# Patient Record
Sex: FEMALE | Race: WHITE | NOT HISPANIC OR LATINO | Employment: OTHER | URBAN - METROPOLITAN AREA
[De-identification: names, ages, dates, MRNs, and addresses within clinical notes are randomized per-mention and may not be internally consistent; named-entity substitution may affect disease eponyms.]

---

## 2023-05-03 ENCOUNTER — NURSING HOME VISIT (OUTPATIENT)
Dept: POST ACUTE CARE | Facility: EXTERNAL LOCATION | Age: 88
End: 2023-05-03
Payer: MEDICARE

## 2023-05-03 DIAGNOSIS — I50.9 CONGESTIVE HEART FAILURE, UNSPECIFIED HF CHRONICITY, UNSPECIFIED HEART FAILURE TYPE (MULTI): Primary | ICD-10-CM

## 2023-05-03 DIAGNOSIS — R53.1 WEAKNESS: ICD-10-CM

## 2023-05-03 PROCEDURE — 99342 HOME/RES VST NEW LOW MDM 30: CPT | Performed by: REGISTERED NURSE

## 2023-05-03 NOTE — Clinical Note
Patient: Ermelinda Benoit  : 3/14/1933    Encounter Date: 2023    HISTORY AND PHYSICAL    Subjective  Chief complaint: Ermelinda Benoit is a 89 y.o. female who is a assisted living/ home patient patient being seen and evaluated for multiple medical problems.  Patient presents for ***    HPI:  HPI    Past Medical History:   Diagnosis Date    Personal history of malignant neoplasm of breast 2018    History of malignant neoplasm of breast    Personal history of other medical treatment     History of cardiac monitoring    Personal history of other medical treatment     History of echocardiogram    Unspecified nondisplaced fracture of fifth cervical vertebra, initial encounter for closed fracture (CMS/[facility]) 11/15/2016    Closed nondisplaced fracture of fifth cervical vertebra, unspecified fracture morphology, initial encounter       Past Surgical History:   Procedure Laterality Date    BREAST LUMPECTOMY  2018    Breast Surgery Lumpectomy    CATARACT EXTRACTION  2018    Cataract Surgery    COLONOSCOPY  2018    Colonoscopy (Fiberoptic)    OTHER SURGICAL HISTORY  2018    Therapeutic Cystoscopy    OTHER SURGICAL HISTORY  2021    Transcatheter aortic valve replacement    TOTAL HIP ARTHROPLASTY  2018    Hip Replacement       No family history on file.    Social History     Socioeconomic History    Marital status:      Spouse name: Not on file    Number of children: Not on file    Years of education: Not on file    Highest education level: Not on file   Occupational History    Not on file   Tobacco Use    Smoking status: Not on file    Smokeless tobacco: Not on file   Vaping Use    Vaping status: Not on file   Substance and Sexual Activity    Alcohol use: Not on file    Drug use: Not on file    Sexual activity: Not on file   Other Topics Concern    Not on file   Social History Narrative    Not on file     Social Determinants of Health     Financial Resource Strain: Not  on file   Food Insecurity: Not on file   Transportation Needs: Not on file   Physical Activity: Not on file   Stress: Not on file   Social Connections: Not on file   Intimate Partner Violence: Not on file   Housing Stability: Not on file       Vital signs: ***    Objective  Physical Exam    Assessment/Plan  Problem List Items Addressed This Visit    None    Medications, treatments, and labs reviewed  Continue medications and treatments as listed in PCC    Scribe Attestation  Tayler MURILLO Scribe   attest that this documentation has been prepared under the direction and in the presence of BLAS Wang    Provider Attestation - Scribe documentation  All medical record entries made by the Scribe were at my direction and personally dictated by me. I have reviewed the chart and agree that the record accurately reflects my personal performance of the history, physical exam, discussion and plan.  BLAS Wang      Electronically Signed By: BLAS Wang   5/25/23  1:12 PM

## 2023-05-05 NOTE — PROGRESS NOTES
HISTORY AND PHYSICAL    Subjective   Chief complaint: Ermelinda Benoit is a 89 y.o. female who is a assisted living/ home patient patient being seen and evaluated for multiple medical problems.  Patient presents for new patient due to family request to change PCP    HPI:  HPI patient seen at bedside with daughter present denies shortness of breath fever chills    Past Medical History:   Diagnosis Date    Personal history of malignant neoplasm of breast 01/09/2018    History of malignant neoplasm of breast    Personal history of other medical treatment     History of cardiac monitoring    Personal history of other medical treatment     History of echocardiogram    Unspecified nondisplaced fracture of fifth cervical vertebra, initial encounter for closed fracture (CMS/[facility]) 11/15/2016    Closed nondisplaced fracture of fifth cervical vertebra, unspecified fracture morphology, initial encounter       Past Surgical History:   Procedure Laterality Date    BREAST LUMPECTOMY  03/09/2018    Breast Surgery Lumpectomy    CATARACT EXTRACTION  01/23/2018    Cataract Surgery    COLONOSCOPY  03/09/2018    Colonoscopy (Fiberoptic)    OTHER SURGICAL HISTORY  01/09/2018    Therapeutic Cystoscopy    OTHER SURGICAL HISTORY  03/30/2021    Transcatheter aortic valve replacement    TOTAL HIP ARTHROPLASTY  03/09/2018    Hip Replacement       No family history on file.    Social History     Socioeconomic History    Marital status:      Spouse name: Not on file    Number of children: Not on file    Years of education: Not on file    Highest education level: Not on file   Occupational History    Not on file   Tobacco Use    Smoking status: Not on file    Smokeless tobacco: Not on file   Vaping Use    Vaping status: Not on file   Substance and Sexual Activity    Alcohol use: Not on file    Drug use: Not on file    Sexual activity: Not on file   Other Topics Concern    Not on file   Social History Narrative    Not on file     Social  Determinants of Health     Financial Resource Strain: Not on file   Food Insecurity: Not on file   Transportation Needs: Not on file   Physical Activity: Not on file   Stress: Not on file   Social Connections: Not on file   Intimate Partner Violence: Not on file   Housing Stability: Not on file       Vital signs:     Objective   Physical Exam  Vitals reviewed.   Constitutional:       General: She is not in acute distress.  HENT:      Head: Normocephalic.      Mouth/Throat:      Mouth: Mucous membranes are moist.   Cardiovascular:      Rate and Rhythm: Normal rate.   Pulmonary:      Effort: Pulmonary effort is normal. No respiratory distress.   Abdominal:      General: Bowel sounds are normal.   Skin:     General: Skin is warm.   Neurological:      Mental Status: She is alert. Mental status is at baseline.         Assessment/Plan   Problem List Items Addressed This Visit          Circulatory    CHF (congestive heart failure) (CMS/[facility]) - Primary     Monitor weight            Other    Weakness     PT OT eval and treat          Medications, treatments, and labs reviewed  Continue medications and treatments as listed in PCC    Scribe Attestation  Tayler MURILLO Scribe   attest that this documentation has been prepared under the direction and in the presence of BLAS Wang    Provider Attestation - Scribe documentation  All medical record entries made by the Scribe were at my direction and personally dictated by me. I have reviewed the chart and agree that the record accurately reflects my personal performance of the history, physical exam, discussion and plan.  BLAS Wang

## 2023-05-31 ENCOUNTER — NURSING HOME VISIT (OUTPATIENT)
Dept: POST ACUTE CARE | Facility: EXTERNAL LOCATION | Age: 88
End: 2023-05-31
Payer: MEDICARE

## 2023-05-31 DIAGNOSIS — J44.9 CHRONIC OBSTRUCTIVE PULMONARY DISEASE, UNSPECIFIED COPD TYPE (MULTI): ICD-10-CM

## 2023-05-31 DIAGNOSIS — I50.9 CONGESTIVE HEART FAILURE, UNSPECIFIED HF CHRONICITY, UNSPECIFIED HEART FAILURE TYPE (MULTI): ICD-10-CM

## 2023-05-31 DIAGNOSIS — I48.20 CHRONIC ATRIAL FIBRILLATION (MULTI): Primary | ICD-10-CM

## 2023-05-31 DIAGNOSIS — I10 HYPERTENSION, ESSENTIAL: ICD-10-CM

## 2023-05-31 PROCEDURE — 99349 HOME/RES VST EST MOD MDM 40: CPT | Performed by: INTERNAL MEDICINE

## 2023-05-31 NOTE — LETTER
Patient: Ermelinda Benoit  : 3/14/1933    Encounter Date: 2023    PROGRESS NOTE    Subjective  Chief complaint: Ermelinda Benoit is a 89 y.o. female who is a assisted living/ home patient patient being seen and evaluated for monthly general medical care and follow-up.    HPI:  Patient seen for general medical care.  A-fib is rate controlled.. No palpitations.  COPD is stable. No wheezing.  HTN is controlled. BP at goal for age.      Objective  Vital signs:  138/56, 97.9, 66, 17, 95%  Physical Exam  Constitutional:       General: She is not in acute distress.  Eyes:      Extraocular Movements: Extraocular movements intact.   Cardiovascular:      Rate and Rhythm: Normal rate and regular rhythm.   Pulmonary:      Effort: Pulmonary effort is normal.      Breath sounds: Normal breath sounds.   Abdominal:      General: Bowel sounds are normal.      Palpations: Abdomen is soft.   Musculoskeletal:      Cervical back: Neck supple.      Right lower leg: No edema.      Left lower leg: No edema.   Neurological:      Mental Status: She is alert.   Psychiatric:         Mood and Affect: Mood normal.         Behavior: Behavior is cooperative.         Assessment/Plan  Problem List Items Addressed This Visit       CHF (congestive heart failure) (CMS/HCC)    COPD (chronic obstructive pulmonary disease) (CMS/HCC)    Hypertension, essential    Atrial fibrillation (CMS/HCC) - Primary     Medications, treatments, and labs reviewed  Continue medications and treatments as listed in EMR    Scribe Attestation  Tayler MURILLO Scribe   attest that this documentation has been prepared under the direction and in the presence of Mckay Arita DO    Provider Attestation - Scribe documentation  All medical record entries made by the Scribe were at my direction and personally dictated by me. I have reviewed the chart and agree that the record accurately reflects my personal performance of the history, physical exam, discussion and  plan.   Mckay Arita DO        Electronically Signed By: Mckay Arita DO   8/1/23 12:00 PM

## 2023-06-05 PROBLEM — I50.9 CHF (CONGESTIVE HEART FAILURE) (MULTI): Status: ACTIVE | Noted: 2023-06-05

## 2023-06-05 PROBLEM — R53.1 WEAKNESS: Status: ACTIVE | Noted: 2023-06-05

## 2023-06-05 PROBLEM — R60.0 LEG EDEMA: Status: ACTIVE | Noted: 2023-06-05

## 2023-06-27 NOTE — PROGRESS NOTES
PROGRESS NOTE    Subjective   Chief complaint: Ermelinda Benoit is a 89 y.o. female who is a assisted living/ home patient patient being seen and evaluated for monthly general medical care and follow-up.    HPI:  Patient seen for general medical care.  A-fib is rate controlled.. No palpitations.  COPD is stable. No wheezing.  HTN is controlled. BP at goal for age.      Objective   Vital signs:  138/56, 97.9, 66, 17, 95%  Physical Exam  Constitutional:       General: She is not in acute distress.  Eyes:      Extraocular Movements: Extraocular movements intact.   Cardiovascular:      Rate and Rhythm: Normal rate and regular rhythm.   Pulmonary:      Effort: Pulmonary effort is normal.      Breath sounds: Normal breath sounds.   Abdominal:      General: Bowel sounds are normal.      Palpations: Abdomen is soft.   Musculoskeletal:      Cervical back: Neck supple.      Right lower leg: No edema.      Left lower leg: No edema.   Neurological:      Mental Status: She is alert.   Psychiatric:         Mood and Affect: Mood normal.         Behavior: Behavior is cooperative.         Assessment/Plan   Problem List Items Addressed This Visit       CHF (congestive heart failure) (CMS/[facility])    COPD (chronic obstructive pulmonary disease) (CMS/[facility])    Hypertension, essential    Atrial fibrillation (CMS/[facility]) - Primary     Medications, treatments, and labs reviewed  Continue medications and treatments as listed in EMR    Scribe Attestation  ITayler Scribe   attest that this documentation has been prepared under the direction and in the presence of Mckay Arita DO    Provider Attestation - Scribe documentation  All medical record entries made by the Scribe were at my direction and personally dictated by me. I have reviewed the chart and agree that the record accurately reflects my personal performance of the history, physical exam, discussion and plan.   Mckay Arita DO

## 2023-07-14 ENCOUNTER — NURSING HOME VISIT (OUTPATIENT)
Dept: POST ACUTE CARE | Facility: EXTERNAL LOCATION | Age: 88
End: 2023-07-14
Payer: MEDICARE

## 2023-07-14 DIAGNOSIS — R53.1 WEAKNESS: ICD-10-CM

## 2023-07-14 DIAGNOSIS — J44.9 CHRONIC OBSTRUCTIVE PULMONARY DISEASE, UNSPECIFIED COPD TYPE (MULTI): ICD-10-CM

## 2023-07-14 DIAGNOSIS — S42.351D CLOSED DISPLACED COMMINUTED FRACTURE OF SHAFT OF RIGHT HUMERUS WITH ROUTINE HEALING: ICD-10-CM

## 2023-07-14 DIAGNOSIS — I48.20 CHRONIC ATRIAL FIBRILLATION (MULTI): Primary | ICD-10-CM

## 2023-07-14 DIAGNOSIS — I50.9 CONGESTIVE HEART FAILURE, UNSPECIFIED HF CHRONICITY, UNSPECIFIED HEART FAILURE TYPE (MULTI): ICD-10-CM

## 2023-07-14 DIAGNOSIS — I10 HYPERTENSION, ESSENTIAL: ICD-10-CM

## 2023-07-14 DIAGNOSIS — R60.0 LEG EDEMA: ICD-10-CM

## 2023-07-14 PROCEDURE — 99305 1ST NF CARE MODERATE MDM 35: CPT | Performed by: INTERNAL MEDICINE

## 2023-07-14 NOTE — LETTER
Patient: Ermelinda Benoit  : 3/14/1933    Encounter Date: 2023    HISTORY & PHYSICAL    Subjective  Chief complaint: Ermelinda Benoit is a 89 y.o. female who is a acute skilled care patient being seen and evaluated for multiple medical problems.  Patient presents for weakness.    HPI:  Patient was admitted to skilled nursing facility. Patient was admitted due to displaced comminuted fracture of shaft of humerus right arm. Patient also has a past medical history of COPD, cerebral infarction and weakness.         Past Medical History:   Diagnosis Date   • Personal history of malignant neoplasm of breast 2018    History of malignant neoplasm of breast   • Personal history of other medical treatment     History of cardiac monitoring   • Personal history of other medical treatment     History of echocardiogram   • Unspecified nondisplaced fracture of fifth cervical vertebra, initial encounter for closed fracture (CMS/[facility]) 11/15/2016    Closed nondisplaced fracture of fifth cervical vertebra, unspecified fracture morphology, initial encounter       Past Surgical History:   Procedure Laterality Date   • BREAST LUMPECTOMY  2018    Breast Surgery Lumpectomy   • CATARACT EXTRACTION  2018    Cataract Surgery   • COLONOSCOPY  2018    Colonoscopy (Fiberoptic)   • OTHER SURGICAL HISTORY  2018    Therapeutic Cystoscopy   • OTHER SURGICAL HISTORY  2021    Transcatheter aortic valve replacement   • TOTAL HIP ARTHROPLASTY  2018    Hip Replacement       No family history on file.    Social History     Socioeconomic History   • Marital status:      Spouse name: Not on file   • Number of children: Not on file   • Years of education: Not on file   • Highest education level: Not on file   Occupational History   • Not on file   Tobacco Use   • Smoking status: Not on file   • Smokeless tobacco: Not on file   Substance and Sexual Activity   • Alcohol use: Not on file   • Drug use:  Not on file   • Sexual activity: Not on file   Other Topics Concern   • Not on file   Social History Narrative   • Not on file     Social Determinants of Health     Financial Resource Strain: Not on file   Food Insecurity: Not on file   Transportation Needs: Not on file   Physical Activity: Not on file   Stress: Not on file   Social Connections: Not on file   Intimate Partner Violence: Not on file   Housing Stability: Not on file       Vital signs: 103/42, 98.5, 80, 19, 97%    Objective  Physical Exam  HENT:      Head: Normocephalic and atraumatic.      Nose: Nose normal.      Mouth/Throat:      Mouth: Mucous membranes are moist.      Pharynx: Oropharynx is clear.   Eyes:      Extraocular Movements: Extraocular movements intact.      Pupils: Pupils are equal, round, and reactive to light.   Cardiovascular:      Rate and Rhythm: Normal rate and regular rhythm.   Pulmonary:      Effort: No respiratory distress.      Breath sounds: Normal breath sounds. No wheezing, rhonchi or rales.   Abdominal:      General: Bowel sounds are normal. There is no distension.      Palpations: Abdomen is soft.      Tenderness: There is no abdominal tenderness. There is no guarding.   Musculoskeletal:      Right lower leg: No edema.      Left lower leg: No edema.   Skin:     General: Skin is warm and dry.   Neurological:      Mental Status: She is alert. Mental status is at baseline.         Assessment/Plan  Problem List Items Addressed This Visit       CHF (congestive heart failure) (CMS/[facility])    Leg edema    Weakness    Closed displaced comminuted fracture of shaft of right humerus with routine healing    COPD (chronic obstructive pulmonary disease) (CMS/HCC)    Hypertension, essential    Atrial fibrillation (CMS/[facility]) - Primary     Medications, treatments, and labs reviewed  Continue medications and treatments as listed in [facility]    Scribe Attestation  I, Nancy Us   attest that this documentation has been prepared under the  direction and in the presence of Mckay Arita DO.    Provider Attestation - Scribe documentation  All medical record entries made by the Scribe were at my direction and personally dictated by me. I have reviewed the chart and agree that the record accurately reflects my personal performance of the history, physical exam, discussion and plan.    Mckay Arita DO          Electronically Signed By: Mckay Arita DO   8/14/23  4:06 PM

## 2023-07-18 ENCOUNTER — NURSING HOME VISIT (OUTPATIENT)
Dept: POST ACUTE CARE | Facility: EXTERNAL LOCATION | Age: 88
End: 2023-07-18
Payer: MEDICARE

## 2023-07-18 DIAGNOSIS — I50.9 CONGESTIVE HEART FAILURE, UNSPECIFIED HF CHRONICITY, UNSPECIFIED HEART FAILURE TYPE (MULTI): ICD-10-CM

## 2023-07-18 DIAGNOSIS — S42.351D CLOSED DISPLACED COMMINUTED FRACTURE OF SHAFT OF RIGHT HUMERUS WITH ROUTINE HEALING: ICD-10-CM

## 2023-07-18 DIAGNOSIS — R53.1 WEAKNESS: Primary | ICD-10-CM

## 2023-07-18 PROCEDURE — 99308 SBSQ NF CARE LOW MDM 20: CPT | Performed by: REGISTERED NURSE

## 2023-07-18 NOTE — LETTER
Patient: Ermelinda Benoit  : 3/14/1933    Encounter Date: 2023    PROGRESS NOTE    Subjective  Chief complaint: Ermelinda Benoit is a 89 y.o. female who is a acute skilled care patient being seen and evaluated for weakness.    HPI:  23  Patient was admitted to skilled nursing facility. Patient was admitted due to displaced comminuted fracture of shaft of humerus right arm. Patient also has a past medical history of COPD, cerebral infarction and weakness.     23   Patient recently admitted to SNF for therapy d/t weakness after recent hospitalization for right humerus fracture.   Patient requires assist with ADLs and transfers.  Therapy is working with patient to increase strength and improve functional mobility.  Patient is ambulating 20 feet with LBQC and minimal/contact-guard assist.  Requires minimal assist for transfers and moderate/max assist for ADLs.  Patient is stable with no new concerns.  Pain from recent fracture is controlled with current medications.  No new concerns reported by staff.      Objective  Vital signs: 152/72  Physical Exam  Constitutional:       General: She is not in acute distress.  Eyes:      Extraocular Movements: Extraocular movements intact.   Pulmonary:      Effort: Pulmonary effort is normal.   Musculoskeletal:      Cervical back: Neck supple.   Neurological:      Mental Status: She is alert.   Psychiatric:         Mood and Affect: Mood normal.         Behavior: Behavior is cooperative.         Assessment/Plan  Problem List Items Addressed This Visit       CHF (congestive heart failure) (CMS/[facility])     Monitor weight low sodium diet          Weakness - Primary     PT OT eval and treat         Closed displaced comminuted fracture of shaft of right humerus with routine healing      RUE sling   no swelling in hand   Therapy   pain management          Medications, treatments, and labs reviewed  Continue medications and treatments as listed in EMR    Scribe  Attestation  I, Nancy Adame   attest that this documentation has been prepared under the direction and in the presence of BLAS Wang    Provider Attestation - Scribe documentation  All medical record entries made by the Scribe were at my direction and personally dictated by me. I have reviewed the chart and agree that the record accurately reflects my personal performance of the history, physical exam, discussion and plan.   BLAS Wang        Electronically Signed By: BLAS Wang   8/31/23  3:46 PM

## 2023-07-19 ENCOUNTER — NURSING HOME VISIT (OUTPATIENT)
Dept: POST ACUTE CARE | Facility: EXTERNAL LOCATION | Age: 88
End: 2023-07-19
Payer: MEDICARE

## 2023-07-19 DIAGNOSIS — I50.9 CONGESTIVE HEART FAILURE, UNSPECIFIED HF CHRONICITY, UNSPECIFIED HEART FAILURE TYPE (MULTI): ICD-10-CM

## 2023-07-19 DIAGNOSIS — R53.1 WEAKNESS: Primary | ICD-10-CM

## 2023-07-19 DIAGNOSIS — S42.351D CLOSED DISPLACED COMMINUTED FRACTURE OF SHAFT OF RIGHT HUMERUS WITH ROUTINE HEALING: ICD-10-CM

## 2023-07-19 DIAGNOSIS — J44.9 CHRONIC OBSTRUCTIVE PULMONARY DISEASE, UNSPECIFIED COPD TYPE (MULTI): ICD-10-CM

## 2023-07-19 PROCEDURE — 99308 SBSQ NF CARE LOW MDM 20: CPT | Performed by: REGISTERED NURSE

## 2023-07-19 NOTE — LETTER
Patient: Ermelinda Benoit  : 3/14/1933    Encounter Date: 2023    PROGRESS NOTE    Subjective  Chief complaint: Ermelinda Benoit is a 89 y.o. female who is an acute skilled patient being seen and evaluated for weakness    HPI:  23  Patient was admitted to skilled nursing facility. Patient was admitted due to displaced comminuted fracture of shaft of humerus right arm. Patient also has a past medical history of COPD, cerebral infarction and weakness.     23   Patient recently admitted to SNF for therapy d/t weakness after recent hospitalization for right humerus fracture.   Patient requires assist with ADLs and transfers.  Therapy is working with patient to increase strength and improve functional mobility.  Patient is ambulating 20 feet with LBQC and minimal/contact-guard assist.  Requires minimal assist for transfers and moderate/max assist for ADLs.  Patient is stable with no new concerns.  Pain from recent fracture is controlled with current medications.  No new concerns reported by staff.    23 Patient working with therapy to reach goals. Patient completing multiple therapeutic exercises to increase strength, mobility and flexibility.   Patient actively participates in skilled interventions. No new nursing concerns, denies n,v,f,c,pain.         Objective  Vital signs: 147/70,71,95% RA    Physical Exam  Constitutional:       General: She is not in acute distress.  Eyes:      Extraocular Movements: Extraocular movements intact.   Pulmonary:      Effort: Pulmonary effort is normal.   Musculoskeletal:      Cervical back: Neck supple.   Neurological:      Mental Status: She is alert.   Psychiatric:         Mood and Affect: Mood normal.         Behavior: Behavior is cooperative.         Assessment/Plan  Problem List Items Addressed This Visit       CHF (congestive heart failure) (CMS/[facility])     Monitor weight low sodium diet          Weakness - Primary     PT OT eval and treat         Closed  displaced comminuted fracture of shaft of right humerus with routine healing      RUE sling   no swelling in hand   Therapy   pain management         COPD (chronic obstructive pulmonary disease) (CMS/[facility])     Stable monitor           Medications, treatments, and labs reviewed  Continue medications and treatments as listed in EMR    Scribe Attestation  Aylin MURILLO Scribe   attest that this documentation has been prepared under the direction and in the presence of BLAS Wang.     Provider Attestation - Scribe documentation  All medical record entries made by the Scribe were at my direction and personally dictated by me. I have reviewed the chart and agree that the record accurately reflects my personal performance of the history, physical exam, discussion and plan.   BLAS Wang      Electronically Signed By: BLAS Wang   9/7/23  9:39 AM   Alonso Jackson(Attending)

## 2023-07-20 ENCOUNTER — NURSING HOME VISIT (OUTPATIENT)
Dept: POST ACUTE CARE | Facility: EXTERNAL LOCATION | Age: 88
End: 2023-07-20
Payer: MEDICARE

## 2023-07-20 ENCOUNTER — NURSING HOME VISIT (OUTPATIENT)
Dept: POST ACUTE CARE | Facility: EXTERNAL LOCATION | Age: 88
End: 2023-07-20

## 2023-07-20 DIAGNOSIS — R53.1 WEAKNESS: ICD-10-CM

## 2023-07-20 DIAGNOSIS — J44.9 CHRONIC OBSTRUCTIVE PULMONARY DISEASE, UNSPECIFIED COPD TYPE (MULTI): ICD-10-CM

## 2023-07-20 DIAGNOSIS — I10 HYPERTENSION, ESSENTIAL: ICD-10-CM

## 2023-07-20 DIAGNOSIS — I50.9 CONGESTIVE HEART FAILURE, UNSPECIFIED HF CHRONICITY, UNSPECIFIED HEART FAILURE TYPE (MULTI): ICD-10-CM

## 2023-07-20 DIAGNOSIS — I48.20 CHRONIC ATRIAL FIBRILLATION (MULTI): Primary | ICD-10-CM

## 2023-07-20 DIAGNOSIS — S42.351D CLOSED DISPLACED COMMINUTED FRACTURE OF SHAFT OF RIGHT HUMERUS WITH ROUTINE HEALING: ICD-10-CM

## 2023-07-20 PROCEDURE — 99308 SBSQ NF CARE LOW MDM 20: CPT | Performed by: INTERNAL MEDICINE

## 2023-07-20 PROCEDURE — 99309 SBSQ NF CARE MODERATE MDM 30: CPT

## 2023-07-20 NOTE — LETTER
Patient: Ermelinda Benoit  : 3/14/1933    Encounter Date: 2023    PROGRESS NOTE    Subjective  Chief complaint: Ermelinda Benoit is a 89 y.o. female who is an acute skilled patient being seen and evaluated for weakness    HPI:  23  patient  admitted to SNF for PT OT S/P fall.  Patient has comminuted fracture right humerus.  PMH COPD CVA right foot ulcer HTN A-fib HF AS HDL diverticulosis cholelithiasis.   Patient at baseline mentation, cooperative, pleasant Patient working with PT on gait training and transfers, and with ST for speech.  Appetite good. Sleeping well.  Denies F/C/N/V/D, cough, SOB, chest pain.  RUE in sling.      Objective  Vital signs: 147/70-98.2-71-18-95%    Physical Exam  Constitutional:       Appearance: Normal appearance.   HENT:      Head: Normocephalic.      Mouth/Throat:      Mouth: Mucous membranes are moist.   Eyes:      Extraocular Movements: Extraocular movements intact.   Cardiovascular:      Rate and Rhythm: Normal rate. Rhythm irregular.      Pulses: Normal pulses.      Heart sounds: Normal heart sounds.   Pulmonary:      Effort: Pulmonary effort is normal.      Breath sounds: Normal breath sounds.   Abdominal:      General: Bowel sounds are normal.      Palpations: Abdomen is soft.   Musculoskeletal:         General: Normal range of motion.      Cervical back: Normal range of motion and neck supple.      Comments:   Generalized weakness   R UE in sling   Skin:     General: Skin is warm and dry.      Capillary Refill: Capillary refill takes less than 2 seconds.   Neurological:      Mental Status: She is alert. Mental status is at baseline.   Psychiatric:         Mood and Affect: Mood normal.         Behavior: Behavior normal.         Assessment/Plan  Problem List Items Addressed This Visit       CHF (congestive heart failure) (CMS/HCC)      Stable   no SOB, cough, Rales, worsening edema   Torsemide   Weights   monitor         Weakness      Therapy   W/C for distance          Closed displaced comminuted fracture of shaft of right humerus with routine healing      right humerus stable   MSP intact   Sling   Ortho follow-up         COPD (chronic obstructive pulmonary disease) (CMS/HCC)      Stable   no SOB, cough, wheeze   monitor         Hypertension, essential      Stable   Monitor   losartan torsemide         Atrial fibrillation (CMS/HCC) - Primary      Stable   no SOB, palpitations, dizziness   heart rate controlled   Eliquis   bleeding precautions   monitor          Medications, treatments, and labs reviewed  Continue medications and treatments as listed in EMR    BLAS Bradley      Electronically Signed By: BLAS Bradley   7/22/23  9:16 AM

## 2023-07-20 NOTE — LETTER
Patient: Ermelinda Benoit  : 3/14/1933    Encounter Date: 2023    PROGRESS NOTE    Subjective  Chief complaint: Ermelinda Benoit is a 89 y.o. female who is an acute skilled patient being seen and evaluated for weakness    HPI:  23  Patient was admitted to skilled nursing facility. Patient was admitted due to displaced comminuted fracture of shaft of humerus right arm. Patient also has a past medical history of COPD, cerebral infarction and weakness.     23   Patient recently admitted to SNF for therapy d/t weakness after recent hospitalization for right humerus fracture.   Patient requires assist with ADLs and transfers.  Therapy is working with patient to increase strength and improve functional mobility.  Patient is ambulating 20 feet with LBQC and minimal/contact-guard assist.  Requires minimal assist for transfers and moderate/max assist for ADLs.  Patient is stable with no new concerns.  Pain from recent fracture is controlled with current medications.  No new concerns reported by staff.    23 Patient working with therapy to reach goals. Patient completing multiple therapeutic exercises to increase strength, mobility and flexibility.   Patient actively participates in skilled interventions. No new nursing concerns, denies n,v,f,c,pain.     23   Patient seen and examined at bedside.  Patient denies n/v/f/c.  Continues working in therapy.  No new complaints.          Objective  Vital signs: 141/71, 97.5, 78, 98%    Physical Exam  Constitutional:       General: She is not in acute distress.  Eyes:      Extraocular Movements: Extraocular movements intact.   Pulmonary:      Effort: Pulmonary effort is normal.   Musculoskeletal:      Cervical back: Neck supple.   Neurological:      Mental Status: She is alert.   Psychiatric:         Mood and Affect: Mood normal.         Behavior: Behavior is cooperative.         Assessment/Plan  Problem List Items Addressed This Visit       CHF  (congestive heart failure) (CMS/[facility])    Weakness    COPD (chronic obstructive pulmonary disease) (CMS/[facility])    Hypertension, essential    Atrial fibrillation (CMS/[facility]) - Primary     Medications, treatments, and labs reviewed  Continue medications and treatments as listed in PCC    Scribe Attestation  IKrissy Scribe   attest that this documentation has been prepared under the direction and in the presence of Mckay Arita DO.    Provider Attestation - Scribe documentation  All medical record entries made by the Scribe were at my direction and personally dictated by me. I have reviewed the chart and agree that the record accurately reflects my personal performance of the history, physical exam, discussion and plan.    Mckay Arita DO        Electronically Signed By: Mckay Arita DO   10/1/23  9:34 PM

## 2023-07-21 ENCOUNTER — NURSING HOME VISIT (OUTPATIENT)
Dept: POST ACUTE CARE | Facility: EXTERNAL LOCATION | Age: 88
End: 2023-07-21
Payer: MEDICARE

## 2023-07-21 DIAGNOSIS — I50.9 CONGESTIVE HEART FAILURE, UNSPECIFIED HF CHRONICITY, UNSPECIFIED HEART FAILURE TYPE (MULTI): ICD-10-CM

## 2023-07-21 DIAGNOSIS — R53.1 WEAKNESS: Primary | ICD-10-CM

## 2023-07-21 DIAGNOSIS — S42.351D CLOSED DISPLACED COMMINUTED FRACTURE OF SHAFT OF RIGHT HUMERUS WITH ROUTINE HEALING: ICD-10-CM

## 2023-07-21 PROCEDURE — 99308 SBSQ NF CARE LOW MDM 20: CPT | Performed by: REGISTERED NURSE

## 2023-07-21 NOTE — LETTER
Patient: Ermelinda Benoit  : 3/14/1933    Encounter Date: 2023    PROGRESS NOTE  Subjective  Chief complaint: Ermelinda Benoit is a 89 y.o. female who is a acute skilled care patient being seen and evaluated for weakness     HPI:  HPI patient denies pain no shortness of breath no fever chills participating therapy  Objective  Vital signs: 141/71  Physical Exam  Constitutional:       General: She is not in acute distress.  Eyes:      Extraocular Movements: Extraocular movements intact.   Pulmonary:      Effort: Pulmonary effort is normal.   Musculoskeletal:      Cervical back: Neck supple.   Neurological:      Mental Status: She is alert.   Psychiatric:         Mood and Affect: Mood normal.         Behavior: Behavior is cooperative.         Assessment/Plan  Problem List Items Addressed This Visit       CHF (congestive heart failure) (CMS/[facility])     Monitor weight low sodium diet          Weakness - Primary     PT OT eval and treat         Closed displaced comminuted fracture of shaft of right humerus with routine healing     Medications, treatments, and labs reviewed  Continue medications and treatments as listed in EMR    BLAS Wang      Electronically Signed By: BLAS Wang   23  2:20 PM

## 2023-07-22 PROBLEM — J44.9 COPD (CHRONIC OBSTRUCTIVE PULMONARY DISEASE) (MULTI): Status: ACTIVE | Noted: 2023-07-22

## 2023-07-22 PROBLEM — I10 HYPERTENSION, ESSENTIAL: Status: ACTIVE | Noted: 2023-07-22

## 2023-07-22 PROBLEM — S42.351D CLOSED DISPLACED COMMINUTED FRACTURE OF SHAFT OF RIGHT HUMERUS WITH ROUTINE HEALING: Status: ACTIVE | Noted: 2023-07-22

## 2023-07-22 PROBLEM — I48.91 ATRIAL FIBRILLATION (MULTI): Status: ACTIVE | Noted: 2023-07-22

## 2023-07-22 NOTE — PROGRESS NOTES
PROGRESS NOTE    Subjective   Chief complaint: Ermelinda Benoit is a 89 y.o. female who is an acute skilled patient being seen and evaluated for weakness    HPI:  7/20/23  patient  admitted to SNF for PT OT S/P fall.  Patient has comminuted fracture right humerus.  PMH COPD CVA right foot ulcer HTN A-fib HF AS HDL diverticulosis cholelithiasis.   Patient at baseline mentation, cooperative, pleasant Patient working with PT on gait training and transfers, and with ST for speech.  Appetite good. Sleeping well.  Denies F/C/N/V/D, cough, SOB, chest pain.  RUE in sling.      Objective   Vital signs: 147/70-98.2-71-18-95%    Physical Exam  Constitutional:       Appearance: Normal appearance.   HENT:      Head: Normocephalic.      Mouth/Throat:      Mouth: Mucous membranes are moist.   Eyes:      Extraocular Movements: Extraocular movements intact.   Cardiovascular:      Rate and Rhythm: Normal rate. Rhythm irregular.      Pulses: Normal pulses.      Heart sounds: Normal heart sounds.   Pulmonary:      Effort: Pulmonary effort is normal.      Breath sounds: Normal breath sounds.   Abdominal:      General: Bowel sounds are normal.      Palpations: Abdomen is soft.   Musculoskeletal:         General: Normal range of motion.      Cervical back: Normal range of motion and neck supple.      Comments:   Generalized weakness   R UE in sling   Skin:     General: Skin is warm and dry.      Capillary Refill: Capillary refill takes less than 2 seconds.   Neurological:      Mental Status: She is alert. Mental status is at baseline.   Psychiatric:         Mood and Affect: Mood normal.         Behavior: Behavior normal.         Assessment/Plan   Problem List Items Addressed This Visit       CHF (congestive heart failure) (CMS/HCC)      Stable   no SOB, cough, Rales, worsening edema   Torsemide   Weights   monitor         Weakness      Therapy   W/C for distance         Closed displaced comminuted fracture of shaft of right humerus with  routine healing      right humerus stable   MSP intact   Sling   Ortho follow-up         COPD (chronic obstructive pulmonary disease) (CMS/[facility])      Stable   no SOB, cough, wheeze   monitor         Hypertension, essential      Stable   Monitor   losartan torsemide         Atrial fibrillation (CMS/[facility]) - Primary      Stable   no SOB, palpitations, dizziness   heart rate controlled   Eliquis   bleeding precautions   monitor          Medications, treatments, and labs reviewed  Continue medications and treatments as listed in EMR    Ashlee Nicholas, APRN-CNP

## 2023-07-22 NOTE — ASSESSMENT & PLAN NOTE
Stable   no SOB, palpitations, dizziness   heart rate controlled   Eliquis   bleeding precautions   monitor

## 2023-07-25 ENCOUNTER — NURSING HOME VISIT (OUTPATIENT)
Dept: POST ACUTE CARE | Facility: EXTERNAL LOCATION | Age: 88
End: 2023-07-25
Payer: MEDICARE

## 2023-07-25 DIAGNOSIS — I50.9 CONGESTIVE HEART FAILURE, UNSPECIFIED HF CHRONICITY, UNSPECIFIED HEART FAILURE TYPE (MULTI): ICD-10-CM

## 2023-07-25 DIAGNOSIS — R53.1 WEAKNESS: Primary | ICD-10-CM

## 2023-07-25 DIAGNOSIS — S42.351D CLOSED DISPLACED COMMINUTED FRACTURE OF SHAFT OF RIGHT HUMERUS WITH ROUTINE HEALING: ICD-10-CM

## 2023-07-25 PROCEDURE — 99308 SBSQ NF CARE LOW MDM 20: CPT | Performed by: REGISTERED NURSE

## 2023-07-25 NOTE — LETTER
Patient: Ermelinda Benoit  : 3/14/1933    Encounter Date: 2023    PROGRESS NOTE    Subjective  Chief complaint: Ermelinda Benoit is a 89 y.o. female who is an acute skilled patient being seen and evaluated for weakness    HPI:  23  patient  admitted to SNF for PT OT S/P fall.  Patient has comminuted fracture right humerus.  PMH COPD CVA right foot ulcer HTN A-fib HF AS HDL diverticulosis cholelithiasis.   Patient at baseline mentation, cooperative, pleasant Patient working with PT on gait training and transfers, and with ST for speech.  Appetite good. Sleeping well.  Denies F/C/N/V/D, cough, SOB, chest pain.  RUE in sling.    23 Patient has been working in therapy to improve strength, endurance, and ADLs.  Patient continues to work toward goals.  No new concerns today.  Denies n/v/f/c pain.        Objective  Vital signs: 136/70,74,96% RA    Physical Exam  Constitutional:       General: She is not in acute distress.  Eyes:      Extraocular Movements: Extraocular movements intact.   Pulmonary:      Effort: Pulmonary effort is normal.   Musculoskeletal:      Cervical back: Neck supple.   Neurological:      Mental Status: She is alert.   Psychiatric:         Mood and Affect: Mood normal.         Behavior: Behavior is cooperative.         Assessment/Plan  Problem List Items Addressed This Visit       CHF (congestive heart failure) (CMS/[facility])     Monitor weight low sodium diet          Weakness - Primary     PT OT eval and treat         Closed displaced comminuted fracture of shaft of right humerus with routine healing      RUE sling   no swelling in hand   Therapy   pain management          Medications, treatments, and labs reviewed  Continue medications and treatments as listed in EMR    Scribe Attestation  I, Nancy Santamaria   attest that this documentation has been prepared under the direction and in the presence of MARK Wang-CNP.     Provider Attestation - Nancy  documentation  All medical record entries made by the Scribe were at my direction and personally dictated by me. I have reviewed the chart and agree that the record accurately reflects my personal performance of the history, physical exam, discussion and plan.   BLAS Wang      Electronically Signed By: BLAS Wang   8/29/23  1:01 PM

## 2023-07-26 ENCOUNTER — NURSING HOME VISIT (OUTPATIENT)
Dept: POST ACUTE CARE | Facility: EXTERNAL LOCATION | Age: 88
End: 2023-07-26
Payer: MEDICARE

## 2023-07-26 DIAGNOSIS — R53.1 WEAKNESS: Primary | ICD-10-CM

## 2023-07-26 DIAGNOSIS — J44.9 CHRONIC OBSTRUCTIVE PULMONARY DISEASE, UNSPECIFIED COPD TYPE (MULTI): ICD-10-CM

## 2023-07-26 DIAGNOSIS — S42.351D CLOSED DISPLACED COMMINUTED FRACTURE OF SHAFT OF RIGHT HUMERUS WITH ROUTINE HEALING: ICD-10-CM

## 2023-07-26 DIAGNOSIS — I50.9 CONGESTIVE HEART FAILURE, UNSPECIFIED HF CHRONICITY, UNSPECIFIED HEART FAILURE TYPE (MULTI): ICD-10-CM

## 2023-07-26 PROCEDURE — 99308 SBSQ NF CARE LOW MDM 20: CPT | Performed by: REGISTERED NURSE

## 2023-07-26 NOTE — LETTER
Patient: Ermelinda Benoit  : 3/14/1933    Encounter Date: 2023    PROGRESS NOTE    Subjective  Chief complaint: Ermelinda Benoit is a 89 y.o. female who is an acute skilled patient being seen and evaluated for weakness    HPI:  23  patient  admitted to SNF for PT OT S/P fall.  Patient has comminuted fracture right humerus.  PMH COPD CVA right foot ulcer HTN A-fib HF AS HDL diverticulosis cholelithiasis.   Patient at baseline mentation, cooperative, pleasant Patient working with PT on gait training and transfers, and with ST for speech.  Appetite good. Sleeping well.  Denies F/C/N/V/D, cough, SOB, chest pain.  RUE in sling.    23 Patient has been working in therapy to improve strength, endurance, and ADLs.  Patient continues to work toward goals.  No new concerns today.  Denies n/v/f/c pain.      23 Therapy has been working with the patient to improve strength and endurance with ADLs, transfers, and mobility.  Patient continues to work toward goals.  Patient is stable and has no new complaints.  Nursing staff voices no new concerns today.      Objective  Vital signs: 126/62,65,95% RA    Physical Exam  Constitutional:       General: She is not in acute distress.  Eyes:      Extraocular Movements: Extraocular movements intact.   Pulmonary:      Effort: Pulmonary effort is normal.   Musculoskeletal:      Cervical back: Neck supple.   Neurological:      Mental Status: She is alert.   Psychiatric:         Mood and Affect: Mood normal.         Behavior: Behavior is cooperative.         Assessment/Plan  Problem List Items Addressed This Visit       CHF (congestive heart failure) (CMS/[facility])     Monitor weight low sodium diet          Weakness - Primary     PT OT eval and treat         Closed displaced comminuted fracture of shaft of right humerus with routine healing      RUE sling   no swelling in hand   Therapy   pain management         COPD (chronic obstructive pulmonary disease) (CMS/[facility])      Stable monitor           Medications, treatments, and labs reviewed  Continue medications and treatments as listed in EMR    Scribe Attestation  I, Nancy Santamaria   attest that this documentation has been prepared under the direction and in the presence of BLAS Wang.     Provider Attestation - Scribe documentation  All medical record entries made by the Scribe were at my direction and personally dictated by me. I have reviewed the chart and agree that the record accurately reflects my personal performance of the history, physical exam, discussion and plan.   BLAS Wang      Electronically Signed By: BLAS Wang   9/7/23 12:40 PM

## 2023-08-01 ENCOUNTER — NURSING HOME VISIT (OUTPATIENT)
Dept: POST ACUTE CARE | Facility: EXTERNAL LOCATION | Age: 88
End: 2023-08-01
Payer: MEDICARE

## 2023-08-01 DIAGNOSIS — I48.20 CHRONIC ATRIAL FIBRILLATION (MULTI): Primary | ICD-10-CM

## 2023-08-01 DIAGNOSIS — S42.351D CLOSED DISPLACED COMMINUTED FRACTURE OF SHAFT OF RIGHT HUMERUS WITH ROUTINE HEALING: ICD-10-CM

## 2023-08-01 DIAGNOSIS — I50.9 CONGESTIVE HEART FAILURE, UNSPECIFIED HF CHRONICITY, UNSPECIFIED HEART FAILURE TYPE (MULTI): ICD-10-CM

## 2023-08-01 DIAGNOSIS — R53.1 WEAKNESS: ICD-10-CM

## 2023-08-01 PROCEDURE — 99309 SBSQ NF CARE MODERATE MDM 30: CPT

## 2023-08-01 NOTE — LETTER
Patient: Ermelinda Benoit  : 3/14/1933    Encounter Date: 2023    PROGRESS NOTE    Subjective  Chief complaint: Ermelinda Benoit is a 89 y.o. female who is an acute skilled patient being seen and evaluated for weakness    HPI:  Patient seen for general medical care.  A-fib is rate controlled.. No palpitations.  COPD is stable. No wheezing.  HTN is controlled. BP at goal for age.    23 Patient  admitted to SNF for PT OT S/P fall.  Patient has comminuted fracture right humerus.  PMH COPD CVA right foot ulcer HTN A-fib HF AS HDL diverticulosis cholelithiasis.   Patient at baseline mentation, cooperative, pleasant Patient working with PT on gait training and transfers, and with ST for speech.  Appetite good. Sleeping well.  Denies F/C/N/V/D, cough, SOB, chest pain.  RUE in sling.    23 Patient sitting up at bedside. States working in therapy and is now sore  in R shoulder. No injury or overuse. States sleeping well and appetite is good. RUE remains in sling. Offers no c/o f/c/n/v/d cough sob chest pain.        Objective  Vital signs: 127/62-98.2-69-17-96%    Physical Exam  Constitutional:       Appearance: Normal appearance.   HENT:      Head: Normocephalic.      Mouth/Throat:      Mouth: Mucous membranes are moist.   Eyes:      Extraocular Movements: Extraocular movements intact.   Cardiovascular:      Rate and Rhythm: Normal rate. Rhythm irregular.      Pulses: Normal pulses.      Heart sounds: Normal heart sounds.   Pulmonary:      Effort: Pulmonary effort is normal.      Breath sounds: Normal breath sounds.   Abdominal:      General: Bowel sounds are normal.      Palpations: Abdomen is soft.   Musculoskeletal:         General: Normal range of motion.      Cervical back: Normal range of motion and neck supple.      Comments:  generalized weakness   RUE [id number] intact   Skin:     General: Skin is warm and dry.      Capillary Refill: Capillary refill takes less than 2 seconds.    Neurological:      Mental Status: She is alert. Mental status is at baseline.   Psychiatric:         Mood and Affect: Mood normal.         Behavior: Behavior normal.         Assessment/Plan  Problem List Items Addressed This Visit       CHF (congestive heart failure) (CMS/[facility])      Stable   no SOB, rales, edema   weights   torsemide    monitor         Weakness      therapy         Closed displaced comminuted fracture of shaft of right humerus with routine healing      maintain sling   MSPs intact   Therapy   pain management   Ortho follow-up         Atrial fibrillation (CMS/[facility]) - Primary      Stable   no SOB, palpitations, dizziness   Eliquis   bleeding precautions   monitor          Medications, treatments, and labs reviewed  Continue medications and treatments as listed in EMR    BLAS Bradley      Electronically Signed By: BLAS Bradley   8/1/23  4:12 PM

## 2023-08-01 NOTE — PROGRESS NOTES
PROGRESS NOTE    Subjective   Chief complaint: Ermelinda Benoit is a 89 y.o. female who is an acute skilled patient being seen and evaluated for weakness    HPI:  Patient seen for general medical care.  A-fib is rate controlled.. No palpitations.  COPD is stable. No wheezing.  HTN is controlled. BP at goal for age.    7/20/23 Patient  admitted to SNF for PT OT S/P fall.  Patient has comminuted fracture right humerus.  PMH COPD CVA right foot ulcer HTN A-fib HF AS HDL diverticulosis cholelithiasis.   Patient at baseline mentation, cooperative, pleasant Patient working with PT on gait training and transfers, and with ST for speech.  Appetite good. Sleeping well.  Denies F/C/N/V/D, cough, SOB, chest pain.  RUE in sling.    8/1/23 Patient sitting up at bedside. States working in therapy and is now sore  in R shoulder. No injury or overuse. States sleeping well and appetite is good. RUE remains in sling. Offers no c/o f/c/n/v/d cough sob chest pain.        Objective   Vital signs: 127/62-98.2-69-17-96%    Physical Exam  Constitutional:       Appearance: Normal appearance.   HENT:      Head: Normocephalic.      Mouth/Throat:      Mouth: Mucous membranes are moist.   Eyes:      Extraocular Movements: Extraocular movements intact.   Cardiovascular:      Rate and Rhythm: Normal rate. Rhythm irregular.      Pulses: Normal pulses.      Heart sounds: Normal heart sounds.   Pulmonary:      Effort: Pulmonary effort is normal.      Breath sounds: Normal breath sounds.   Abdominal:      General: Bowel sounds are normal.      Palpations: Abdomen is soft.   Musculoskeletal:         General: Normal range of motion.      Cervical back: Normal range of motion and neck supple.      Comments:  generalized weakness   RUE onhzouhi-lpljl-ONV intact   Skin:     General: Skin is warm and dry.      Capillary Refill: Capillary refill takes less than 2 seconds.   Neurological:      Mental Status: She is alert. Mental status is at baseline.    Psychiatric:         Mood and Affect: Mood normal.         Behavior: Behavior normal.         Assessment/Plan   Problem List Items Addressed This Visit       CHF (congestive heart failure) (CMS/[facility])      Stable   no SOB, rales, edema   weights   torsemide    monitor         Weakness      therapy         Closed displaced comminuted fracture of shaft of right humerus with routine healing      maintain sling   MSPs intact   Therapy   pain management   Ortho follow-up         Atrial fibrillation (CMS/[facility]) - Primary      Stable   no SOB, palpitations, dizziness   Eliquis   bleeding precautions   monitor          Medications, treatments, and labs reviewed  Continue medications and treatments as listed in EMR    MARK Bradley-CNP

## 2023-08-02 ENCOUNTER — NURSING HOME VISIT (OUTPATIENT)
Dept: POST ACUTE CARE | Facility: EXTERNAL LOCATION | Age: 88
End: 2023-08-02
Payer: MEDICARE

## 2023-08-02 DIAGNOSIS — R53.1 WEAKNESS: ICD-10-CM

## 2023-08-02 DIAGNOSIS — I10 HYPERTENSION, ESSENTIAL: ICD-10-CM

## 2023-08-02 DIAGNOSIS — I48.20 CHRONIC ATRIAL FIBRILLATION (MULTI): ICD-10-CM

## 2023-08-02 DIAGNOSIS — S42.351D CLOSED DISPLACED COMMINUTED FRACTURE OF SHAFT OF RIGHT HUMERUS WITH ROUTINE HEALING: Primary | ICD-10-CM

## 2023-08-02 PROCEDURE — 99309 SBSQ NF CARE MODERATE MDM 30: CPT

## 2023-08-02 NOTE — LETTER
Patient: Ermelinda Benoit  : 3/14/1933    Encounter Date: 2023    PROGRESS NOTE    Subjective  Chief complaint: Ermelinda Benoit is a 89 y.o. female who is an acute skilled patient being seen and evaluated for weakness    HPI:  Patient seen for general medical care.  A-fib is rate controlled.. No palpitations.  COPD is stable. No wheezing.  HTN is controlled. BP at goal for age.    23 Patient  admitted to SNF for PT OT S/P fall.  Patient has comminuted fracture right humerus.  PMH COPD CVA right foot ulcer HTN A-fib HF AS HDL diverticulosis cholelithiasis.   Patient at baseline mentation, cooperative, pleasant Patient working with PT on gait training and transfers, and with ST for speech.  Appetite good. Sleeping well.  Denies F/C/N/V/D, cough, SOB, chest pain.  RUE in sling.    23 Patient sitting up at bedside. States working in therapy and is now sore  in R shoulder. No injury or overuse. States sleeping well and appetite is good. RUE remains in sling. Offers no c/o f/c/n/v/d cough sob chest pain.    23  Patient continues to work with therapy for transfers gait training and mobility.  States feeling better today.  Continues to wear his sling.  MSPs intact.  Patient offers no complaints at time.  No concerns per nursing.        Objective  Vital signs:  127/62-98.2-69-17-96%    Physical Exam  HENT:      Head: Normocephalic.      Mouth/Throat:      Mouth: Mucous membranes are moist.   Eyes:      Extraocular Movements: Extraocular movements intact.   Cardiovascular:      Rate and Rhythm: Normal rate. Rhythm irregular.      Pulses: Normal pulses.      Heart sounds: Normal heart sounds.   Pulmonary:      Effort: Pulmonary effort is normal.      Breath sounds: Normal breath sounds.   Abdominal:      General: Bowel sounds are normal.      Palpations: Abdomen is soft.   Musculoskeletal:         General: Normal range of motion.      Cervical back: Normal range of motion and neck supple.       Comments:   Generalized weakness   limited movement RUE   MSPs intact   Skin:     General: Skin is warm and dry.      Capillary Refill: Capillary refill takes less than 2 seconds.   Neurological:      Mental Status: She is alert. Mental status is at baseline.   Psychiatric:         Mood and Affect: Mood normal.         Behavior: Behavior normal.         Assessment/Plan  Problem List Items Addressed This Visit       Weakness      therapy         Closed displaced comminuted fracture of shaft of right humerus with routine healing - Primary      Stable   Sling   Therapy   Ortho follow-up   monitor         Hypertension, essential      Stable   BP at goal   losartan   monitor         Atrial fibrillation (CMS/HCC)      Stable   Eliquis   bleeding precautions   monitor          Medications, treatments, and labs reviewed  Continue medications and treatments as listed in EMR    BLAS Bradley      Electronically Signed By: BLAS Bradley   8/3/23  6:44 PM

## 2023-08-03 NOTE — PROGRESS NOTES
PROGRESS NOTE    Subjective   Chief complaint: Ermelinda Benoit is a 89 y.o. female who is an acute skilled patient being seen and evaluated for weakness    HPI:  Patient seen for general medical care.  A-fib is rate controlled.. No palpitations.  COPD is stable. No wheezing.  HTN is controlled. BP at goal for age.    7/20/23 Patient  admitted to SNF for PT OT S/P fall.  Patient has comminuted fracture right humerus.  PMH COPD CVA right foot ulcer HTN A-fib HF AS HDL diverticulosis cholelithiasis.   Patient at baseline mentation, cooperative, pleasant Patient working with PT on gait training and transfers, and with ST for speech.  Appetite good. Sleeping well.  Denies F/C/N/V/D, cough, SOB, chest pain.  RUE in sling.    8/1/23 Patient sitting up at bedside. States working in therapy and is now sore  in R shoulder. No injury or overuse. States sleeping well and appetite is good. RUE remains in sling. Offers no c/o f/c/n/v/d cough sob chest pain.    8/2/23  Patient continues to work with therapy for transfers gait training and mobility.  States feeling better today.  Continues to wear his sling.  MSPs intact.  Patient offers no complaints at time.  No concerns per nursing.        Objective   Vital signs:  127/62-98.2-69-17-96%    Physical Exam  HENT:      Head: Normocephalic.      Mouth/Throat:      Mouth: Mucous membranes are moist.   Eyes:      Extraocular Movements: Extraocular movements intact.   Cardiovascular:      Rate and Rhythm: Normal rate. Rhythm irregular.      Pulses: Normal pulses.      Heart sounds: Normal heart sounds.   Pulmonary:      Effort: Pulmonary effort is normal.      Breath sounds: Normal breath sounds.   Abdominal:      General: Bowel sounds are normal.      Palpations: Abdomen is soft.   Musculoskeletal:         General: Normal range of motion.      Cervical back: Normal range of motion and neck supple.      Comments:   Generalized weakness   limited movement RUE   MSPs intact   Skin:      General: Skin is warm and dry.      Capillary Refill: Capillary refill takes less than 2 seconds.   Neurological:      Mental Status: She is alert. Mental status is at baseline.   Psychiatric:         Mood and Affect: Mood normal.         Behavior: Behavior normal.         Assessment/Plan   Problem List Items Addressed This Visit       Weakness      therapy         Closed displaced comminuted fracture of shaft of right humerus with routine healing - Primary      Stable   Sling   Therapy   Ortho follow-up   monitor         Hypertension, essential      Stable   BP at goal   losartan   monitor         Atrial fibrillation (CMS/HCC)      Stable   Eliquis   bleeding precautions   monitor          Medications, treatments, and labs reviewed  Continue medications and treatments as listed in EMR    Ashlee Nicholas, APRN-CNP

## 2023-08-08 ENCOUNTER — NURSING HOME VISIT (OUTPATIENT)
Dept: POST ACUTE CARE | Facility: EXTERNAL LOCATION | Age: 88
End: 2023-08-08
Payer: MEDICARE

## 2023-08-08 DIAGNOSIS — I50.9 CONGESTIVE HEART FAILURE, UNSPECIFIED HF CHRONICITY, UNSPECIFIED HEART FAILURE TYPE (MULTI): ICD-10-CM

## 2023-08-08 DIAGNOSIS — I48.20 CHRONIC ATRIAL FIBRILLATION (MULTI): ICD-10-CM

## 2023-08-08 DIAGNOSIS — R53.1 WEAKNESS: Primary | ICD-10-CM

## 2023-08-08 DIAGNOSIS — S42.351D CLOSED DISPLACED COMMINUTED FRACTURE OF SHAFT OF RIGHT HUMERUS WITH ROUTINE HEALING: ICD-10-CM

## 2023-08-08 PROCEDURE — 99309 SBSQ NF CARE MODERATE MDM 30: CPT | Performed by: REGISTERED NURSE

## 2023-08-08 NOTE — LETTER
Patient: Ermelinda Benoit  : 3/14/1933    Encounter Date: 2023    PROGRESS NOTE    Subjective  Chief complaint: Ermelinda Benoit is a 89 y.o. female who is an acute skilled patient being seen and evaluated for weakness    HPI:  Patient seen for general medical care.  A-fib is rate controlled.. No palpitations.  COPD is stable. No wheezing.  HTN is controlled. BP at goal for age.    23 Patient  admitted to SNF for PT OT S/P fall.  Patient has comminuted fracture right humerus.  PMH COPD CVA right foot ulcer HTN A-fib HF AS HDL diverticulosis cholelithiasis.   Patient at baseline mentation, cooperative, pleasant Patient working with PT on gait training and transfers, and with ST for speech.  Appetite good. Sleeping well.  Denies F/C/N/V/D, cough, SOB, chest pain.  RUE in sling.    23 Patient sitting up at bedside. States working in therapy and is now sore  in R shoulder. No injury or overuse. States sleeping well and appetite is good. RUE remains in sling. Offers no c/o f/c/n/v/d cough sob chest pain.    23  Patient continues to work with therapy for transfers gait training and mobility.  States feeling better today.  Continues to wear his sling.  MSPs intact.  Patient offers no complaints at time.  No concerns per nursing.    23 Therapy has been working with the patient to improve strength and endurance with ADLs, transfers, and mobility.  Patient continues to work toward goals.  Patient is stable and has no new complaints.  Nursing staff voices no new concerns today.      Objective  Vital signs: 147/68,62,97% RA    Physical Exam  Constitutional:       General: She is not in acute distress.  Eyes:      Extraocular Movements: Extraocular movements intact.   Pulmonary:      Effort: Pulmonary effort is normal.   Musculoskeletal:      Cervical back: Neck supple.   Neurological:      Mental Status: She is alert.   Psychiatric:         Mood and Affect: Mood normal.         Behavior: Behavior is  cooperative.         Assessment/Plan  Problem List Items Addressed This Visit       CHF (congestive heart failure) (CMS/[facility])     Monitor weight low sodium diet          Weakness     PT OT eval and treat         Closed displaced comminuted fracture of shaft of right humerus with routine healing      RUE sling   no swelling in hand   Therapy   pain management         Atrial fibrillation (CMS/[facility]) - Primary     Stable   no SOB palpitations dizziness   Eliquis   bleeding precautions   monitor          Medications, treatments, and labs reviewed  Continue medications and treatments as listed in EMR    Scribe Attestation  IAylin Scribe   attest that this documentation has been prepared under the direction and in the presence of BLAS Wang.     Provider Attestation - Scribe documentation  All medical record entries made by the Scribe were at my direction and personally dictated by me. I have reviewed the chart and agree that the record accurately reflects my personal performance of the history, physical exam, discussion and plan.   BLAS Wang      Electronically Signed By: BLAS Wang   9/14/23 10:13 AM

## 2023-08-09 ENCOUNTER — NURSING HOME VISIT (OUTPATIENT)
Dept: POST ACUTE CARE | Facility: EXTERNAL LOCATION | Age: 88
End: 2023-08-09
Payer: MEDICARE

## 2023-08-09 DIAGNOSIS — I50.9 CONGESTIVE HEART FAILURE, UNSPECIFIED HF CHRONICITY, UNSPECIFIED HEART FAILURE TYPE (MULTI): ICD-10-CM

## 2023-08-09 DIAGNOSIS — R53.1 WEAKNESS: Primary | ICD-10-CM

## 2023-08-09 DIAGNOSIS — I10 HYPERTENSION, ESSENTIAL: ICD-10-CM

## 2023-08-09 DIAGNOSIS — S42.351D CLOSED DISPLACED COMMINUTED FRACTURE OF SHAFT OF RIGHT HUMERUS WITH ROUTINE HEALING: ICD-10-CM

## 2023-08-09 PROCEDURE — 99308 SBSQ NF CARE LOW MDM 20: CPT | Performed by: REGISTERED NURSE

## 2023-08-09 NOTE — LETTER
Patient: Ermelinda Benoit  : 3/14/1933    Encounter Date: 2023    PROGRESS NOTE    Subjective  Chief complaint: Ermelinda Benoit is a 89 y.o. female who is an acute skilled patient being seen and evaluated for weakness    HPI:  Patient seen for general medical care.  A-fib is rate controlled.. No palpitations.  COPD is stable. No wheezing.  HTN is controlled. BP at goal for age.    23 Patient  admitted to SNF for PT OT S/P fall.  Patient has comminuted fracture right humerus.  PMH COPD CVA right foot ulcer HTN A-fib HF AS HDL diverticulosis cholelithiasis.   Patient at baseline mentation, cooperative, pleasant Patient working with PT on gait training and transfers, and with ST for speech.  Appetite good. Sleeping well.  Denies F/C/N/V/D, cough, SOB, chest pain.  RUE in sling.    23 Patient sitting up at bedside. States working in therapy and is now sore  in R shoulder. No injury or overuse. States sleeping well and appetite is good. RUE remains in sling. Offers no c/o f/c/n/v/d cough sob chest pain.    23  Patient continues to work with therapy for transfers gait training and mobility.  States feeling better today.  Continues to wear his sling.  MSPs intact.  Patient offers no complaints at time.  No concerns per nursing.    23 Patient continues working with therapy to reach goals. Patient ambulating 100' with CGA. Patient is SBA for transfers and ADLs. No new nursing concerns, denies n,v,f,c,pain.         Objective  Vital signs: 138/60,60.96% RA    Physical Exam  Constitutional:       General: She is not in acute distress.  Eyes:      Extraocular Movements: Extraocular movements intact.   Pulmonary:      Effort: Pulmonary effort is normal.   Musculoskeletal:      Cervical back: Neck supple.   Neurological:      Mental Status: She is alert.   Psychiatric:         Mood and Affect: Mood normal.         Behavior: Behavior is cooperative.         Assessment/Plan  Problem List Items Addressed  This Visit       CHF (congestive heart failure) (CMS/[facility])     Monitor weight low sodium diet          Weakness - Primary     PT OT eval and treat         Closed displaced comminuted fracture of shaft of right humerus with routine healing      RUE sling   no swelling in hand   Therapy   pain management         Hypertension, essential     staple   losartan   monitor          Medications, treatments, and labs reviewed  Continue medications and treatments as listed in EMR    Scribe Attestation  IAylin Scribe   attest that this documentation has been prepared under the direction and in the presence of BLAS Wang.     Provider Attestation - Scribe documentation  All medical record entries made by the Scribe were at my direction and personally dictated by me. I have reviewed the chart and agree that the record accurately reflects my personal performance of the history, physical exam, discussion and plan.   BLAS Wang      Electronically Signed By: BLAS Wang   9/14/23  3:06 PM

## 2023-08-10 ENCOUNTER — NURSING HOME VISIT (OUTPATIENT)
Dept: POST ACUTE CARE | Facility: EXTERNAL LOCATION | Age: 88
End: 2023-08-10
Payer: MEDICARE

## 2023-08-10 DIAGNOSIS — I48.20 CHRONIC ATRIAL FIBRILLATION (MULTI): Primary | ICD-10-CM

## 2023-08-10 DIAGNOSIS — I50.9 CONGESTIVE HEART FAILURE, UNSPECIFIED HF CHRONICITY, UNSPECIFIED HEART FAILURE TYPE (MULTI): ICD-10-CM

## 2023-08-10 DIAGNOSIS — S42.351D CLOSED DISPLACED COMMINUTED FRACTURE OF SHAFT OF RIGHT HUMERUS WITH ROUTINE HEALING: ICD-10-CM

## 2023-08-10 PROCEDURE — 99309 SBSQ NF CARE MODERATE MDM 30: CPT

## 2023-08-10 NOTE — LETTER
Patient: Ermelinda Benoit  : 3/14/1933    Encounter Date: 08/10/2023    PROGRESS NOTE    Subjective  Chief complaint: Ermelinda Benoit is a 89 y.o. female who is an acute skilled patient being seen and evaluated for weakness    HPI:  Patient seen for general medical care.  A-fib is rate controlled.. No palpitations.  COPD is stable. No wheezing.  HTN is controlled. BP at goal for age.    23 Patient  admitted to SNF for PT OT S/P fall.  Patient has comminuted fracture right humerus.  PMH COPD CVA right foot ulcer HTN A-fib HF AS HDL diverticulosis cholelithiasis.   Patient at baseline mentation, cooperative, pleasant Patient working with PT on gait training and transfers, and with ST for speech.  Appetite good. Sleeping well.  Denies F/C/N/V/D, cough, SOB, chest pain.  RUE in sling.    23 Patient sitting up at bedside. States working in therapy and is now sore  in R shoulder. No injury or overuse. States sleeping well and appetite is good. RUE remains in sling. Offers no c/o f/c/n/v/d cough sob chest pain.    23  Patient continues to work with therapy for transfers gait training and mobility.  States feeling better today.  Continues to wear his sling.  MSPs intact.  Patient offers no complaints at time.  No concerns per nursing.    8/10/23Patient states doing well today.  Sitting up at bedside wearing sling RUE.  MSPs intact.  Continues to work with therapy for gait training and transfers.  Patient offers no complaints at time.  Pain managed.  HTN and COPD stable.  No concerns per nursing        Objective  Vital signs: 135/66-97.3-77-18-96%    Physical Exam  Constitutional:       Appearance: Normal appearance.   HENT:      Mouth/Throat:      Mouth: Mucous membranes are moist.   Eyes:      Extraocular Movements: Extraocular movements intact.   Cardiovascular:      Rate and Rhythm: Normal rate. Rhythm irregular.      Pulses: Normal pulses.      Heart sounds: Normal heart sounds.   Pulmonary:       Effort: Pulmonary effort is normal.      Breath sounds: Normal breath sounds.   Abdominal:      General: Bowel sounds are normal.      Palpations: Abdomen is soft.   Musculoskeletal:         General: Normal range of motion.      Cervical back: Normal range of motion and neck supple.      Comments:  Limited ROM RUE   Sling   MSPs intact   Skin:     General: Skin is warm and dry.      Capillary Refill: Capillary refill takes less than 2 seconds.   Neurological:      Mental Status: She is alert. Mental status is at baseline.   Psychiatric:         Mood and Affect: Mood normal.         Behavior: Behavior normal.         Assessment/Plan  Problem List Items Addressed This Visit       CHF (congestive heart failure) (CMS/[facility])      Stable   no SOB, Rales, edema   Torsemide   monitor         Closed displaced comminuted fracture of shaft of right humerus with routine healing      Stable   Sling   MSPs intact   Therapy   monitor         Atrial fibrillation (CMS/[facility]) - Primary      Stable   no SOB, palpitations   Eliquis   bleeding precautions   monitor          Medications, treatments, and labs reviewed  Continue medications and treatments as listed in EMR    BLAS Bradley      Electronically Signed By: BLAS Brdaley   8/12/23 10:24 PM

## 2023-08-11 ENCOUNTER — NURSING HOME VISIT (OUTPATIENT)
Dept: POST ACUTE CARE | Facility: EXTERNAL LOCATION | Age: 88
End: 2023-08-11
Payer: MEDICARE

## 2023-08-11 DIAGNOSIS — I50.9 CONGESTIVE HEART FAILURE, UNSPECIFIED HF CHRONICITY, UNSPECIFIED HEART FAILURE TYPE (MULTI): ICD-10-CM

## 2023-08-11 DIAGNOSIS — J44.9 CHRONIC OBSTRUCTIVE PULMONARY DISEASE, UNSPECIFIED COPD TYPE (MULTI): ICD-10-CM

## 2023-08-11 DIAGNOSIS — S42.351D CLOSED DISPLACED COMMINUTED FRACTURE OF SHAFT OF RIGHT HUMERUS WITH ROUTINE HEALING: ICD-10-CM

## 2023-08-11 DIAGNOSIS — R53.1 WEAKNESS: Primary | ICD-10-CM

## 2023-08-11 PROCEDURE — 99309 SBSQ NF CARE MODERATE MDM 30: CPT | Performed by: REGISTERED NURSE

## 2023-08-11 NOTE — LETTER
Patient: Ermelinda Benoit  : 3/14/1933    Encounter Date: 2023    PROGRESS NOTE    Subjective  Chief complaint: Ermelinda Benoit is a 89 y.o. female who is an acute skilled patient being seen and evaluated for weakness    HPI:  Patient seen for general medical care.  A-fib is rate controlled.. No palpitations.  COPD is stable. No wheezing.  HTN is controlled. BP at goal for age.    23 Patient  admitted to SNF for PT OT S/P fall.  Patient has comminuted fracture right humerus.  PMH COPD CVA right foot ulcer HTN A-fib HF AS HDL diverticulosis cholelithiasis.   Patient at baseline mentation, cooperative, pleasant Patient working with PT on gait training and transfers, and with ST for speech.  Appetite good. Sleeping well.  Denies F/C/N/V/D, cough, SOB, chest pain.  RUE in sling.    23 Patient sitting up at bedside. States working in therapy and is now sore  in R shoulder. No injury or overuse. States sleeping well and appetite is good. RUE remains in sling. Offers no c/o f/c/n/v/d cough sob chest pain.    23  Patient continues to work with therapy for transfers gait training and mobility.  States feeling better today.  Continues to wear his sling.  MSPs intact.  Patient offers no complaints at time.  No concerns per nursing.    8/10/23Patient states doing well today.  Sitting up at bedside wearing sling RUE.  MSPs intact.  Continues to work with therapy for gait training and transfers.  Patient offers no complaints at time.  Pain managed.  HTN and COPD stable.  No concerns per nursing    23 Therapy has been working with the patient to improve strength and endurance with ADLs, transfers, and mobility.  Patient continues to work toward goals.  Patient is stable and has no new complaints.  Nursing staff voices no new concerns today.      Objective  Vital signs: 162/64, 97.8, 69, 96%    Physical Exam  Constitutional:       General: She is not in acute distress.  Eyes:      Extraocular  Movements: Extraocular movements intact.   Pulmonary:      Effort: Pulmonary effort is normal.   Musculoskeletal:      Cervical back: Neck supple.   Neurological:      Mental Status: She is alert.   Psychiatric:         Mood and Affect: Mood normal.         Behavior: Behavior is cooperative.         Assessment/Plan  Problem List Items Addressed This Visit       CHF (congestive heart failure) (CMS/[facility])     Monitor weight low sodium diet          Weakness - Primary     PT OT eval and treat         Closed displaced comminuted fracture of shaft of right humerus with routine healing      RUE sling   no swelling in hand   Therapy   pain management         COPD (chronic obstructive pulmonary disease) (CMS/[facility])     Stable monitor           Medications, treatments, and labs reviewed  Continue medications and treatments as listed in [facility]    Scribe Attestation  IKrissy Scribe   attest that this documentation has been prepared under the direction and in the presence of BLAS Wang.    Provider Attestation - Scribe documentation  All medical record entries made by the Scribe were at my direction and personally dictated by me. I have reviewed the chart and agree that the record accurately reflects my personal performance of the history, physical exam, discussion and plan.    BLAS Wang        Electronically Signed By: BLAS Wang   9/28/23  4:04 PM

## 2023-08-14 NOTE — PROGRESS NOTES
HISTORY & PHYSICAL    Subjective   Chief complaint: Ermelinda Benoit is a 89 y.o. female who is a acute skilled care patient being seen and evaluated for multiple medical problems.  Patient presents for weakness.    HPI:  Patient was admitted to skilled nursing facility. Patient was admitted due to displaced comminuted fracture of shaft of humerus right arm. Patient also has a past medical history of COPD, cerebral infarction and weakness.         Past Medical History:   Diagnosis Date    Personal history of malignant neoplasm of breast 01/09/2018    History of malignant neoplasm of breast    Personal history of other medical treatment     History of cardiac monitoring    Personal history of other medical treatment     History of echocardiogram    Unspecified nondisplaced fracture of fifth cervical vertebra, initial encounter for closed fracture (CMS/[facility]) 11/15/2016    Closed nondisplaced fracture of fifth cervical vertebra, unspecified fracture morphology, initial encounter       Past Surgical History:   Procedure Laterality Date    BREAST LUMPECTOMY  03/09/2018    Breast Surgery Lumpectomy    CATARACT EXTRACTION  01/23/2018    Cataract Surgery    COLONOSCOPY  03/09/2018    Colonoscopy (Fiberoptic)    OTHER SURGICAL HISTORY  01/09/2018    Therapeutic Cystoscopy    OTHER SURGICAL HISTORY  03/30/2021    Transcatheter aortic valve replacement    TOTAL HIP ARTHROPLASTY  03/09/2018    Hip Replacement       No family history on file.    Social History     Socioeconomic History    Marital status:      Spouse name: Not on file    Number of children: Not on file    Years of education: Not on file    Highest education level: Not on file   Occupational History    Not on file   Tobacco Use    Smoking status: Not on file    Smokeless tobacco: Not on file   Substance and Sexual Activity    Alcohol use: Not on file    Drug use: Not on file    Sexual activity: Not on file   Other Topics Concern    Not on file   Social  History Narrative    Not on file     Social Determinants of Health     Financial Resource Strain: Not on file   Food Insecurity: Not on file   Transportation Needs: Not on file   Physical Activity: Not on file   Stress: Not on file   Social Connections: Not on file   Intimate Partner Violence: Not on file   Housing Stability: Not on file       Vital signs: 103/42, 98.5, 80, 19, 97%    Objective   Physical Exam  HENT:      Head: Normocephalic and atraumatic.      Nose: Nose normal.      Mouth/Throat:      Mouth: Mucous membranes are moist.      Pharynx: Oropharynx is clear.   Eyes:      Extraocular Movements: Extraocular movements intact.      Pupils: Pupils are equal, round, and reactive to light.   Cardiovascular:      Rate and Rhythm: Normal rate and regular rhythm.   Pulmonary:      Effort: No respiratory distress.      Breath sounds: Normal breath sounds. No wheezing, rhonchi or rales.   Abdominal:      General: Bowel sounds are normal. There is no distension.      Palpations: Abdomen is soft.      Tenderness: There is no abdominal tenderness. There is no guarding.   Musculoskeletal:      Right lower leg: No edema.      Left lower leg: No edema.   Skin:     General: Skin is warm and dry.   Neurological:      Mental Status: She is alert. Mental status is at baseline.         Assessment/Plan   Problem List Items Addressed This Visit       CHF (congestive heart failure) (CMS/[facility])    Leg edema    Weakness    Closed displaced comminuted fracture of shaft of right humerus with routine healing    COPD (chronic obstructive pulmonary disease) (CMS/[facility])    Hypertension, essential    Atrial fibrillation (CMS/[facility]) - Primary     Medications, treatments, and labs reviewed  Continue medications and treatments as listed in [facility]    Scribe Attestation  I, Nancy Us   attest that this documentation has been prepared under the direction and in the presence of Mckay Arita DO.    Provider Attestation - Leenaibe  documentation  All medical record entries made by the Scribe were at my direction and personally dictated by me. I have reviewed the chart and agree that the record accurately reflects my personal performance of the history, physical exam, discussion and plan.    Mckay Arita, DO

## 2023-08-15 ENCOUNTER — NURSING HOME VISIT (OUTPATIENT)
Dept: POST ACUTE CARE | Facility: EXTERNAL LOCATION | Age: 88
End: 2023-08-15
Payer: MEDICARE

## 2023-08-15 DIAGNOSIS — R53.1 WEAKNESS: Primary | ICD-10-CM

## 2023-08-15 DIAGNOSIS — J44.9 CHRONIC OBSTRUCTIVE PULMONARY DISEASE, UNSPECIFIED COPD TYPE (MULTI): ICD-10-CM

## 2023-08-15 DIAGNOSIS — I50.9 CONGESTIVE HEART FAILURE, UNSPECIFIED HF CHRONICITY, UNSPECIFIED HEART FAILURE TYPE (MULTI): ICD-10-CM

## 2023-08-15 DIAGNOSIS — I10 HYPERTENSION, ESSENTIAL: ICD-10-CM

## 2023-08-15 PROCEDURE — 99308 SBSQ NF CARE LOW MDM 20: CPT | Performed by: REGISTERED NURSE

## 2023-08-15 NOTE — LETTER
Patient: Ermelinda Benoit  : 3/14/1933    Encounter Date: 08/15/2023    PROGRESS NOTE    Subjective  Chief complaint: Ermelinda Benoit is a 89 y.o. female who is an acute skilled patient being seen and evaluated for weakness    HPI:  Patient seen for general medical care.  A-fib is rate controlled.. No palpitations.  COPD is stable. No wheezing.  HTN is controlled. BP at goal for age.    23 Patient  admitted to SNF for PT OT S/P fall.  Patient has comminuted fracture right humerus.  PMH COPD CVA right foot ulcer HTN A-fib HF AS HDL diverticulosis cholelithiasis.   Patient at baseline mentation, cooperative, pleasant Patient working with PT on gait training and transfers, and with ST for speech.  Appetite good. Sleeping well.  Denies F/C/N/V/D, cough, SOB, chest pain.  RUE in sling.    23 Patient sitting up at bedside. States working in therapy and is now sore  in R shoulder. No injury or overuse. States sleeping well and appetite is good. RUE remains in sling. Offers no c/o f/c/n/v/d cough sob chest pain.    23  Patient continues to work with therapy for transfers gait training and mobility.  States feeling better today.  Continues to wear his sling.  MSPs intact.  Patient offers no complaints at time.  No concerns per nursing.    23 Patient continues working with therapy to reach goals. Patient ambulating 100' with CGA. Patient is SBA for transfers and ADLs. No new nursing concerns, denies n,v,f,c,pain.     8/15/23 Therapy has been working with the patient to improve strength and endurance with ADLs, transfers, and mobility.  Patient continues to work toward goals.  Patient is stable and has no new complaints.  Nursing staff voices no new concerns today.      Objective  Vital signs: 110/55, 97.9, 84, 98%    Physical Exam  Constitutional:       General: She is not in acute distress.  Eyes:      Extraocular Movements: Extraocular movements intact.   Pulmonary:      Effort: Pulmonary effort is  normal.   Musculoskeletal:      Cervical back: Neck supple.   Neurological:      Mental Status: She is alert.   Psychiatric:         Mood and Affect: Mood normal.         Behavior: Behavior is cooperative.         Assessment/Plan  Problem List Items Addressed This Visit       CHF (congestive heart failure) (CMS/[facility])     Monitor weight low sodium diet          Weakness - Primary     PT OT eval and treat         COPD (chronic obstructive pulmonary disease) (CMS/[facility])     Stable monitor          Hypertension, essential     Medications, treatments, and labs reviewed  Continue medications and treatments as listed in [facility]    Scribe Attestation  IKrissy Scribe   attest that this documentation has been prepared under the direction and in the presence of BLAS Wang.    Provider Attestation - Scribe documentation  All medical record entries made by the Scribe were at my direction and personally dictated by me. I have reviewed the chart and agree that the record accurately reflects my personal performance of the history, physical exam, discussion and plan.    BLAS Wang        Electronically Signed By: BLAS Wang   9/14/23  3:33 PM

## 2023-08-16 ENCOUNTER — NURSING HOME VISIT (OUTPATIENT)
Dept: POST ACUTE CARE | Facility: EXTERNAL LOCATION | Age: 88
End: 2023-08-16
Payer: MEDICARE

## 2023-08-16 DIAGNOSIS — J44.9 CHRONIC OBSTRUCTIVE PULMONARY DISEASE, UNSPECIFIED COPD TYPE (MULTI): ICD-10-CM

## 2023-08-16 DIAGNOSIS — S42.351D CLOSED DISPLACED COMMINUTED FRACTURE OF SHAFT OF RIGHT HUMERUS WITH ROUTINE HEALING: ICD-10-CM

## 2023-08-16 DIAGNOSIS — R53.1 WEAKNESS: Primary | ICD-10-CM

## 2023-08-16 DIAGNOSIS — I50.9 CONGESTIVE HEART FAILURE, UNSPECIFIED HF CHRONICITY, UNSPECIFIED HEART FAILURE TYPE (MULTI): ICD-10-CM

## 2023-08-16 PROCEDURE — 99309 SBSQ NF CARE MODERATE MDM 30: CPT | Performed by: REGISTERED NURSE

## 2023-08-16 NOTE — LETTER
Patient: Ermelinda Benoit  : 3/14/1933    Encounter Date: 2023    PROGRESS NOTE    Subjective  Chief complaint: Ermelinda Benoit is a 89 y.o. female who is an acute skilled patient being seen and evaluated for weakness    HPI:  Patient seen for general medical care.  A-fib is rate controlled.. No palpitations.  COPD is stable. No wheezing.  HTN is controlled. BP at goal for age.    23 Patient  admitted to SNF for PT OT S/P fall.  Patient has comminuted fracture right humerus.  PMH COPD CVA right foot ulcer HTN A-fib HF AS HDL diverticulosis cholelithiasis.   Patient at baseline mentation, cooperative, pleasant Patient working with PT on gait training and transfers, and with ST for speech.  Appetite good. Sleeping well.  Denies F/C/N/V/D, cough, SOB, chest pain.  RUE in sling.    23 Patient sitting up at bedside. States working in therapy and is now sore  in R shoulder. No injury or overuse. States sleeping well and appetite is good. RUE remains in sling. Offers no c/o f/c/n/v/d cough sob chest pain.    23  Patient continues to work with therapy for transfers gait training and mobility.  States feeling better today.  Continues to wear his sling.  MSPs intact.  Patient offers no complaints at time.  No concerns per nursing.    23 Patient continues working with therapy to reach goals. Patient ambulating 100' with CGA. Patient is SBA for transfers and ADLs. No new nursing concerns, denies n,v,f,c,pain.     8/15/23 Therapy has been working with the patient to improve strength and endurance with ADLs, transfers, and mobility.  Patient continues to work toward goals.  Patient is stable and has no new complaints.  Nursing staff voices no new concerns today.    23 Patient seen and examined at bedside.  Patient denies n/v/f/c.  Continues working in therapy.  No new complaints.      Objective  Vital signs: 140/70, 97.2, 64, 96%    Physical Exam  Constitutional:       General: She is not in  acute distress.  Eyes:      Extraocular Movements: Extraocular movements intact.   Pulmonary:      Effort: Pulmonary effort is normal.   Musculoskeletal:      Cervical back: Neck supple.   Neurological:      Mental Status: She is alert.   Psychiatric:         Mood and Affect: Mood normal.         Behavior: Behavior is cooperative.         Assessment/Plan  Problem List Items Addressed This Visit       CHF (congestive heart failure) (CMS/[facility])     Monitor weight low sodium diet          Weakness - Primary     PT OT eval and treat         Closed displaced comminuted fracture of shaft of right humerus with routine healing      RUE sling   no swelling in hand   Therapy   pain management         COPD (chronic obstructive pulmonary disease) (CMS/[facility])     Stable monitor           Medications, treatments, and labs reviewed  Continue medications and treatments as listed in [facility]    Scribe Attestation  IKrissy Scribe   attest that this documentation has been prepared under the direction and in the presence of BLAS Wang.    Provider Attestation - Scribe documentation  All medical record entries made by the Scribe were at my direction and personally dictated by me. I have reviewed the chart and agree that the record accurately reflects my personal performance of the history, physical exam, discussion and plan.    BLAS Wang        Electronically Signed By: BLAS Wang   9/28/23  2:00 PM

## 2023-08-17 ENCOUNTER — NURSING HOME VISIT (OUTPATIENT)
Dept: POST ACUTE CARE | Facility: EXTERNAL LOCATION | Age: 88
End: 2023-08-17
Payer: MEDICARE

## 2023-08-17 DIAGNOSIS — I48.20 CHRONIC ATRIAL FIBRILLATION (MULTI): Primary | ICD-10-CM

## 2023-08-17 DIAGNOSIS — R60.0 LEG EDEMA: ICD-10-CM

## 2023-08-17 DIAGNOSIS — R53.1 WEAKNESS: ICD-10-CM

## 2023-08-17 DIAGNOSIS — S42.351D CLOSED DISPLACED COMMINUTED FRACTURE OF SHAFT OF RIGHT HUMERUS WITH ROUTINE HEALING: ICD-10-CM

## 2023-08-17 DIAGNOSIS — I50.9 CONGESTIVE HEART FAILURE, UNSPECIFIED HF CHRONICITY, UNSPECIFIED HEART FAILURE TYPE (MULTI): ICD-10-CM

## 2023-08-17 PROCEDURE — 99309 SBSQ NF CARE MODERATE MDM 30: CPT

## 2023-08-17 NOTE — LETTER
Patient: Ermelinda Benoit  : 3/14/1933    Encounter Date: 2023    PROGRESS NOTE    Subjective  Chief complaint: Ermelinda Benoit is a 89 y.o. female who is an acute skilled patient being seen and evaluated for weakness    HPI:  Patient seen for general medical care.  A-fib is rate controlled.. No palpitations.  COPD is stable. No wheezing.  HTN is controlled. BP at goal for age.    23 Patient  admitted to SNF for PT OT S/P fall.  Patient has comminuted fracture right humerus.  PMH COPD CVA right foot ulcer HTN A-fib HF AS HDL diverticulosis cholelithiasis.   Patient at baseline mentation, cooperative, pleasant Patient working with PT on gait training and transfers, and with ST for speech.  Appetite good. Sleeping well.  Denies F/C/N/V/D, cough, SOB, chest pain.  RUE in sling.    23 Patient sitting up at bedside. States working in therapy and is now sore  in R shoulder. No injury or overuse. States sleeping well and appetite is good. RUE remains in sling. Offers no c/o f/c/n/v/d cough sob chest pain.    23  Patient continues to work with therapy for transfers gait training and mobility.  States feeling better today.  Continues to wear his sling.  MSPs intact.  Patient offers no complaints at time.  No concerns per nursing.    8/10/23Patient states doing well today.  Sitting up at bedside wearing sling RUE.  MSPs intact.  Continues to work with therapy for gait training and transfers.  Patient offers no complaints at time.  Pain managed.  HTN and COPD stable.  No concerns per nursing    23 Patient states doing well today-offers no complaints.  Continues to work in therapy for  balance, strengthening, mobility, and ADLs.  RUE remains in sling.  Pain managed.  HTN stable- no headache, dizziness   A-fib stable-  no SOB, chest pain. No concerns per nursing.        Objective  Vital signs:  146/60-98.0-67-18-96%    Physical Exam  Constitutional:       Appearance: Normal appearance.   HENT:       Head: Normocephalic.      Mouth/Throat:      Mouth: Mucous membranes are moist.   Eyes:      Extraocular Movements: Extraocular movements intact.   Cardiovascular:      Rate and Rhythm: Normal rate. Rhythm irregular.      Pulses: Normal pulses.      Heart sounds: Normal heart sounds.   Pulmonary:      Breath sounds: Normal breath sounds.   Abdominal:      General: Bowel sounds are normal.      Palpations: Abdomen is soft.   Musculoskeletal:         General: Normal range of motion.      Cervical back: Normal range of motion and neck supple.      Right lower leg: Edema present.      Left lower leg: Edema present.      Comments:   Generalized weakness   RUE in sling   MSP intact   Skin:     General: Skin is warm and dry.      Capillary Refill: Capillary refill takes less than 2 seconds.   Neurological:      Mental Status: She is alert. Mental status is at baseline.   Psychiatric:         Behavior: Behavior normal.         Assessment/Plan  Problem List Items Addressed This Visit       CHF (congestive heart failure) (CMS/HCC)      Stable   no SOB, Rales   BLE edema +1   Torsemide   monitor         Leg edema      Stable   elevate compression hose   Torsemide   monitor         Weakness      therapy         Closed displaced comminuted fracture of shaft of right humerus with routine healing      RUE sling   MSP intact   no swelling in hand   Therapy   pain management         Atrial fibrillation (CMS/HCC) - Primary       Stable   no SOB palpitations dizziness   Eliquis   bleeding precautions   monitor          Medications, treatments, and labs reviewed  Continue medications and treatments as listed in EMR    BLAS Bradley      Electronically Signed By: BLAS Bradley   8/20/23  9:34 AM

## 2023-08-18 ENCOUNTER — NURSING HOME VISIT (OUTPATIENT)
Dept: POST ACUTE CARE | Facility: EXTERNAL LOCATION | Age: 88
End: 2023-08-18
Payer: MEDICARE

## 2023-08-18 DIAGNOSIS — S42.351D CLOSED DISPLACED COMMINUTED FRACTURE OF SHAFT OF RIGHT HUMERUS WITH ROUTINE HEALING: ICD-10-CM

## 2023-08-18 DIAGNOSIS — R53.1 WEAKNESS: Primary | ICD-10-CM

## 2023-08-18 DIAGNOSIS — J44.9 CHRONIC OBSTRUCTIVE PULMONARY DISEASE, UNSPECIFIED COPD TYPE (MULTI): ICD-10-CM

## 2023-08-18 PROCEDURE — 99308 SBSQ NF CARE LOW MDM 20: CPT | Performed by: REGISTERED NURSE

## 2023-08-18 NOTE — LETTER
Patient: Ermelinda Benoit  : 3/14/1933    Encounter Date: 2023    PROGRESS NOTE    Subjective  Chief complaint: Ermelinda Benoit is a 89 y.o. female who is an acute skilled patient being seen and evaluated for weakness    HPI:  Patient seen for general medical care.  A-fib is rate controlled.. No palpitations.  COPD is stable. No wheezing.  HTN is controlled. BP at goal for age.    23 Patient  admitted to SNF for PT OT S/P fall.  Patient has comminuted fracture right humerus.  PMH COPD CVA right foot ulcer HTN A-fib HF AS HDL diverticulosis cholelithiasis.   Patient at baseline mentation, cooperative, pleasant Patient working with PT on gait training and transfers, and with ST for speech.  Appetite good. Sleeping well.  Denies F/C/N/V/D, cough, SOB, chest pain.  RUE in sling.    23 Patient sitting up at bedside. States working in therapy and is now sore  in R shoulder. No injury or overuse. States sleeping well and appetite is good. RUE remains in sling. Offers no c/o f/c/n/v/d cough sob chest pain.    23  Patient continues to work with therapy for transfers gait training and mobility.  States feeling better today.  Continues to wear his sling.  MSPs intact.  Patient offers no complaints at time.  No concerns per nursing.    8/10/23Patient states doing well today.  Sitting up at bedside wearing sling RUE.  MSPs intact.  Continues to work with therapy for gait training and transfers.  Patient offers no complaints at time.  Pain managed.  HTN and COPD stable.  No concerns per nursing    23 Patient states doing well today-offers no complaints.  Continues to work in therapy for  balance, strengthening, mobility, and ADLs.  RUE remains in sling.  Pain managed.  HTN stable- no headache, dizziness   A-fib stable-  no SOB, chest pain. No concerns per nursing.    23 Patient in therapy d/t generalized weakness.  Patient presents for f/u.  Continues to work toward goals in therapy.  No new  complaints at this time.      Objective  Vital signs: 146/60     Physical Exam  Constitutional:       General: She is not in acute distress.  Eyes:      Extraocular Movements: Extraocular movements intact.   Pulmonary:      Effort: Pulmonary effort is normal.   Musculoskeletal:      Cervical back: Neck supple.   Neurological:      Mental Status: She is alert.   Psychiatric:         Mood and Affect: Mood normal.         Behavior: Behavior is cooperative.         Assessment/Plan  Problem List Items Addressed This Visit       Weakness - Primary     PT OT eval and treat         Closed displaced comminuted fracture of shaft of right humerus with routine healing      RUE sling   no swelling in hand   Therapy   pain management         COPD (chronic obstructive pulmonary disease) (CMS/[facility])     Stable monitor           Medications, treatments, and labs reviewed  Continue medications and treatments as listed in [facility]    Scribe Attestation  IKrissy Scribe   attest that this documentation has been prepared under the direction and in the presence of BLAS Wang.    Provider Attestation - Scribe documentation  All medical record entries made by the Scribe were at my direction and personally dictated by me. I have reviewed the chart and agree that the record accurately reflects my personal performance of the history, physical exam, discussion and plan.    BLAS Wang        Electronically Signed By: BLAS Wang   9/27/23  6:17 PM

## 2023-08-20 NOTE — PROGRESS NOTES
PROGRESS NOTE    Subjective   Chief complaint: Ermelinda Benoit is a 89 y.o. female who is an acute skilled patient being seen and evaluated for weakness    HPI:  Patient seen for general medical care.  A-fib is rate controlled.. No palpitations.  COPD is stable. No wheezing.  HTN is controlled. BP at goal for age.    7/20/23 Patient  admitted to SNF for PT OT S/P fall.  Patient has comminuted fracture right humerus.  PMH COPD CVA right foot ulcer HTN A-fib HF AS HDL diverticulosis cholelithiasis.   Patient at baseline mentation, cooperative, pleasant Patient working with PT on gait training and transfers, and with ST for speech.  Appetite good. Sleeping well.  Denies F/C/N/V/D, cough, SOB, chest pain.  RUE in sling.    8/1/23 Patient sitting up at bedside. States working in therapy and is now sore  in R shoulder. No injury or overuse. States sleeping well and appetite is good. RUE remains in sling. Offers no c/o f/c/n/v/d cough sob chest pain.    8/2/23  Patient continues to work with therapy for transfers gait training and mobility.  States feeling better today.  Continues to wear his sling.  MSPs intact.  Patient offers no complaints at time.  No concerns per nursing.    8/10/23Patient states doing well today.  Sitting up at bedside wearing sling RUE.  MSPs intact.  Continues to work with therapy for gait training and transfers.  Patient offers no complaints at time.  Pain managed.  HTN and COPD stable.  No concerns per nursing    8/17/23 Patient states doing well today-offers no complaints.  Continues to work in therapy for  balance, strengthening, mobility, and ADLs.  RUE remains in sling.  Pain managed.  HTN stable- no headache, dizziness   A-fib stable-  no SOB, chest pain. No concerns per nursing.        Objective   Vital signs:  146/60-98.0-67-18-96%    Physical Exam  Constitutional:       Appearance: Normal appearance.   HENT:      Head: Normocephalic.      Mouth/Throat:      Mouth: Mucous membranes are  moist.   Eyes:      Extraocular Movements: Extraocular movements intact.   Cardiovascular:      Rate and Rhythm: Normal rate. Rhythm irregular.      Pulses: Normal pulses.      Heart sounds: Normal heart sounds.   Pulmonary:      Breath sounds: Normal breath sounds.   Abdominal:      General: Bowel sounds are normal.      Palpations: Abdomen is soft.   Musculoskeletal:         General: Normal range of motion.      Cervical back: Normal range of motion and neck supple.      Right lower leg: Edema present.      Left lower leg: Edema present.      Comments:   Generalized weakness   RUE in sling   MSP intact   Skin:     General: Skin is warm and dry.      Capillary Refill: Capillary refill takes less than 2 seconds.   Neurological:      Mental Status: She is alert. Mental status is at baseline.   Psychiatric:         Behavior: Behavior normal.         Assessment/Plan   Problem List Items Addressed This Visit       CHF (congestive heart failure) (CMS/HCC)      Stable   no SOB, Rales   BLE edema +1   Torsemide   monitor         Leg edema      Stable   elevate compression hose   Torsemide   monitor         Weakness      therapy         Closed displaced comminuted fracture of shaft of right humerus with routine healing      RUE sling   MSP intact   no swelling in hand   Therapy   pain management         Atrial fibrillation (CMS/HCC) - Primary       Stable   no SOB palpitations dizziness   Eliquis   bleeding precautions   monitor          Medications, treatments, and labs reviewed  Continue medications and treatments as listed in EMR    MARK Bradley-CNP

## 2023-08-22 ENCOUNTER — NURSING HOME VISIT (OUTPATIENT)
Dept: POST ACUTE CARE | Facility: EXTERNAL LOCATION | Age: 88
End: 2023-08-22
Payer: MEDICARE

## 2023-08-22 DIAGNOSIS — S42.351D CLOSED DISPLACED COMMINUTED FRACTURE OF SHAFT OF RIGHT HUMERUS WITH ROUTINE HEALING: ICD-10-CM

## 2023-08-22 DIAGNOSIS — J44.9 CHRONIC OBSTRUCTIVE PULMONARY DISEASE, UNSPECIFIED COPD TYPE (MULTI): ICD-10-CM

## 2023-08-22 DIAGNOSIS — R53.1 WEAKNESS: Primary | ICD-10-CM

## 2023-08-22 DIAGNOSIS — I50.9 CONGESTIVE HEART FAILURE, UNSPECIFIED HF CHRONICITY, UNSPECIFIED HEART FAILURE TYPE (MULTI): ICD-10-CM

## 2023-08-22 PROCEDURE — 99309 SBSQ NF CARE MODERATE MDM 30: CPT | Performed by: REGISTERED NURSE

## 2023-08-22 NOTE — LETTER
Patient: Ermelinda Benoit  : 3/14/1933    Encounter Date: 2023    PROGRESS NOTE    Subjective  Chief complaint: Ermelinda Benoit is a 89 y.o. female who is an acute skilled patient being seen and evaluated for weakness    HPI:  Patient seen for general medical care.  A-fib is rate controlled.. No palpitations.  COPD is stable. No wheezing.  HTN is controlled. BP at goal for age.    23 Patient  admitted to SNF for PT OT S/P fall.  Patient has comminuted fracture right humerus.  PMH COPD CVA right foot ulcer HTN A-fib HF AS HDL diverticulosis cholelithiasis.   Patient at baseline mentation, cooperative, pleasant Patient working with PT on gait training and transfers, and with ST for speech.  Appetite good. Sleeping well.  Denies F/C/N/V/D, cough, SOB, chest pain.  RUE in sling.    23 Patient sitting up at bedside. States working in therapy and is now sore  in R shoulder. No injury or overuse. States sleeping well and appetite is good. RUE remains in sling. Offers no c/o f/c/n/v/d cough sob chest pain.    23  Patient continues to work with therapy for transfers gait training and mobility.  States feeling better today.  Continues to wear his sling.  MSPs intact.  Patient offers no complaints at time.  No concerns per nursing.    8/10/23Patient states doing well today.  Sitting up at bedside wearing sling RUE.  MSPs intact.  Continues to work with therapy for gait training and transfers.  Patient offers no complaints at time.  Pain managed.  HTN and COPD stable.  No concerns per nursing    23 Patient states doing well today-offers no complaints.  Continues to work in therapy for  balance, strengthening, mobility, and ADLs.  RUE remains in sling.  Pain managed.  HTN stable- no headache, dizziness   A-fib stable-  no SOB, chest pain. No concerns per nursing.    23 Patient presents for f/u therapy and general medical care.  Patient seen and examined at beside.  Therapy has been working with  the patient to improve strength, endurance, ADLs, and transfers d/t generalized weakness.  Patient has no acute concerns today.        Objective  Vital signs: 148/54, 98.8, 67, 98%    Physical Exam  Constitutional:       General: She is not in acute distress.  Eyes:      Extraocular Movements: Extraocular movements intact.   Pulmonary:      Effort: Pulmonary effort is normal.   Musculoskeletal:      Cervical back: Neck supple.   Neurological:      Mental Status: She is alert.   Psychiatric:         Mood and Affect: Mood normal.         Behavior: Behavior is cooperative.         Assessment/Plan  Problem List Items Addressed This Visit       CHF (congestive heart failure) (CMS/[facility])     Monitor weight low sodium diet          Weakness - Primary     PT OT eval and treat         Closed displaced comminuted fracture of shaft of right humerus with routine healing      RUE sling   no swelling in hand   Therapy   pain management         COPD (chronic obstructive pulmonary disease) (CMS/[facility])     Stable monitor           Medications, treatments, and labs reviewed  Continue medications and treatments as listed in [facility]    Scribe Attestation  Krissy MURILLO Scribe   attest that this documentation has been prepared under the direction and in the presence of BLAS Wang.    Provider Attestation - Scribe documentation  All medical record entries made by the Scribe were at my direction and personally dictated by me. I have reviewed the chart and agree that the record accurately reflects my personal performance of the history, physical exam, discussion and plan.    BLAS Wang        Electronically Signed By: BLAS Wang   9/21/23 11:03 AM

## 2023-08-23 ENCOUNTER — NURSING HOME VISIT (OUTPATIENT)
Dept: POST ACUTE CARE | Facility: EXTERNAL LOCATION | Age: 88
End: 2023-08-23
Payer: MEDICARE

## 2023-08-23 DIAGNOSIS — R53.1 WEAKNESS: Primary | ICD-10-CM

## 2023-08-23 DIAGNOSIS — S42.351D CLOSED DISPLACED COMMINUTED FRACTURE OF SHAFT OF RIGHT HUMERUS WITH ROUTINE HEALING: ICD-10-CM

## 2023-08-23 DIAGNOSIS — J44.9 CHRONIC OBSTRUCTIVE PULMONARY DISEASE, UNSPECIFIED COPD TYPE (MULTI): ICD-10-CM

## 2023-08-23 PROCEDURE — 99308 SBSQ NF CARE LOW MDM 20: CPT | Performed by: REGISTERED NURSE

## 2023-08-23 NOTE — LETTER
Patient: Ermelinda Benoit  : 3/14/1933    Encounter Date: 2023    PROGRESS NOTE    Subjective  Chief complaint: Ermelinda Benoit is a 89 y.o. female who is an acute skilled patient being seen and evaluated for weakness    HPI:  Patient seen for general medical care.  A-fib is rate controlled.. No palpitations.  COPD is stable. No wheezing.  HTN is controlled. BP at goal for age.    23 Patient  admitted to SNF for PT OT S/P fall.  Patient has comminuted fracture right humerus.  PMH COPD CVA right foot ulcer HTN A-fib HF AS HDL diverticulosis cholelithiasis.   Patient at baseline mentation, cooperative, pleasant Patient working with PT on gait training and transfers, and with ST for speech.  Appetite good. Sleeping well.  Denies F/C/N/V/D, cough, SOB, chest pain.  RUE in sling.    23 Patient sitting up at bedside. States working in therapy and is now sore  in R shoulder. No injury or overuse. States sleeping well and appetite is good. RUE remains in sling. Offers no c/o f/c/n/v/d cough sob chest pain.    23  Patient continues to work with therapy for transfers gait training and mobility.  States feeling better today.  Continues to wear his sling.  MSPs intact.  Patient offers no complaints at time.  No concerns per nursing.    8/10/23Patient states doing well today.  Sitting up at bedside wearing sling RUE.  MSPs intact.  Continues to work with therapy for gait training and transfers.  Patient offers no complaints at time.  Pain managed.  HTN and COPD stable.  No concerns per nursing    23 Patient states doing well today-offers no complaints.  Continues to work in therapy for  balance, strengthening, mobility, and ADLs.  RUE remains in sling.  Pain managed.  HTN stable- no headache, dizziness   A-fib stable-  no SOB, chest pain. No concerns per nursing.    23 Patient presents for f/u therapy and general medical care.  Patient seen and examined at beside.  Therapy has been working with  the patient to improve strength, endurance, ADLs, and transfers d/t generalized weakness.  Patient has no acute concerns today.    8/23/23 Patient has been working in therapy to improve strength, endurance, and ADLs.  Patient continues to work toward goals.  No new concerns today.  Denies n/v/f/c pain.            Objective  Vital signs: 166/93, 97.3, 54, 95%    Physical Exam  Constitutional:       General: She is not in acute distress.  Eyes:      Extraocular Movements: Extraocular movements intact.   Pulmonary:      Effort: Pulmonary effort is normal.   Musculoskeletal:      Cervical back: Neck supple.   Neurological:      Mental Status: She is alert.   Psychiatric:         Mood and Affect: Mood normal.         Behavior: Behavior is cooperative.         Assessment/Plan  Problem List Items Addressed This Visit       Weakness - Primary     PT OT eval and treat         Closed displaced comminuted fracture of shaft of right humerus with routine healing      RUE sling   no swelling in hand   Therapy   pain management         COPD (chronic obstructive pulmonary disease) (CMS/[facility])     Stable monitor           Medications, treatments, and labs reviewed  Continue medications and treatments as listed in [facility]    Scribe Attestation  IKrissy Scribe   attest that this documentation has been prepared under the direction and in the presence of BLAS Wang.    Provider Attestation - Scribe documentation  All medical record entries made by the Scribe were at my direction and personally dictated by me. I have reviewed the chart and agree that the record accurately reflects my personal performance of the history, physical exam, discussion and plan.    BLAS Wang        Electronically Signed By: BLAS Wang   9/28/23 10:10 AM

## 2023-08-24 ENCOUNTER — NURSING HOME VISIT (OUTPATIENT)
Dept: POST ACUTE CARE | Facility: EXTERNAL LOCATION | Age: 88
End: 2023-08-24
Payer: MEDICARE

## 2023-08-24 DIAGNOSIS — J44.9 CHRONIC OBSTRUCTIVE PULMONARY DISEASE, UNSPECIFIED COPD TYPE (MULTI): ICD-10-CM

## 2023-08-24 DIAGNOSIS — R60.0 LEG EDEMA: ICD-10-CM

## 2023-08-24 DIAGNOSIS — I48.20 CHRONIC ATRIAL FIBRILLATION (MULTI): Primary | ICD-10-CM

## 2023-08-24 DIAGNOSIS — S42.351D CLOSED DISPLACED COMMINUTED FRACTURE OF SHAFT OF RIGHT HUMERUS WITH ROUTINE HEALING: ICD-10-CM

## 2023-08-24 DIAGNOSIS — R53.1 WEAKNESS: ICD-10-CM

## 2023-08-24 DIAGNOSIS — I10 HYPERTENSION, ESSENTIAL: ICD-10-CM

## 2023-08-24 PROCEDURE — 99309 SBSQ NF CARE MODERATE MDM 30: CPT

## 2023-08-24 NOTE — LETTER
Patient: Ermelinda Benoit  : 3/14/1933    Encounter Date: 2023    PROGRESS NOTE    Subjective  Chief complaint: Ermelinda Benoit is a 89 y.o. female who is a long term care patient being seen and evaluated for general medical care and follow-up.    HPI:  HPI  Objective  Vital signs: 166/93-97.3-54-18-95%    Physical Exam  Constitutional:       Appearance: Normal appearance.   HENT:      Head: Normocephalic.      Mouth/Throat:      Mouth: Mucous membranes are moist.   Eyes:      Extraocular Movements: Extraocular movements intact.   Cardiovascular:      Rate and Rhythm: Normal rate. Rhythm irregular.      Pulses: Normal pulses.      Heart sounds: Murmur heard.   Pulmonary:      Effort: Pulmonary effort is normal.      Breath sounds: Normal breath sounds.   Abdominal:      General: Bowel sounds are normal.      Palpations: Abdomen is soft.   Musculoskeletal:         General: Normal range of motion.      Cervical back: Normal range of motion and neck supple.      Comments: BLE edema   Skin:     General: Skin is warm and dry.      Capillary Refill: Capillary refill takes less than 2 seconds.   Neurological:      Mental Status: She is alert. Mental status is at baseline.   Psychiatric:         Mood and Affect: Mood normal.         Behavior: Behavior normal.         Assessment/Plan  Problem List Items Addressed This Visit       Leg edema      Stable   elevate compression hose   Torsemide   monitor         Weakness     therapy         Closed displaced comminuted fracture of shaft of right humerus with routine healing      RUE sling   Therapy   Pain management         COPD (chronic obstructive pulmonary disease) (CMS/HCC)     Stable   no SOB, cough, wheeze   monitor         Hypertension, essential     staple   losartan   monitor         Atrial fibrillation (CMS/[facility]) - Primary      Stable   no SOB palpitations dizziness   Eliquis   bleeding precautions   monitor          Medications, treatments, and labs  reviewed  Continue medications and treatments as listed in EMR    BLAS Bradley      Electronically Signed By: BLAS Bradley   8/28/23  8:10 PM

## 2023-08-24 NOTE — PROGRESS NOTES
PROGRESS NOTE  Subjective   Chief complaint: Ermelinda Benoit is a 89 y.o. female who is a acute skilled care patient being seen and evaluated for weakness     HPI:  HPI patient denies pain no shortness of breath no fever chills participating therapy  Objective   Vital signs: 141/71  Physical Exam  Constitutional:       General: She is not in acute distress.  Eyes:      Extraocular Movements: Extraocular movements intact.   Pulmonary:      Effort: Pulmonary effort is normal.   Musculoskeletal:      Cervical back: Neck supple.   Neurological:      Mental Status: She is alert.   Psychiatric:         Mood and Affect: Mood normal.         Behavior: Behavior is cooperative.         Assessment/Plan   Problem List Items Addressed This Visit       CHF (congestive heart failure) (CMS/[facility])     Monitor weight low sodium diet          Weakness - Primary     PT OT eval and treat         Closed displaced comminuted fracture of shaft of right humerus with routine healing     Medications, treatments, and labs reviewed  Continue medications and treatments as listed in EMR    Mckay Ritchie, APRN-CNP

## 2023-08-25 ENCOUNTER — NURSING HOME VISIT (OUTPATIENT)
Dept: POST ACUTE CARE | Facility: EXTERNAL LOCATION | Age: 88
End: 2023-08-25
Payer: MEDICARE

## 2023-08-25 DIAGNOSIS — J44.9 CHRONIC OBSTRUCTIVE PULMONARY DISEASE, UNSPECIFIED COPD TYPE (MULTI): ICD-10-CM

## 2023-08-25 DIAGNOSIS — I10 HYPERTENSION, ESSENTIAL: ICD-10-CM

## 2023-08-25 DIAGNOSIS — I50.9 CONGESTIVE HEART FAILURE, UNSPECIFIED HF CHRONICITY, UNSPECIFIED HEART FAILURE TYPE (MULTI): Primary | ICD-10-CM

## 2023-08-25 DIAGNOSIS — S42.351D CLOSED DISPLACED COMMINUTED FRACTURE OF SHAFT OF RIGHT HUMERUS WITH ROUTINE HEALING: ICD-10-CM

## 2023-08-25 PROCEDURE — 99309 SBSQ NF CARE MODERATE MDM 30: CPT | Performed by: REGISTERED NURSE

## 2023-08-25 NOTE — LETTER
Patient: Ermelinda Benoit  : 3/14/1933    Encounter Date: 2023    PROGRESS NOTE    Subjective  Chief complaint: Ermelinda Benoit is a 89 y.o. female who is an acute skilled patient being seen and evaluated for weakness    HPI:  Patient seen for general medical care.  A-fib is rate controlled.. No palpitations.  COPD is stable. No wheezing.  HTN is controlled. BP at goal for age.    23 Patient  admitted to SNF for PT OT S/P fall.  Patient has comminuted fracture right humerus.  PMH COPD CVA right foot ulcer HTN A-fib HF AS HDL diverticulosis cholelithiasis.   Patient at baseline mentation, cooperative, pleasant Patient working with PT on gait training and transfers, and with ST for speech.  Appetite good. Sleeping well.  Denies F/C/N/V/D, cough, SOB, chest pain.  RUE in sling.    23 Patient sitting up at bedside. States working in therapy and is now sore  in R shoulder. No injury or overuse. States sleeping well and appetite is good. RUE remains in sling. Offers no c/o f/c/n/v/d cough sob chest pain.    23  Patient continues to work with therapy for transfers gait training and mobility.  States feeling better today.  Continues to wear his sling.  MSPs intact.  Patient offers no complaints at time.  No concerns per nursing.    8/10/23Patient states doing well today.  Sitting up at bedside wearing sling RUE.  MSPs intact.  Continues to work with therapy for gait training and transfers.  Patient offers no complaints at time.  Pain managed.  HTN and COPD stable.  No concerns per nursing    23 Patient states doing well today-offers no complaints.  Continues to work in therapy for  balance, strengthening, mobility, and ADLs.  RUE remains in sling.  Pain managed.  HTN stable- no headache, dizziness   A-fib stable-  no SOB, chest pain. No concerns per nursing.    23 Patient presents for f/u therapy and general medical care.  Patient seen and examined at beside.  Therapy has been working with  the patient to improve strength, endurance, ADLs, and transfers d/t generalized weakness.  Patient has no acute concerns today.    8/23/23 Patient has been working in therapy to improve strength, endurance, and ADLs.  Patient continues to work toward goals.  No new concerns today.  Denies n/v/f/c pain.      8/25/23   Therapy has been working with the patient to improve strength and endurance with ADLs, transfers, and mobility.  Patient continues to work toward goals.  Patient is stable and has no new complaints.  Nursing staff voices no new concerns today.        Objective  Vital signs: 153/74, 97.4,73, 93%    Physical Exam  Constitutional:       General: She is not in acute distress.  Eyes:      Extraocular Movements: Extraocular movements intact.   Pulmonary:      Effort: Pulmonary effort is normal.   Musculoskeletal:      Cervical back: Neck supple.   Neurological:      Mental Status: She is alert.   Psychiatric:         Mood and Affect: Mood normal.         Behavior: Behavior is cooperative.         Assessment/Plan  Problem List Items Addressed This Visit       CHF (congestive heart failure) (CMS/[facility]) - Primary     Monitor weight low sodium diet          Closed displaced comminuted fracture of shaft of right humerus with routine healing      RUE sling   no swelling in hand   Therapy   pain management         COPD (chronic obstructive pulmonary disease) (CMS/[facility])     Stable monitor          Hypertension, essential     staple   losartan   monitor          Medications, treatments, and labs reviewed  Continue medications and treatments as listed in PCC    Scribe Attestation  IKrissy Scribe   attest that this documentation has been prepared under the direction and in the presence of BLAS Wang.    Provider Attestation - Scribe documentation  All medical record entries made by the Scribe were at my direction and personally dictated by me. I have reviewed the chart and agree that the  record accurately reflects my personal performance of the history, physical exam, discussion and plan.    BLAS Wang        Electronically Signed By: BLAS Wang   9/27/23  3:58 PM

## 2023-08-28 ENCOUNTER — NURSING HOME VISIT (OUTPATIENT)
Dept: POST ACUTE CARE | Facility: EXTERNAL LOCATION | Age: 88
End: 2023-08-28
Payer: MEDICARE

## 2023-08-28 DIAGNOSIS — I48.21 PERMANENT ATRIAL FIBRILLATION (MULTI): ICD-10-CM

## 2023-08-28 DIAGNOSIS — I63.432 CEREBROVASCULAR ACCIDENT (CVA) DUE TO EMBOLISM OF LEFT POSTERIOR CEREBRAL ARTERY (MULTI): Primary | ICD-10-CM

## 2023-08-28 DIAGNOSIS — I10 HYPERTENSION, ESSENTIAL: ICD-10-CM

## 2023-08-28 DIAGNOSIS — J44.9 CHRONIC OBSTRUCTIVE PULMONARY DISEASE, UNSPECIFIED COPD TYPE (MULTI): ICD-10-CM

## 2023-08-28 DIAGNOSIS — I50.9 CONGESTIVE HEART FAILURE, UNSPECIFIED HF CHRONICITY, UNSPECIFIED HEART FAILURE TYPE (MULTI): ICD-10-CM

## 2023-08-28 PROCEDURE — 99309 SBSQ NF CARE MODERATE MDM 30: CPT | Performed by: INTERNAL MEDICINE

## 2023-08-28 NOTE — PROGRESS NOTES
PROGRESS NOTE    Subjective   Chief complaint: Ermelinda Benoit is a 89 y.o. female who is an acute skilled patient being seen and evaluated for weakness    HPI:  7/20/23  patient  admitted to SNF for PT OT S/P fall.  Patient has comminuted fracture right humerus.  PMH COPD CVA right foot ulcer HTN A-fib HF AS HDL diverticulosis cholelithiasis.   Patient at baseline mentation, cooperative, pleasant Patient working with PT on gait training and transfers, and with ST for speech.  Appetite good. Sleeping well.  Denies F/C/N/V/D, cough, SOB, chest pain.  RUE in sling.    7/25/23 Patient has been working in therapy to improve strength, endurance, and ADLs.  Patient continues to work toward goals.  No new concerns today.  Denies n/v/f/c pain.        Objective   Vital signs: 136/70,74,96% RA    Physical Exam  Constitutional:       General: She is not in acute distress.  Eyes:      Extraocular Movements: Extraocular movements intact.   Pulmonary:      Effort: Pulmonary effort is normal.   Musculoskeletal:      Cervical back: Neck supple.   Neurological:      Mental Status: She is alert.   Psychiatric:         Mood and Affect: Mood normal.         Behavior: Behavior is cooperative.         Assessment/Plan   Problem List Items Addressed This Visit       CHF (congestive heart failure) (CMS/[facility])     Monitor weight low sodium diet          Weakness - Primary     PT OT eval and treat         Closed displaced comminuted fracture of shaft of right humerus with routine healing      RUE sling   no swelling in hand   Therapy   pain management          Medications, treatments, and labs reviewed  Continue medications and treatments as listed in EMR    Scribe Attestation  I, Nancy Santamaria   attest that this documentation has been prepared under the direction and in the presence of MARK Wang-CNP.     Provider Attestation - Scribe documentation  All medical record entries made by the Scribe were at my  direction and personally dictated by me. I have reviewed the chart and agree that the record accurately reflects my personal performance of the history, physical exam, discussion and plan.   Mckay Ritchie, APRN-CNP

## 2023-08-28 NOTE — LETTER
Patient: Ermelinda Benoit  : 3/14/1933    Encounter Date: 2023    PROGRESS NOTE    Subjective  Chief complaint: Ermelinda Benoit is a 89 y.o. female patient being seen and evaluated for monthly general medical care and follow-up    HPI:  23 Patient presents for general medical care and f/u.  Patient seen and examined at bedside.  No issues per nursing.  Patient has no acute complaints.  Respiratory status is at his baseline.  Blood pressure controlled.  A-fib is rate controlled.          Objective  Vital signs: Reviewed    Physical Exam  Constitutional:       General: She is not in acute distress.  Eyes:      Extraocular Movements: Extraocular movements intact.   Pulmonary:      Effort: Pulmonary effort is normal.   Musculoskeletal:      Cervical back: Neck supple.   Neurological:      Mental Status: She is alert.   Psychiatric:         Mood and Affect: Mood normal.         Behavior: Behavior is cooperative.         Assessment/Plan  Problem List Items Addressed This Visit       CHF (congestive heart failure) (CMS/HCC)     Continue current medications         COPD (chronic obstructive pulmonary disease) (CMS/[facility])     Continue current medications         Hypertension, essential     Continue current medications         Cerebrovascular accident (CVA) due to embolism of left posterior cerebral artery (CMS/HCC) - Primary     Continue current medications         Permanent atrial fibrillation (CMS/HCC)     Continue current medications          Medications, treatments, and labs reviewed  Continue medications and treatments as listed in EMR    Scribe Attestation  I, Nancy Us   attest that this documentation has been prepared under the direction and in the presence of Mckay Arita DO.    Provider Attestation - Scribe documentation  All medical record entries made by the Scribe were at my direction and personally dictated by me. I have reviewed the chart and agree that the record accurately  reflects my personal performance of the history, physical exam, discussion and plan.    cMkay Arita DO      Electronically Signed By: Mckay Arita DO   1/17/24  9:02 PM

## 2023-08-29 NOTE — PROGRESS NOTES
PROGRESS NOTE    Subjective   Chief complaint: Ermelinda Benoit is a 89 y.o. female who is a long term care patient being seen and evaluated for general medical care and follow-up.    HPI:  Patient seen for general medical care.  A-fib is rate controlled.. No palpitations.  COPD is stable. No wheezing.  HTN is controlled. BP at goal for age.    7/20/23 Patient  admitted to SNF for PT OT S/P fall.  Patient has comminuted fracture right humerus.  PMH COPD CVA right foot ulcer HTN A-fib HF AS HDL diverticulosis cholelithiasis.   Patient at baseline mentation, cooperative, pleasant Patient working with PT on gait training and transfers, and with ST for speech.  Appetite good. Sleeping well.  Denies F/C/N/V/D, cough, SOB, chest pain.  RUE in sling.    8/1/23 Patient sitting up at bedside. States working in therapy and is now sore  in R shoulder. No injury or overuse. States sleeping well and appetite is good. RUE remains in sling. Offers no c/o f/c/n/v/d cough sob chest pain.    8/2/23  Patient continues to work with therapy for transfers gait training and mobility.  States feeling better today.  Continues to wear his sling.  MSPs intact.  Patient offers no complaints at time.  No concerns per nursing.    8/10/23Patient states doing well today.  Sitting up at bedside wearing sling RUE.  MSPs intact.  Continues to work with therapy for gait training and transfers.  Patient offers no complaints at time.  Pain managed.  HTN and COPD stable.  No concerns per nursing    8/17/23 Patient states doing well today-offers no complaints.  Continues to work in therapy for  balance, strengthening, mobility, and ADLs.  RUE remains in sling.  Pain managed.  HTN stable- no headache, dizziness   A-fib stable-  no SOB, chest pain. No concerns per nursing.    8/24/23 Patient continues to work with therapy for mobility, strengthening and balance. AF stable- no sob, dizziness, palpitations. Appetite stable. Sleeping well. No concerns per  nursing.        Objective   Vital signs: 166/93-97.3-54-18-95%    Physical Exam  Constitutional:       Appearance: Normal appearance.   HENT:      Head: Normocephalic.      Mouth/Throat:      Mouth: Mucous membranes are moist.   Eyes:      Extraocular Movements: Extraocular movements intact.   Cardiovascular:      Rate and Rhythm: Normal rate. Rhythm irregular.      Pulses: Normal pulses.      Heart sounds: Murmur heard.   Pulmonary:      Effort: Pulmonary effort is normal.      Breath sounds: Normal breath sounds.   Abdominal:      General: Bowel sounds are normal.      Palpations: Abdomen is soft.   Musculoskeletal:         General: Normal range of motion.      Cervical back: Normal range of motion and neck supple.      Comments: BLE edema  RUE sling- MSPs intact   Skin:     General: Skin is warm and dry.      Capillary Refill: Capillary refill takes less than 2 seconds.   Neurological:      Mental Status: She is alert. Mental status is at baseline.   Psychiatric:         Mood and Affect: Mood normal.         Behavior: Behavior normal.         Assessment/Plan   Problem List Items Addressed This Visit       Leg edema      Stable   elevate compression hose   Torsemide   monitor         Weakness     therapy         Closed displaced comminuted fracture of shaft of right humerus with routine healing      RUE sling   Therapy   Pain management         COPD (chronic obstructive pulmonary disease) (CMS/HCC)     Stable   no SOB, cough, wheeze   monitor         Hypertension, essential     staple   losartan   monitor         Atrial fibrillation (CMS/HCC) - Primary      Stable   no SOB palpitations dizziness   Eliquis   bleeding precautions   monitor          Medications, treatments, and labs reviewed  Continue medications and treatments as listed in EMR    MARK Bradley-CNP

## 2023-08-29 NOTE — PROGRESS NOTES
PROGRESS NOTE    Subjective   Chief complaint: Ermelinda Benoit is a 89 y.o. female who is a acute skilled care patient being seen and evaluated for weakness.    HPI:  7/14/23  Patient was admitted to skilled nursing facility. Patient was admitted due to displaced comminuted fracture of shaft of humerus right arm. Patient also has a past medical history of COPD, cerebral infarction and weakness.     7/18/23   Patient recently admitted to SNF for therapy d/t weakness after recent hospitalization for right humerus fracture.   Patient requires assist with ADLs and transfers.  Therapy is working with patient to increase strength and improve functional mobility.  Patient is ambulating 20 feet with LBQC and minimal/contact-guard assist.  Requires minimal assist for transfers and moderate/max assist for ADLs.  Patient is stable with no new concerns.  Pain from recent fracture is controlled with current medications.  No new concerns reported by staff.      Objective   Vital signs: 152/72  Physical Exam  Constitutional:       General: She is not in acute distress.  Eyes:      Extraocular Movements: Extraocular movements intact.   Pulmonary:      Effort: Pulmonary effort is normal.   Musculoskeletal:      Cervical back: Neck supple.   Neurological:      Mental Status: She is alert.   Psychiatric:         Mood and Affect: Mood normal.         Behavior: Behavior is cooperative.         Assessment/Plan   Problem List Items Addressed This Visit       CHF (congestive heart failure) (CMS/[facility])     Monitor weight low sodium diet          Weakness - Primary     PT OT eval and treat         Closed displaced comminuted fracture of shaft of right humerus with routine healing      RUE sling   no swelling in hand   Therapy   pain management          Medications, treatments, and labs reviewed  Continue medications and treatments as listed in EMR    Scribe Attestation  Tayler MURILLO Scribe   attest that this documentation has been  prepared under the direction and in the presence of BLAS Wang    Provider Attestation - Scribe documentation  All medical record entries made by the Scribe were at my direction and personally dictated by me. I have reviewed the chart and agree that the record accurately reflects my personal performance of the history, physical exam, discussion and plan.   BLAS Wang

## 2023-09-04 ENCOUNTER — NURSING HOME VISIT (OUTPATIENT)
Dept: POST ACUTE CARE | Facility: EXTERNAL LOCATION | Age: 88
End: 2023-09-04
Payer: MEDICARE

## 2023-09-04 DIAGNOSIS — J44.9 CHRONIC OBSTRUCTIVE PULMONARY DISEASE, UNSPECIFIED COPD TYPE (MULTI): ICD-10-CM

## 2023-09-04 DIAGNOSIS — I50.9 CONGESTIVE HEART FAILURE, UNSPECIFIED HF CHRONICITY, UNSPECIFIED HEART FAILURE TYPE (MULTI): ICD-10-CM

## 2023-09-04 DIAGNOSIS — I48.20 CHRONIC ATRIAL FIBRILLATION (MULTI): Primary | ICD-10-CM

## 2023-09-04 PROCEDURE — 99349 HOME/RES VST EST MOD MDM 40: CPT

## 2023-09-04 NOTE — LETTER
Patient: Ermelinda Benoit  : 3/14/1933    Encounter Date: 2023    PROGRESS NOTE    Subjective  Chief complaint: Ermelinda Benoit is a 89 y.o. female who is an assisted living facility patient being seen and evaluated for  general medical care and monthly follow-up    HPI:  Patient seen and evaluated for general medical care monthly follow-up.  Patient at baseline mentation, cooperative, pleasant.  Patient offers no complaints at time.  Patient requires assistance with ADLs and uses wheelchair for distance.   Continues to work with therapy for ambulation. A-fib stable- no SOB, palpitations, chest pain.  No concerns per nursing      Objective  Vital signs: 142/63-63-16    Physical Exam  Constitutional:       Appearance: Normal appearance.   HENT:      Head: Normocephalic.      Mouth/Throat:      Mouth: Mucous membranes are moist.   Eyes:      Extraocular Movements: Extraocular movements intact.   Cardiovascular:      Rate and Rhythm: Normal rate. Rhythm irregular.      Pulses: Normal pulses.      Heart sounds: Normal heart sounds.   Pulmonary:      Effort: Pulmonary effort is normal.      Breath sounds: Normal breath sounds.   Abdominal:      General: Bowel sounds are normal.      Palpations: Abdomen is soft.   Musculoskeletal:         General: Normal range of motion.      Cervical back: Normal range of motion and neck supple.      Comments:  generalized weakness   Skin:     General: Skin is warm and dry.      Capillary Refill: Capillary refill takes less than 2 seconds.   Neurological:      Mental Status: She is alert. Mental status is at baseline.   Psychiatric:         Behavior: Behavior normal.         Assessment/Plan  Problem List Items Addressed This Visit       CHF (congestive heart failure) (CMS/[facility])      Stable   no SOB, Rales   BLE edema +1   Torsemide   monitor         COPD (chronic obstructive pulmonary disease) (CMS/[facility])     Stable   no SOB, cough, wheeze   monitor         Atrial fibrillation  (CMS/[facility]) - Primary      Stable   no SOB palpitations dizziness   Eliquis   bleeding precautions   monitor          Medications, treatments, and labs reviewed  Continue medications and treatments as listed in EMR    BLAS Bradley      Electronically Signed By: BLAS Bradley   9/27/23  5:53 PM

## 2023-09-05 NOTE — PROGRESS NOTES
PROGRESS NOTE    Subjective   Chief complaint: Ermelinda Benoit is a 89 y.o. female who is an acute skilled patient being seen and evaluated for weakness    HPI:  7/20/23  patient  admitted to SNF for PT OT S/P fall.  Patient has comminuted fracture right humerus.  PMH COPD CVA right foot ulcer HTN A-fib HF AS HDL diverticulosis cholelithiasis.   Patient at baseline mentation, cooperative, pleasant Patient working with PT on gait training and transfers, and with ST for speech.  Appetite good. Sleeping well.  Denies F/C/N/V/D, cough, SOB, chest pain.  RUE in sling.    7/25/23 Patient has been working in therapy to improve strength, endurance, and ADLs.  Patient continues to work toward goals.  No new concerns today.  Denies n/v/f/c pain.      7/26/23 Therapy has been working with the patient to improve strength and endurance with ADLs, transfers, and mobility.  Patient continues to work toward goals.  Patient is stable and has no new complaints.  Nursing staff voices no new concerns today.      Objective   Vital signs: 126/62,65,95% RA    Physical Exam  Constitutional:       General: She is not in acute distress.  Eyes:      Extraocular Movements: Extraocular movements intact.   Pulmonary:      Effort: Pulmonary effort is normal.   Musculoskeletal:      Cervical back: Neck supple.   Neurological:      Mental Status: She is alert.   Psychiatric:         Mood and Affect: Mood normal.         Behavior: Behavior is cooperative.         Assessment/Plan   Problem List Items Addressed This Visit       CHF (congestive heart failure) (CMS/[facility])     Monitor weight low sodium diet          Weakness - Primary     PT OT eval and treat         Closed displaced comminuted fracture of shaft of right humerus with routine healing      RUE sling   no swelling in hand   Therapy   pain management         COPD (chronic obstructive pulmonary disease) (CMS/[facility])     Stable monitor           Medications, treatments, and labs  reviewed  Continue medications and treatments as listed in EMR    Scribe Attestation  I, Nancy Santamraia   attest that this documentation has been prepared under the direction and in the presence of BLAS Wang.     Provider Attestation - Scribe documentation  All medical record entries made by the Scribe were at my direction and personally dictated by me. I have reviewed the chart and agree that the record accurately reflects my personal performance of the history, physical exam, discussion and plan.   BLAS Wang

## 2023-09-05 NOTE — PROGRESS NOTES
PROGRESS NOTE    Subjective   Chief complaint: Ermelinda Benoit is a 89 y.o. female who is an acute skilled patient being seen and evaluated for weakness    HPI:  7/14/23  Patient was admitted to skilled nursing facility. Patient was admitted due to displaced comminuted fracture of shaft of humerus right arm. Patient also has a past medical history of COPD, cerebral infarction and weakness.     7/18/23   Patient recently admitted to SNF for therapy d/t weakness after recent hospitalization for right humerus fracture.   Patient requires assist with ADLs and transfers.  Therapy is working with patient to increase strength and improve functional mobility.  Patient is ambulating 20 feet with LBQC and minimal/contact-guard assist.  Requires minimal assist for transfers and moderate/max assist for ADLs.  Patient is stable with no new concerns.  Pain from recent fracture is controlled with current medications.  No new concerns reported by staff.    7/19/23 Patient working with therapy to reach goals. Patient completing multiple therapeutic exercises to increase strength, mobility and flexibility.   Patient actively participates in skilled interventions. No new nursing concerns, denies n,v,f,c,pain.         Objective   Vital signs: 147/70,71,95% RA    Physical Exam  Constitutional:       General: She is not in acute distress.  Eyes:      Extraocular Movements: Extraocular movements intact.   Pulmonary:      Effort: Pulmonary effort is normal.   Musculoskeletal:      Cervical back: Neck supple.   Neurological:      Mental Status: She is alert.   Psychiatric:         Mood and Affect: Mood normal.         Behavior: Behavior is cooperative.         Assessment/Plan   Problem List Items Addressed This Visit       CHF (congestive heart failure) (CMS/[facility])     Monitor weight low sodium diet          Weakness - Primary     PT OT eval and treat         Closed displaced comminuted fracture of shaft of right humerus with routine  healing      RUE sling   no swelling in hand   Therapy   pain management         COPD (chronic obstructive pulmonary disease) (CMS/[facility])     Stable monitor           Medications, treatments, and labs reviewed  Continue medications and treatments as listed in EMR    Scribe Attestation  I, Nancy Santamaria   attest that this documentation has been prepared under the direction and in the presence of BLAS Wang.     Provider Attestation - Scribe documentation  All medical record entries made by the Scribe were at my direction and personally dictated by me. I have reviewed the chart and agree that the record accurately reflects my personal performance of the history, physical exam, discussion and plan.   KAYLIN WangCNP

## 2023-09-11 NOTE — PROGRESS NOTES
PROGRESS NOTE    Subjective   Chief complaint: Ermelinda Benoit is a 89 y.o. female who is an acute skilled patient being seen and evaluated for weakness    HPI:  Patient seen for general medical care.  A-fib is rate controlled.. No palpitations.  COPD is stable. No wheezing.  HTN is controlled. BP at goal for age.    7/20/23 Patient  admitted to SNF for PT OT S/P fall.  Patient has comminuted fracture right humerus.  PMH COPD CVA right foot ulcer HTN A-fib HF AS HDL diverticulosis cholelithiasis.   Patient at baseline mentation, cooperative, pleasant Patient working with PT on gait training and transfers, and with ST for speech.  Appetite good. Sleeping well.  Denies F/C/N/V/D, cough, SOB, chest pain.  RUE in sling.    8/1/23 Patient sitting up at bedside. States working in therapy and is now sore  in R shoulder. No injury or overuse. States sleeping well and appetite is good. RUE remains in sling. Offers no c/o f/c/n/v/d cough sob chest pain.    8/2/23  Patient continues to work with therapy for transfers gait training and mobility.  States feeling better today.  Continues to wear his sling.  MSPs intact.  Patient offers no complaints at time.  No concerns per nursing.    8/8/23 Therapy has been working with the patient to improve strength and endurance with ADLs, transfers, and mobility.  Patient continues to work toward goals.  Patient is stable and has no new complaints.  Nursing staff voices no new concerns today.      Objective   Vital signs: 147/68,62,97% RA    Physical Exam  Constitutional:       General: She is not in acute distress.  Eyes:      Extraocular Movements: Extraocular movements intact.   Pulmonary:      Effort: Pulmonary effort is normal.   Musculoskeletal:      Cervical back: Neck supple.   Neurological:      Mental Status: She is alert.   Psychiatric:         Mood and Affect: Mood normal.         Behavior: Behavior is cooperative.         Assessment/Plan   Problem List Items Addressed This  Visit       CHF (congestive heart failure) (CMS/[facility])     Monitor weight low sodium diet          Weakness     PT OT eval and treat         Closed displaced comminuted fracture of shaft of right humerus with routine healing      RUE sling   no swelling in hand   Therapy   pain management         Atrial fibrillation (CMS/[facility]) - Primary     Stable   no SOB palpitations dizziness   Eliquis   bleeding precautions   monitor          Medications, treatments, and labs reviewed  Continue medications and treatments as listed in EMR    Scribe Attestation  I, Nancy Santamaria   attest that this documentation has been prepared under the direction and in the presence of BLAS Wang.     Provider Attestation - Scribe documentation  All medical record entries made by the Scribe were at my direction and personally dictated by me. I have reviewed the chart and agree that the record accurately reflects my personal performance of the history, physical exam, discussion and plan.   BLAS Wang

## 2023-09-11 NOTE — PROGRESS NOTES
PROGRESS NOTE    Subjective   Chief complaint: Ermelinda Benoit is a 89 y.o. female who is an acute skilled patient being seen and evaluated for weakness    HPI:  Patient seen for general medical care.  A-fib is rate controlled.. No palpitations.  COPD is stable. No wheezing.  HTN is controlled. BP at goal for age.    7/20/23 Patient  admitted to SNF for PT OT S/P fall.  Patient has comminuted fracture right humerus.  PMH COPD CVA right foot ulcer HTN A-fib HF AS HDL diverticulosis cholelithiasis.   Patient at baseline mentation, cooperative, pleasant Patient working with PT on gait training and transfers, and with ST for speech.  Appetite good. Sleeping well.  Denies F/C/N/V/D, cough, SOB, chest pain.  RUE in sling.    8/1/23 Patient sitting up at bedside. States working in therapy and is now sore  in R shoulder. No injury or overuse. States sleeping well and appetite is good. RUE remains in sling. Offers no c/o f/c/n/v/d cough sob chest pain.    8/2/23  Patient continues to work with therapy for transfers gait training and mobility.  States feeling better today.  Continues to wear his sling.  MSPs intact.  Patient offers no complaints at time.  No concerns per nursing.    8/9/23 Patient continues working with therapy to reach goals. Patient ambulating 100' with CGA. Patient is SBA for transfers and ADLs. No new nursing concerns, denies n,v,f,c,pain.         Objective   Vital signs: 138/60,60.96% RA    Physical Exam  Constitutional:       General: She is not in acute distress.  Eyes:      Extraocular Movements: Extraocular movements intact.   Pulmonary:      Effort: Pulmonary effort is normal.   Musculoskeletal:      Cervical back: Neck supple.   Neurological:      Mental Status: She is alert.   Psychiatric:         Mood and Affect: Mood normal.         Behavior: Behavior is cooperative.         Assessment/Plan   Problem List Items Addressed This Visit       CHF (congestive heart failure) (CMS/[facility])     Monitor  weight low sodium diet          Weakness - Primary     PT OT eval and treat         Closed displaced comminuted fracture of shaft of right humerus with routine healing      RUE sling   no swelling in hand   Therapy   pain management         Hypertension, essential     staple   losartan   monitor          Medications, treatments, and labs reviewed  Continue medications and treatments as listed in EMR    Scribe Attestation  Aylin MURILLO Scribe   attest that this documentation has been prepared under the direction and in the presence of BLAS Wang.     Provider Attestation - Scribe documentation  All medical record entries made by the Scribe were at my direction and personally dictated by me. I have reviewed the chart and agree that the record accurately reflects my personal performance of the history, physical exam, discussion and plan.   KAYLIN WangCNP

## 2023-09-12 NOTE — PROGRESS NOTES
PROGRESS NOTE    Subjective   Chief complaint: Ermelinda Benoit is a 89 y.o. female who is an acute skilled patient being seen and evaluated for weakness    HPI:  Patient seen for general medical care.  A-fib is rate controlled.. No palpitations.  COPD is stable. No wheezing.  HTN is controlled. BP at goal for age.    7/20/23 Patient  admitted to SNF for PT OT S/P fall.  Patient has comminuted fracture right humerus.  PMH COPD CVA right foot ulcer HTN A-fib HF AS HDL diverticulosis cholelithiasis.   Patient at baseline mentation, cooperative, pleasant Patient working with PT on gait training and transfers, and with ST for speech.  Appetite good. Sleeping well.  Denies F/C/N/V/D, cough, SOB, chest pain.  RUE in sling.    8/1/23 Patient sitting up at bedside. States working in therapy and is now sore  in R shoulder. No injury or overuse. States sleeping well and appetite is good. RUE remains in sling. Offers no c/o f/c/n/v/d cough sob chest pain.    8/2/23  Patient continues to work with therapy for transfers gait training and mobility.  States feeling better today.  Continues to wear his sling.  MSPs intact.  Patient offers no complaints at time.  No concerns per nursing.    8/9/23 Patient continues working with therapy to reach goals. Patient ambulating 100' with CGA. Patient is SBA for transfers and ADLs. No new nursing concerns, denies n,v,f,c,pain.     8/15/23 Therapy has been working with the patient to improve strength and endurance with ADLs, transfers, and mobility.  Patient continues to work toward goals.  Patient is stable and has no new complaints.  Nursing staff voices no new concerns today.      Objective   Vital signs: 110/55, 97.9, 84, 98%    Physical Exam  Constitutional:       General: She is not in acute distress.  Eyes:      Extraocular Movements: Extraocular movements intact.   Pulmonary:      Effort: Pulmonary effort is normal.   Musculoskeletal:      Cervical back: Neck supple.   Neurological:       Mental Status: She is alert.   Psychiatric:         Mood and Affect: Mood normal.         Behavior: Behavior is cooperative.         Assessment/Plan   Problem List Items Addressed This Visit       CHF (congestive heart failure) (CMS/[facility])     Monitor weight low sodium diet          Weakness - Primary     PT OT eval and treat         COPD (chronic obstructive pulmonary disease) (CMS/[facility])     Stable monitor          Hypertension, essential     Medications, treatments, and labs reviewed  Continue medications and treatments as listed in PCC    Scribe Attestation  IKrissy Scribe   attest that this documentation has been prepared under the direction and in the presence of BLAS Wang.    Provider Attestation - Scribe documentation  All medical record entries made by the Scribe were at my direction and personally dictated by me. I have reviewed the chart and agree that the record accurately reflects my personal performance of the history, physical exam, discussion and plan.    KAYLIN WangCNP

## 2023-09-18 NOTE — PROGRESS NOTES
PROGRESS NOTE    Subjective   Chief complaint: Ermelinda Benoit is a 89 y.o. female who is an acute skilled patient being seen and evaluated for weakness    HPI:  Patient seen for general medical care.  A-fib is rate controlled.. No palpitations.  COPD is stable. No wheezing.  HTN is controlled. BP at goal for age.    7/20/23 Patient  admitted to SNF for PT OT S/P fall.  Patient has comminuted fracture right humerus.  PMH COPD CVA right foot ulcer HTN A-fib HF AS HDL diverticulosis cholelithiasis.   Patient at baseline mentation, cooperative, pleasant Patient working with PT on gait training and transfers, and with ST for speech.  Appetite good. Sleeping well.  Denies F/C/N/V/D, cough, SOB, chest pain.  RUE in sling.    8/1/23 Patient sitting up at bedside. States working in therapy and is now sore  in R shoulder. No injury or overuse. States sleeping well and appetite is good. RUE remains in sling. Offers no c/o f/c/n/v/d cough sob chest pain.    8/2/23  Patient continues to work with therapy for transfers gait training and mobility.  States feeling better today.  Continues to wear his sling.  MSPs intact.  Patient offers no complaints at time.  No concerns per nursing.    8/10/23Patient states doing well today.  Sitting up at bedside wearing sling RUE.  MSPs intact.  Continues to work with therapy for gait training and transfers.  Patient offers no complaints at time.  Pain managed.  HTN and COPD stable.  No concerns per nursing    8/17/23 Patient states doing well today-offers no complaints.  Continues to work in therapy for  balance, strengthening, mobility, and ADLs.  RUE remains in sling.  Pain managed.  HTN stable- no headache, dizziness   A-fib stable-  no SOB, chest pain. No concerns per nursing.    8/22/23 Patient presents for f/u therapy and general medical care.  Patient seen and examined at beside.  Therapy has been working with the patient to improve strength, endurance, ADLs, and transfers d/t  generalized weakness.  Patient has no acute concerns today.        Objective   Vital signs: 148/54, 98.8, 67, 98%    Physical Exam  Constitutional:       General: She is not in acute distress.  Eyes:      Extraocular Movements: Extraocular movements intact.   Pulmonary:      Effort: Pulmonary effort is normal.   Musculoskeletal:      Cervical back: Neck supple.   Neurological:      Mental Status: She is alert.   Psychiatric:         Mood and Affect: Mood normal.         Behavior: Behavior is cooperative.         Assessment/Plan   Problem List Items Addressed This Visit       CHF (congestive heart failure) (CMS/[facility])     Monitor weight low sodium diet          Weakness - Primary     PT OT eval and treat         Closed displaced comminuted fracture of shaft of right humerus with routine healing      RUE sling   no swelling in hand   Therapy   pain management         COPD (chronic obstructive pulmonary disease) (CMS/[facility])     Stable monitor           Medications, treatments, and labs reviewed  Continue medications and treatments as listed in [facility]    Scribe Attestation  Krissy MURILLO Scribe   attest that this documentation has been prepared under the direction and in the presence of BLAS Wang.    Provider Attestation - Scribe documentation  All medical record entries made by the Scribe were at my direction and personally dictated by me. I have reviewed the chart and agree that the record accurately reflects my personal performance of the history, physical exam, discussion and plan.    BLAS Wang

## 2023-09-21 NOTE — PROGRESS NOTES
PROGRESS NOTE    Subjective   Chief complaint: Ermelinda Benoit is a 89 y.o. female who is an acute skilled patient being seen and evaluated for weakness    HPI:  Patient seen for general medical care.  A-fib is rate controlled.. No palpitations.  COPD is stable. No wheezing.  HTN is controlled. BP at goal for age.    7/20/23 Patient  admitted to SNF for PT OT S/P fall.  Patient has comminuted fracture right humerus.  PMH COPD CVA right foot ulcer HTN A-fib HF AS HDL diverticulosis cholelithiasis.   Patient at baseline mentation, cooperative, pleasant Patient working with PT on gait training and transfers, and with ST for speech.  Appetite good. Sleeping well.  Denies F/C/N/V/D, cough, SOB, chest pain.  RUE in sling.    8/1/23 Patient sitting up at bedside. States working in therapy and is now sore  in R shoulder. No injury or overuse. States sleeping well and appetite is good. RUE remains in sling. Offers no c/o f/c/n/v/d cough sob chest pain.    8/2/23  Patient continues to work with therapy for transfers gait training and mobility.  States feeling better today.  Continues to wear his sling.  MSPs intact.  Patient offers no complaints at time.  No concerns per nursing.    8/9/23 Patient continues working with therapy to reach goals. Patient ambulating 100' with CGA. Patient is SBA for transfers and ADLs. No new nursing concerns, denies n,v,f,c,pain.     8/15/23 Therapy has been working with the patient to improve strength and endurance with ADLs, transfers, and mobility.  Patient continues to work toward goals.  Patient is stable and has no new complaints.  Nursing staff voices no new concerns today.    8/16/23 Patient seen and examined at bedside.  Patient denies n/v/f/c.  Continues working in therapy.  No new complaints.      Objective   Vital signs: 140/70, 97.2, 64, 96%    Physical Exam  Constitutional:       General: She is not in acute distress.  Eyes:      Extraocular Movements: Extraocular movements  intact.   Pulmonary:      Effort: Pulmonary effort is normal.   Musculoskeletal:      Cervical back: Neck supple.   Neurological:      Mental Status: She is alert.   Psychiatric:         Mood and Affect: Mood normal.         Behavior: Behavior is cooperative.         Assessment/Plan   Problem List Items Addressed This Visit       CHF (congestive heart failure) (CMS/[facility])     Monitor weight low sodium diet          Weakness - Primary     PT OT eval and treat         Closed displaced comminuted fracture of shaft of right humerus with routine healing      RUE sling   no swelling in hand   Therapy   pain management         COPD (chronic obstructive pulmonary disease) (CMS/[facility])     Stable monitor           Medications, treatments, and labs reviewed  Continue medications and treatments as listed in [facility]    Scribe Attestation  Krissy MURILLO Scribe   attest that this documentation has been prepared under the direction and in the presence of BLAS Wang.    Provider Attestation - Scribe documentation  All medical record entries made by the Scribe were at my direction and personally dictated by me. I have reviewed the chart and agree that the record accurately reflects my personal performance of the history, physical exam, discussion and plan.    BLAS Wang

## 2023-09-21 NOTE — PROGRESS NOTES
PROGRESS NOTE    Subjective   Chief complaint: Ermelinda Benoit is a 89 y.o. female who is an acute skilled patient being seen and evaluated for weakness    HPI:  Patient seen for general medical care.  A-fib is rate controlled.. No palpitations.  COPD is stable. No wheezing.  HTN is controlled. BP at goal for age.    7/20/23 Patient  admitted to SNF for PT OT S/P fall.  Patient has comminuted fracture right humerus.  PMH COPD CVA right foot ulcer HTN A-fib HF AS HDL diverticulosis cholelithiasis.   Patient at baseline mentation, cooperative, pleasant Patient working with PT on gait training and transfers, and with ST for speech.  Appetite good. Sleeping well.  Denies F/C/N/V/D, cough, SOB, chest pain.  RUE in sling.    8/1/23 Patient sitting up at bedside. States working in therapy and is now sore  in R shoulder. No injury or overuse. States sleeping well and appetite is good. RUE remains in sling. Offers no c/o f/c/n/v/d cough sob chest pain.    8/2/23  Patient continues to work with therapy for transfers gait training and mobility.  States feeling better today.  Continues to wear his sling.  MSPs intact.  Patient offers no complaints at time.  No concerns per nursing.    8/10/23Patient states doing well today.  Sitting up at bedside wearing sling RUE.  MSPs intact.  Continues to work with therapy for gait training and transfers.  Patient offers no complaints at time.  Pain managed.  HTN and COPD stable.  No concerns per nursing    8/11/23 Therapy has been working with the patient to improve strength and endurance with ADLs, transfers, and mobility.  Patient continues to work toward goals.  Patient is stable and has no new complaints.  Nursing staff voices no new concerns today.      Objective   Vital signs: 162/64, 97.8, 69, 96%    Physical Exam  Constitutional:       General: She is not in acute distress.  Eyes:      Extraocular Movements: Extraocular movements intact.   Pulmonary:      Effort: Pulmonary  effort is normal.   Musculoskeletal:      Cervical back: Neck supple.   Neurological:      Mental Status: She is alert.   Psychiatric:         Mood and Affect: Mood normal.         Behavior: Behavior is cooperative.         Assessment/Plan   Problem List Items Addressed This Visit       CHF (congestive heart failure) (CMS/[facility])     Monitor weight low sodium diet          Weakness - Primary     PT OT eval and treat         Closed displaced comminuted fracture of shaft of right humerus with routine healing      RUE sling   no swelling in hand   Therapy   pain management         COPD (chronic obstructive pulmonary disease) (CMS/[facility])     Stable monitor           Medications, treatments, and labs reviewed  Continue medications and treatments as listed in [facility]    Scribe Attestation  I, Nancy Us   attest that this documentation has been prepared under the direction and in the presence of BLAS Wang.    Provider Attestation - Scribe documentation  All medical record entries made by the Scribe were at my direction and personally dictated by me. I have reviewed the chart and agree that the record accurately reflects my personal performance of the history, physical exam, discussion and plan.    BLAS Wang

## 2023-09-22 NOTE — PROGRESS NOTES
PROGRESS NOTE    Subjective   Chief complaint: Ermelinda Benoit is a 89 y.o. female who is an acute skilled patient being seen and evaluated for weakness    HPI:  Patient seen for general medical care.  A-fib is rate controlled.. No palpitations.  COPD is stable. No wheezing.  HTN is controlled. BP at goal for age.    7/20/23 Patient  admitted to SNF for PT OT S/P fall.  Patient has comminuted fracture right humerus.  PMH COPD CVA right foot ulcer HTN A-fib HF AS HDL diverticulosis cholelithiasis.   Patient at baseline mentation, cooperative, pleasant Patient working with PT on gait training and transfers, and with ST for speech.  Appetite good. Sleeping well.  Denies F/C/N/V/D, cough, SOB, chest pain.  RUE in sling.    8/1/23 Patient sitting up at bedside. States working in therapy and is now sore  in R shoulder. No injury or overuse. States sleeping well and appetite is good. RUE remains in sling. Offers no c/o f/c/n/v/d cough sob chest pain.    8/2/23  Patient continues to work with therapy for transfers gait training and mobility.  States feeling better today.  Continues to wear his sling.  MSPs intact.  Patient offers no complaints at time.  No concerns per nursing.    8/10/23Patient states doing well today.  Sitting up at bedside wearing sling RUE.  MSPs intact.  Continues to work with therapy for gait training and transfers.  Patient offers no complaints at time.  Pain managed.  HTN and COPD stable.  No concerns per nursing    8/17/23 Patient states doing well today-offers no complaints.  Continues to work in therapy for  balance, strengthening, mobility, and ADLs.  RUE remains in sling.  Pain managed.  HTN stable- no headache, dizziness   A-fib stable-  no SOB, chest pain. No concerns per nursing.    8/22/23 Patient presents for f/u therapy and general medical care.  Patient seen and examined at beside.  Therapy has been working with the patient to improve strength, endurance, ADLs, and transfers d/t  generalized weakness.  Patient has no acute concerns today.    8/23/23 Patient has been working in therapy to improve strength, endurance, and ADLs.  Patient continues to work toward goals.  No new concerns today.  Denies n/v/f/c pain.            Objective   Vital signs: 166/93, 97.3, 54, 95%    Physical Exam  Constitutional:       General: She is not in acute distress.  Eyes:      Extraocular Movements: Extraocular movements intact.   Pulmonary:      Effort: Pulmonary effort is normal.   Musculoskeletal:      Cervical back: Neck supple.   Neurological:      Mental Status: She is alert.   Psychiatric:         Mood and Affect: Mood normal.         Behavior: Behavior is cooperative.         Assessment/Plan   Problem List Items Addressed This Visit       Weakness - Primary     PT OT eval and treat         Closed displaced comminuted fracture of shaft of right humerus with routine healing      RUE sling   no swelling in hand   Therapy   pain management         COPD (chronic obstructive pulmonary disease) (CMS/[facility])     Stable monitor           Medications, treatments, and labs reviewed  Continue medications and treatments as listed in [facility]    Scribe Attestation  IKrissy Scribe   attest that this documentation has been prepared under the direction and in the presence of BLAS Wang.    Provider Attestation - Scribe documentation  All medical record entries made by the Scribe were at my direction and personally dictated by me. I have reviewed the chart and agree that the record accurately reflects my personal performance of the history, physical exam, discussion and plan.    BLAS Wang

## 2023-09-22 NOTE — PROGRESS NOTES
PROGRESS NOTE    Subjective   Chief complaint: Ermelinda Benoit is a 89 y.o. female who is an acute skilled patient being seen and evaluated for weakness    HPI:  Patient seen for general medical care.  A-fib is rate controlled.. No palpitations.  COPD is stable. No wheezing.  HTN is controlled. BP at goal for age.    7/20/23 Patient  admitted to SNF for PT OT S/P fall.  Patient has comminuted fracture right humerus.  PMH COPD CVA right foot ulcer HTN A-fib HF AS HDL diverticulosis cholelithiasis.   Patient at baseline mentation, cooperative, pleasant Patient working with PT on gait training and transfers, and with ST for speech.  Appetite good. Sleeping well.  Denies F/C/N/V/D, cough, SOB, chest pain.  RUE in sling.    8/1/23 Patient sitting up at bedside. States working in therapy and is now sore  in R shoulder. No injury or overuse. States sleeping well and appetite is good. RUE remains in sling. Offers no c/o f/c/n/v/d cough sob chest pain.    8/2/23  Patient continues to work with therapy for transfers gait training and mobility.  States feeling better today.  Continues to wear his sling.  MSPs intact.  Patient offers no complaints at time.  No concerns per nursing.    8/10/23Patient states doing well today.  Sitting up at bedside wearing sling RUE.  MSPs intact.  Continues to work with therapy for gait training and transfers.  Patient offers no complaints at time.  Pain managed.  HTN and COPD stable.  No concerns per nursing    8/17/23 Patient states doing well today-offers no complaints.  Continues to work in therapy for  balance, strengthening, mobility, and ADLs.  RUE remains in sling.  Pain managed.  HTN stable- no headache, dizziness   A-fib stable-  no SOB, chest pain. No concerns per nursing.    8/18/23 Patient in therapy d/t generalized weakness.  Patient presents for f/u.  Continues to work toward goals in therapy.  No new complaints at this time.      Objective   Vital signs: 146/60     Physical  Exam  Constitutional:       General: She is not in acute distress.  Eyes:      Extraocular Movements: Extraocular movements intact.   Pulmonary:      Effort: Pulmonary effort is normal.   Musculoskeletal:      Cervical back: Neck supple.   Neurological:      Mental Status: She is alert.   Psychiatric:         Mood and Affect: Mood normal.         Behavior: Behavior is cooperative.         Assessment/Plan   Problem List Items Addressed This Visit       Weakness - Primary     PT OT eval and treat         Closed displaced comminuted fracture of shaft of right humerus with routine healing      RUE sling   no swelling in hand   Therapy   pain management         COPD (chronic obstructive pulmonary disease) (CMS/[facility])     Stable monitor           Medications, treatments, and labs reviewed  Continue medications and treatments as listed in [facility]    Scribe Attestation  I, Nancy Us   attest that this documentation has been prepared under the direction and in the presence of BLAS Wang.    Provider Attestation - Scribe documentation  All medical record entries made by the Scribe were at my direction and personally dictated by me. I have reviewed the chart and agree that the record accurately reflects my personal performance of the history, physical exam, discussion and plan.    BLAS Wang

## 2023-09-25 NOTE — PROGRESS NOTES
PROGRESS NOTE    Subjective   Chief complaint: Ermelinda Benoit is a 89 y.o. female who is an acute skilled patient being seen and evaluated for weakness    HPI:  Patient seen for general medical care.  A-fib is rate controlled.. No palpitations.  COPD is stable. No wheezing.  HTN is controlled. BP at goal for age.    7/20/23 Patient  admitted to SNF for PT OT S/P fall.  Patient has comminuted fracture right humerus.  PMH COPD CVA right foot ulcer HTN A-fib HF AS HDL diverticulosis cholelithiasis.   Patient at baseline mentation, cooperative, pleasant Patient working with PT on gait training and transfers, and with ST for speech.  Appetite good. Sleeping well.  Denies F/C/N/V/D, cough, SOB, chest pain.  RUE in sling.    8/1/23 Patient sitting up at bedside. States working in therapy and is now sore  in R shoulder. No injury or overuse. States sleeping well and appetite is good. RUE remains in sling. Offers no c/o f/c/n/v/d cough sob chest pain.    8/2/23  Patient continues to work with therapy for transfers gait training and mobility.  States feeling better today.  Continues to wear his sling.  MSPs intact.  Patient offers no complaints at time.  No concerns per nursing.    8/10/23Patient states doing well today.  Sitting up at bedside wearing sling RUE.  MSPs intact.  Continues to work with therapy for gait training and transfers.  Patient offers no complaints at time.  Pain managed.  HTN and COPD stable.  No concerns per nursing    8/17/23 Patient states doing well today-offers no complaints.  Continues to work in therapy for  balance, strengthening, mobility, and ADLs.  RUE remains in sling.  Pain managed.  HTN stable- no headache, dizziness   A-fib stable-  no SOB, chest pain. No concerns per nursing.    8/22/23 Patient presents for f/u therapy and general medical care.  Patient seen and examined at beside.  Therapy has been working with the patient to improve strength, endurance, ADLs, and transfers d/t  generalized weakness.  Patient has no acute concerns today.    8/23/23 Patient has been working in therapy to improve strength, endurance, and ADLs.  Patient continues to work toward goals.  No new concerns today.  Denies n/v/f/c pain.      8/25/23   Therapy has been working with the patient to improve strength and endurance with ADLs, transfers, and mobility.  Patient continues to work toward goals.  Patient is stable and has no new complaints.  Nursing staff voices no new concerns today.        Objective   Vital signs: 153/74, 97.4,73, 93%    Physical Exam  Constitutional:       General: She is not in acute distress.  Eyes:      Extraocular Movements: Extraocular movements intact.   Pulmonary:      Effort: Pulmonary effort is normal.   Musculoskeletal:      Cervical back: Neck supple.   Neurological:      Mental Status: She is alert.   Psychiatric:         Mood and Affect: Mood normal.         Behavior: Behavior is cooperative.         Assessment/Plan   Problem List Items Addressed This Visit       CHF (congestive heart failure) (CMS/[facility]) - Primary     Monitor weight low sodium diet          Closed displaced comminuted fracture of shaft of right humerus with routine healing      RUE sling   no swelling in hand   Therapy   pain management         COPD (chronic obstructive pulmonary disease) (CMS/[facility])     Stable monitor          Hypertension, essential     staple   losartan   monitor          Medications, treatments, and labs reviewed  Continue medications and treatments as listed in [facility]    Scribe Attestation  I, Nancy Us   attest that this documentation has been prepared under the direction and in the presence of BLAS Wang.    Provider Attestation - Scribe documentation  All medical record entries made by the Scribe were at my direction and personally dictated by me. I have reviewed the chart and agree that the record accurately reflects my personal performance of the history,  physical exam, discussion and plan.    Mckay Ritchie, APRN-CNP

## 2023-09-27 NOTE — PROGRESS NOTES
PROGRESS NOTE    Subjective   Chief complaint: Ermelinda Benoit is a 89 y.o. female who is an assisted living facility patient being seen and evaluated for  general medical care and monthly follow-up    HPI:  Patient seen and evaluated for general medical care monthly follow-up.  Patient at baseline mentation, cooperative, pleasant.  Patient offers no complaints at time.  Patient requires assistance with ADLs and uses wheelchair for distance.   Continues to work with therapy for ambulation. A-fib stable- no SOB, palpitations, chest pain.  No concerns per nursing      Objective   Vital signs: 142/63-63-16    Physical Exam  Constitutional:       Appearance: Normal appearance.   HENT:      Head: Normocephalic.      Mouth/Throat:      Mouth: Mucous membranes are moist.   Eyes:      Extraocular Movements: Extraocular movements intact.   Cardiovascular:      Rate and Rhythm: Normal rate. Rhythm irregular.      Pulses: Normal pulses.      Heart sounds: Normal heart sounds.   Pulmonary:      Effort: Pulmonary effort is normal.      Breath sounds: Normal breath sounds.   Abdominal:      General: Bowel sounds are normal.      Palpations: Abdomen is soft.   Musculoskeletal:         General: Normal range of motion.      Cervical back: Normal range of motion and neck supple.      Comments:  generalized weakness   Skin:     General: Skin is warm and dry.      Capillary Refill: Capillary refill takes less than 2 seconds.   Neurological:      Mental Status: She is alert. Mental status is at baseline.   Psychiatric:         Behavior: Behavior normal.         Assessment/Plan   Problem List Items Addressed This Visit       CHF (congestive heart failure) (CMS/HCC)      Stable   no SOB, Rales   BLE edema +1   Torsemide   monitor         COPD (chronic obstructive pulmonary disease) (CMS/[facility])     Stable   no SOB, cough, wheeze   monitor         Atrial fibrillation (CMS/[facility]) - Primary      Stable   no SOB palpitations dizziness    Eliquis   bleeding precautions   monitor          Medications, treatments, and labs reviewed  Continue medications and treatments as listed in EMR    Ashlee Nicholas, APRN-CNP

## 2023-09-27 NOTE — ASSESSMENT & PLAN NOTE
ANDRIY pardo   no swelling in hand   Therapy   pain management  
PT OT eval and treat  
Stable monitor   
Age appropriate behavior

## 2023-09-28 NOTE — PROGRESS NOTES
PROGRESS NOTE    Subjective   Chief complaint: Ermelinda Benoit is a 89 y.o. female who is an acute skilled patient being seen and evaluated for weakness    HPI:  7/14/23  Patient was admitted to skilled nursing facility. Patient was admitted due to displaced comminuted fracture of shaft of humerus right arm. Patient also has a past medical history of COPD, cerebral infarction and weakness.     7/18/23   Patient recently admitted to SNF for therapy d/t weakness after recent hospitalization for right humerus fracture.   Patient requires assist with ADLs and transfers.  Therapy is working with patient to increase strength and improve functional mobility.  Patient is ambulating 20 feet with LBQC and minimal/contact-guard assist.  Requires minimal assist for transfers and moderate/max assist for ADLs.  Patient is stable with no new concerns.  Pain from recent fracture is controlled with current medications.  No new concerns reported by staff.    7/19/23 Patient working with therapy to reach goals. Patient completing multiple therapeutic exercises to increase strength, mobility and flexibility.   Patient actively participates in skilled interventions. No new nursing concerns, denies n,v,f,c,pain.     7/20/23   Patient seen and examined at bedside.  Patient denies n/v/f/c.  Continues working in therapy.  No new complaints.          Objective   Vital signs: 141/71, 97.5, 78, 98%    Physical Exam  Constitutional:       General: She is not in acute distress.  Eyes:      Extraocular Movements: Extraocular movements intact.   Pulmonary:      Effort: Pulmonary effort is normal.   Musculoskeletal:      Cervical back: Neck supple.   Neurological:      Mental Status: She is alert.   Psychiatric:         Mood and Affect: Mood normal.         Behavior: Behavior is cooperative.         Assessment/Plan   Problem List Items Addressed This Visit       CHF (congestive heart failure) (CMS/[facility])    Weakness    COPD (chronic obstructive  pulmonary disease) (CMS/HCC)    Hypertension, essential    Atrial fibrillation (CMS/HCC) - Primary     Medications, treatments, and labs reviewed  Continue medications and treatments as listed in [facility]    Scribe Attestation  I, Nancy Us   attest that this documentation has been prepared under the direction and in the presence of Mckay Arita DO.    Provider Attestation - Scribe documentation  All medical record entries made by the Scribe were at my direction and personally dictated by me. I have reviewed the chart and agree that the record accurately reflects my personal performance of the history, physical exam, discussion and plan.    Mckay Arita DO

## 2023-10-03 ENCOUNTER — NURSING HOME VISIT (OUTPATIENT)
Dept: POST ACUTE CARE | Facility: EXTERNAL LOCATION | Age: 88
End: 2023-10-03
Payer: MEDICARE

## 2023-10-03 DIAGNOSIS — I48.91 CONTROLLED ATRIAL FIBRILLATION (MULTI): ICD-10-CM

## 2023-10-03 DIAGNOSIS — I50.9 CONGESTIVE HEART FAILURE, UNSPECIFIED HF CHRONICITY, UNSPECIFIED HEART FAILURE TYPE (MULTI): Primary | ICD-10-CM

## 2023-10-03 DIAGNOSIS — J44.9 CHRONIC OBSTRUCTIVE PULMONARY DISEASE, UNSPECIFIED COPD TYPE (MULTI): ICD-10-CM

## 2023-10-03 PROCEDURE — 99349 HOME/RES VST EST MOD MDM 40: CPT

## 2023-10-03 NOTE — LETTER
Patient: Ermelinda Benoit  : 3/14/1933    Encounter Date: 10/03/2023    PROGRESS NOTE    Subjective  Chief complaint: Ermelinda Benoit is a 89 y.o. female who is an assisted living facility patient being seen and evaluated for  general medical care monthly follow-up    HPI:  Patient seen and evaluated for general medical care.  Patient offers no complaints today.  Continues to require assistance with ADLs and mobility.  A-fib stable- no SOB, dizziness, palpitations.  CHF stable- no edema, Rales, chest pain.  Appetite stable.  No concerns per nursing      Objective  Vital signs:  157/94-68-18    Physical Exam  Constitutional:       Appearance: Normal appearance.   HENT:      Head: Normocephalic.      Mouth/Throat:      Mouth: Mucous membranes are moist.   Eyes:      Extraocular Movements: Extraocular movements intact.   Cardiovascular:      Rate and Rhythm: Normal rate. Rhythm irregular.      Pulses: Normal pulses.      Heart sounds: Normal heart sounds.   Pulmonary:      Effort: Pulmonary effort is normal.      Breath sounds: Normal breath sounds.   Abdominal:      General: Bowel sounds are normal.   Musculoskeletal:         General: Normal range of motion.      Cervical back: Normal range of motion and neck supple.      Comments:  generalized weakness   Skin:     General: Skin is warm and dry.      Capillary Refill: Capillary refill takes less than 2 seconds.   Neurological:      Mental Status: She is alert. Mental status is at baseline.   Psychiatric:         Mood and Affect: Mood normal.         Behavior: Behavior normal.         Assessment/Plan  Problem List Items Addressed This Visit       CHF (congestive heart failure) (CMS/[facility]) - Primary      Stable   no SOB, Rales   BLE edema +1   Torsemide   monitor         COPD (chronic obstructive pulmonary disease) (CMS/[facility])     Stable   no SOB, cough, wheeze   monitor         Controlled atrial fibrillation (CMS/[facility])      Stable   no SOB, dizziness, palpitations    rate controlled   Eliquis   bleeding precautions   monitor            Medications, treatments, and labs reviewed  Continue medications and treatments as listed in EMR    BLAS Bradley      Electronically Signed By: BLAS Bradley   10/31/23  9:07 PM

## 2023-10-16 DIAGNOSIS — I48.91 ATRIAL FIBRILLATION, UNSPECIFIED TYPE (MULTI): Primary | ICD-10-CM

## 2023-10-25 PROBLEM — J11.00 INFLUENZA WITH PNEUMONIA: Status: ACTIVE | Noted: 2023-10-25

## 2023-10-25 PROBLEM — I73.89 OTHER SPECIFIED PERIPHERAL VASCULAR DISEASES (CMS-HCC): Status: ACTIVE | Noted: 2023-10-25

## 2023-10-25 PROBLEM — R06.00 DYSPNEA: Status: ACTIVE | Noted: 2023-10-25

## 2023-10-25 PROBLEM — Z86.73 HISTORY OF CEREBROVASCULAR ACCIDENT: Status: ACTIVE | Noted: 2023-10-25

## 2023-10-25 PROBLEM — R55 VASOVAGAL SYNCOPE: Status: ACTIVE | Noted: 2023-04-22

## 2023-10-25 PROBLEM — I48.11 LONGSTANDING PERSISTENT ATRIAL FIBRILLATION (MULTI): Status: ACTIVE | Noted: 2023-04-23

## 2023-10-25 PROBLEM — I50.32 CHRONIC DIASTOLIC CONGESTIVE HEART FAILURE (MULTI): Status: ACTIVE | Noted: 2023-04-23

## 2023-10-25 PROBLEM — Z85.3 HISTORY OF MALIGNANT NEOPLASM OF BREAST: Status: ACTIVE | Noted: 2023-04-23

## 2023-10-25 PROBLEM — K92.1 MELENA: Status: ACTIVE | Noted: 2023-04-29

## 2023-10-25 PROBLEM — E78.5 DYSLIPIDEMIA: Status: ACTIVE | Noted: 2023-10-25

## 2023-10-25 PROBLEM — I48.91 CONTROLLED ATRIAL FIBRILLATION (MULTI): Status: ACTIVE | Noted: 2023-10-25

## 2023-10-25 PROBLEM — I44.2 ATRIOVENTRICULAR BLOCK, COMPLETE (MULTI): Status: ACTIVE | Noted: 2023-04-29

## 2023-10-25 PROBLEM — I11.9 HYPERTENSION WITH HEART DISEASE: Status: ACTIVE | Noted: 2023-10-25

## 2023-10-25 PROBLEM — R42 VERTIGO: Status: ACTIVE | Noted: 2023-10-25

## 2023-10-25 PROBLEM — R04.0 EPISTAXIS: Status: ACTIVE | Noted: 2023-10-25

## 2023-10-25 PROBLEM — R42 DIZZINESS: Status: ACTIVE | Noted: 2023-10-25

## 2023-10-25 PROBLEM — Z95.2 S/P TAVR (TRANSCATHETER AORTIC VALVE REPLACEMENT): Status: ACTIVE | Noted: 2023-10-25

## 2023-10-25 PROBLEM — I48.21 PERMANENT ATRIAL FIBRILLATION (MULTI): Status: ACTIVE | Noted: 2023-04-29

## 2023-10-25 PROBLEM — Z79.01 ON CONTINUOUS ORAL ANTICOAGULATION: Status: ACTIVE | Noted: 2023-10-25

## 2023-10-25 PROBLEM — Z95.2 HISTORY OF AORTIC VALVE REPLACEMENT: Status: ACTIVE | Noted: 2023-04-22

## 2023-10-25 PROBLEM — R26.2 AMBULATORY DYSFUNCTION: Status: ACTIVE | Noted: 2023-10-25

## 2023-10-25 PROBLEM — I63.432 CEREBROVASCULAR ACCIDENT (CVA) DUE TO EMBOLISM OF LEFT POSTERIOR CEREBRAL ARTERY (MULTI): Status: ACTIVE | Noted: 2023-10-25

## 2023-10-25 PROBLEM — T82.897A AORTIC PROSTHETIC VALVE REGURGITATION: Status: ACTIVE | Noted: 2023-10-25

## 2023-10-25 PROBLEM — S42.209A PROXIMAL HUMERUS FRACTURE: Status: ACTIVE | Noted: 2023-10-25

## 2023-10-25 PROBLEM — D64.9 ANEMIA: Status: ACTIVE | Noted: 2023-04-29

## 2023-10-25 PROBLEM — Z90.11 ABSENCE OF RIGHT BREAST: Status: ACTIVE | Noted: 2023-04-23

## 2023-10-25 PROBLEM — Z96.641 PRESENCE OF ARTIFICIAL HIP JOINT, RIGHT: Status: ACTIVE | Noted: 2023-04-23

## 2023-10-25 RX ORDER — ACETAMINOPHEN 500 MG
2000 TABLET ORAL DAILY
COMMUNITY

## 2023-10-25 RX ORDER — ALBUTEROL SULFATE 90 UG/1
1 AEROSOL, METERED RESPIRATORY (INHALATION)
COMMUNITY
Start: 2023-05-11 | End: 2024-05-15 | Stop reason: WASHOUT

## 2023-10-25 RX ORDER — AMOXICILLIN 250 MG
1 CAPSULE ORAL DAILY
COMMUNITY
Start: 2023-07-08

## 2023-10-25 RX ORDER — ACETAMINOPHEN 500 MG
500 TABLET ORAL EVERY 4 HOURS PRN
COMMUNITY
Start: 2022-09-15

## 2023-10-25 RX ORDER — ASPIRIN 81 MG/1
81 TABLET ORAL DAILY
COMMUNITY
Start: 2018-01-09 | End: 2023-10-26 | Stop reason: ALTCHOICE

## 2023-10-25 RX ORDER — WARFARIN 2.5 MG/1
2.5 TABLET ORAL EVERY OTHER DAY
COMMUNITY
Start: 2016-11-03 | End: 2023-10-26 | Stop reason: ALTCHOICE

## 2023-10-25 RX ORDER — MECLIZINE HCL 12.5 MG 12.5 MG/1
12.5 TABLET ORAL
COMMUNITY
Start: 2020-01-20 | End: 2023-10-26 | Stop reason: ALTCHOICE

## 2023-10-25 RX ORDER — TORSEMIDE 20 MG/1
10 TABLET ORAL DAILY
COMMUNITY
Start: 2018-05-21

## 2023-10-25 RX ORDER — POTASSIUM CHLORIDE 750 MG/1
10 TABLET, EXTENDED RELEASE ORAL DAILY
COMMUNITY
Start: 2018-03-01

## 2023-10-25 RX ORDER — TALC
3 POWDER (GRAM) TOPICAL NIGHTLY
COMMUNITY
Start: 2022-09-15

## 2023-10-25 RX ORDER — LOSARTAN POTASSIUM 100 MG/1
100 TABLET ORAL DAILY
COMMUNITY
Start: 2018-03-01 | End: 2024-01-11 | Stop reason: SDUPTHER

## 2023-10-25 RX ORDER — ROSUVASTATIN CALCIUM 5 MG/1
1 TABLET, COATED ORAL DAILY
COMMUNITY

## 2023-10-25 RX ORDER — POLYETHYLENE GLYCOL 3350 17 G/17G
17 POWDER, FOR SOLUTION ORAL ONCE
COMMUNITY
Start: 2023-07-13 | End: 2023-10-26 | Stop reason: ALTCHOICE

## 2023-10-25 RX ORDER — ATORVASTATIN CALCIUM 10 MG/1
10 TABLET, FILM COATED ORAL
COMMUNITY
End: 2023-10-26 | Stop reason: ALTCHOICE

## 2023-10-26 ENCOUNTER — OFFICE VISIT (OUTPATIENT)
Dept: CARDIOLOGY | Facility: CLINIC | Age: 88
End: 2023-10-26
Payer: MEDICARE

## 2023-10-26 VITALS
OXYGEN SATURATION: 96 % | BODY MASS INDEX: 21.71 KG/M2 | SYSTOLIC BLOOD PRESSURE: 150 MMHG | HEIGHT: 62 IN | DIASTOLIC BLOOD PRESSURE: 70 MMHG | WEIGHT: 118 LBS | HEART RATE: 73 BPM

## 2023-10-26 DIAGNOSIS — Z86.73 HISTORY OF CEREBROVASCULAR ACCIDENT: ICD-10-CM

## 2023-10-26 DIAGNOSIS — Z95.2 HISTORY OF AORTIC VALVE REPLACEMENT: ICD-10-CM

## 2023-10-26 DIAGNOSIS — T82.897D AORTIC PROSTHETIC VALVE REGURGITATION, SUBSEQUENT ENCOUNTER: ICD-10-CM

## 2023-10-26 DIAGNOSIS — Z95.2 S/P TAVR (TRANSCATHETER AORTIC VALVE REPLACEMENT): Primary | ICD-10-CM

## 2023-10-26 DIAGNOSIS — I10 HYPERTENSION, ESSENTIAL: ICD-10-CM

## 2023-10-26 DIAGNOSIS — I50.32 CHRONIC DIASTOLIC CONGESTIVE HEART FAILURE (MULTI): ICD-10-CM

## 2023-10-26 DIAGNOSIS — E78.5 DYSLIPIDEMIA: ICD-10-CM

## 2023-10-26 DIAGNOSIS — R42 DIZZINESS: ICD-10-CM

## 2023-10-26 DIAGNOSIS — I48.11 LONGSTANDING PERSISTENT ATRIAL FIBRILLATION (MULTI): ICD-10-CM

## 2023-10-26 DIAGNOSIS — Z79.01 ON CONTINUOUS ORAL ANTICOAGULATION: ICD-10-CM

## 2023-10-26 DIAGNOSIS — I48.21 PERMANENT ATRIAL FIBRILLATION (MULTI): ICD-10-CM

## 2023-10-26 PROCEDURE — 1159F MED LIST DOCD IN RCRD: CPT | Performed by: STUDENT IN AN ORGANIZED HEALTH CARE EDUCATION/TRAINING PROGRAM

## 2023-10-26 PROCEDURE — 99213 OFFICE O/P EST LOW 20 MIN: CPT | Performed by: STUDENT IN AN ORGANIZED HEALTH CARE EDUCATION/TRAINING PROGRAM

## 2023-10-26 PROCEDURE — 3077F SYST BP >= 140 MM HG: CPT | Performed by: STUDENT IN AN ORGANIZED HEALTH CARE EDUCATION/TRAINING PROGRAM

## 2023-10-26 PROCEDURE — 1126F AMNT PAIN NOTED NONE PRSNT: CPT | Performed by: STUDENT IN AN ORGANIZED HEALTH CARE EDUCATION/TRAINING PROGRAM

## 2023-10-26 PROCEDURE — 3078F DIAST BP <80 MM HG: CPT | Performed by: STUDENT IN AN ORGANIZED HEALTH CARE EDUCATION/TRAINING PROGRAM

## 2023-10-26 PROCEDURE — 1036F TOBACCO NON-USER: CPT | Performed by: STUDENT IN AN ORGANIZED HEALTH CARE EDUCATION/TRAINING PROGRAM

## 2023-10-26 ASSESSMENT — PAIN SCALES - GENERAL: PAINLEVEL: 0-NO PAIN

## 2023-10-26 NOTE — PROGRESS NOTES
"Chief Complaint:   6 month f/u     History Of Present Illness:    Ermelinda Benoit is a 89 y.o. female returns to clinic for a follow-up appointment.  She unfortunately had a fall over the summer resulting in injury to her shoulder.  She required rehab for over 7 weeks but has not steadily improving.  She denies chest pain or shortness of breath.  She is using compression hoses which has substantially improved the lower extremity edema.  She does continue to complain of dizziness which is worse with standing.  Blood pressure today is 150/70 heart rate of 83 satting 95% room air.  Weight 218 pounds.  Patient is currently on torsemide 10 mg daily.     Last Recorded Vitals:  Vitals:    10/26/23 1527   BP: 150/70   BP Location: Left arm   Patient Position: Sitting   BP Cuff Size: Adult   Pulse: 73   SpO2: 96%   Weight: 53.5 kg (118 lb)   Height: 1.575 m (5' 2\")       Review of Systems  ROS      Allergies:  Codeine, Iodinated contrast media, Macrolide antibiotics, Moxifloxacin, Penicillins, Adhesive, Erythromycin base, Nitrofurantoin, Propoxyphene, Tetracyclines, Erythromycin, and Sulfa (sulfonamide antibiotics)    Outpatient Medications:  Current Outpatient Medications   Medication Instructions    acetaminophen (TYLENOL) 500 mg, oral, Every 4 hours PRN    albuterol 90 mcg/actuation inhaler 1 puff, inhalation, 3 times daily RT    apixaban (ELIQUIS) 2.5 mg, oral, 2 times daily    cholecalciferol (VITAMIN D-3) 2,000 Units, oral, Daily    losartan (COZAAR) 100 mg, oral, Daily    melatonin 3 mg, oral, Nightly    potassium chloride CR 10 mEq ER tablet 10 mEq, oral, Daily    rosuvastatin (Crestor) 5 mg tablet 1 tablet, oral, Daily    sennosides-docusate sodium (Martha-Colace) 8.6-50 mg tablet 1 tablet, oral, Daily    torsemide (DEMADEX) 20 mg, oral, Daily       Physical Exam:  General: No acute distress, frail, in a wheelchair, daughter is present A&O x3  Skin: Warm and dry  Neck: JVD is not elevated  ENT: Moist mucous " membranes no lesions appreciated  Pulmonary: Diminished but otherwise clear to auscultation bilaterally  Cards: Regular rate rhythm, no murmurs gallops or rubs appreciated normal S1-S2  Abdomen: Soft nontender nondistended  Extremities: No significant edema noted today  Psych: Appropriate mood and affect     Last Labs:  CBC -  Lab Results   Component Value Date    WBC 7.2 07/08/2023    HGB 9.5 (L) 07/08/2023    HCT 31.4 (L) 07/08/2023    MCV 85 07/08/2023     07/08/2023       CMP -  Lab Results   Component Value Date    CALCIUM 9.4 07/08/2023    PHOS 3.7 03/01/2018    PROT 6.7 04/26/2023    ALBUMIN 4.1 04/26/2023    AST 18 04/26/2023    ALT 12 04/26/2023    ALKPHOS 75 04/26/2023    BILITOT 1.1 04/26/2023       LIPID PANEL -   Lab Results   Component Value Date    CHOL 176 04/11/2019    HDL 57.6 04/11/2019    CHHDL 3.1 04/11/2019       RENAL FUNCTION PANEL -   Lab Results   Component Value Date    GLUCOSE 89 07/08/2023     07/08/2023    K 4.2 07/08/2023     07/08/2023    CO2 27 07/08/2023    ANIONGAP 13 07/08/2023    BUN 26 (H) 07/08/2023    CREATININE 0.82 07/08/2023    CALCIUM 9.4 07/08/2023    PHOS 3.7 03/01/2018    ALBUMIN 4.1 04/26/2023        Lab Results   Component Value Date     (H) 04/26/2023       Assessment and Plan    1. Severe aortic stenosis status post TAVR with a [employer] CoreValve evolute 26 mm. Postprocedure echocardiogram shows mild perivalvular aortic regurgitation.   -Remains on Eliquis for A-fib which will continue.  No need for aspirin.  -Endocarditis prophylaxis where appropriate.    2. Heart failure preserved ejection fraction: EF of 60% with moderate elevated RVSP.  Prior hospitalization for decompensated heart failure and UTI.  Continue torsemide 10 mg daily.  Recheck BMP and BMP.     3. Permanent atrial fibrillation: Slow ventricular rate. [id number] score of 7. Anticoagulated with Eliquis. Previously on Coumadin. Not on beta-blockers due to bradycardia.  Check  CBC rule out worsening anemia.     4. Hypertension: We have allowed for permissive hypertension given patient's chronic history of dizziness likely related to CVA events. Aim to maintain blood pressure less than 150/90.     5. Dyslipidemia: On Crestor 5 mg.      6. Dizziness: Chronic appears to be multifactorial secondary to prior stroke, postural dizziness and possibly some chronic intracranial vascular insufficiency. Her symptoms did not improve after correction of her aortic valve stenosis. We will allow for permissive hypertension as mentioned above.      Ej Hoffman MD PhD

## 2023-11-01 NOTE — ASSESSMENT & PLAN NOTE
Stable   no SOB, dizziness, palpitations   rate controlled   Eliquis   bleeding precautions   monitor

## 2023-11-01 NOTE — PROGRESS NOTES
PROGRESS NOTE    Subjective   Chief complaint: Ermelinda Benoit is a 89 y.o. female who is an assisted living facility patient being seen and evaluated for  general medical care monthly follow-up    HPI:  Patient seen and evaluated for general medical care.  Patient offers no complaints today.  Continues to require assistance with ADLs and mobility.  A-fib stable- no SOB, dizziness, palpitations.  CHF stable- no edema, Rales, chest pain.  Appetite stable.  No concerns per nursing      Objective   Vital signs:  157/94-68-18    Physical Exam  Constitutional:       Appearance: Normal appearance.   HENT:      Head: Normocephalic.      Mouth/Throat:      Mouth: Mucous membranes are moist.   Eyes:      Extraocular Movements: Extraocular movements intact.   Cardiovascular:      Rate and Rhythm: Normal rate. Rhythm irregular.      Pulses: Normal pulses.      Heart sounds: Normal heart sounds.   Pulmonary:      Effort: Pulmonary effort is normal.      Breath sounds: Normal breath sounds.   Abdominal:      General: Bowel sounds are normal.   Musculoskeletal:         General: Normal range of motion.      Cervical back: Normal range of motion and neck supple.      Comments:  generalized weakness   Skin:     General: Skin is warm and dry.      Capillary Refill: Capillary refill takes less than 2 seconds.   Neurological:      Mental Status: She is alert. Mental status is at baseline.   Psychiatric:         Mood and Affect: Mood normal.         Behavior: Behavior normal.         Assessment/Plan   Problem List Items Addressed This Visit       CHF (congestive heart failure) (CMS/[facility]) - Primary      Stable   no SOB, Rales   BLE edema +1   Torsemide   monitor         COPD (chronic obstructive pulmonary disease) (CMS/[facility])     Stable   no SOB, cough, wheeze   monitor         Controlled atrial fibrillation (CMS/[facility])      Stable   no SOB, dizziness, palpitations   rate controlled   Eliquis   bleeding precautions   monitor             Medications, treatments, and labs reviewed  Continue medications and treatments as listed in EMR    Ashlee Nicholas, APRN-CNP

## 2023-11-07 ENCOUNTER — NURSING HOME VISIT (OUTPATIENT)
Dept: POST ACUTE CARE | Facility: EXTERNAL LOCATION | Age: 88
End: 2023-11-07
Payer: MEDICARE

## 2023-11-07 DIAGNOSIS — J44.9 CHRONIC OBSTRUCTIVE PULMONARY DISEASE, UNSPECIFIED COPD TYPE (MULTI): ICD-10-CM

## 2023-11-07 DIAGNOSIS — I48.91 CONTROLLED ATRIAL FIBRILLATION (MULTI): ICD-10-CM

## 2023-11-07 DIAGNOSIS — R53.1 WEAKNESS: ICD-10-CM

## 2023-11-07 DIAGNOSIS — I50.9 CONGESTIVE HEART FAILURE, UNSPECIFIED HF CHRONICITY, UNSPECIFIED HEART FAILURE TYPE (MULTI): Primary | ICD-10-CM

## 2023-11-07 PROCEDURE — 99349 HOME/RES VST EST MOD MDM 40: CPT

## 2023-11-07 NOTE — LETTER
Patient: Ermelinda Benoit  : 3/14/1933    Encounter Date: 2023    PROGRESS NOTE    Subjective  Chief complaint: Ermelinda Benoit is a 89 y.o. female who is an assisted living facility patient being seen and evaluated for  general medical care and monthly follow-up    HPI:  Patient seen and evaluated for general medical care and monthly follow-up.  Patient at baseline mentation, converses easily.  Offers no complaints at time.  Gait steady with walker.  Uses wheelchair for distance.  CHF stable- no SOB, Rales, edema.  BP within goal.  Appetite stable.  Sleeping well.  No concerns per nursing.      Objective  Vital signs:  116/63-97.9-57-18-98%    Physical Exam  Constitutional:       Appearance: Normal appearance.   HENT:      Head: Normocephalic.      Mouth/Throat:      Mouth: Mucous membranes are moist.   Eyes:      Extraocular Movements: Extraocular movements intact.   Cardiovascular:      Rate and Rhythm: Normal rate and regular rhythm.      Pulses: Normal pulses.      Heart sounds: Murmur heard.   Pulmonary:      Effort: Pulmonary effort is normal.      Breath sounds: Normal breath sounds.   Abdominal:      General: Bowel sounds are normal.      Palpations: Abdomen is soft.   Musculoskeletal:         General: Normal range of motion.      Cervical back: Normal range of motion and neck supple.      Comments:   Generalized weakness   Skin:     General: Skin is warm and dry.      Capillary Refill: Capillary refill takes less than 2 seconds.   Neurological:      Mental Status: She is alert. Mental status is at baseline.   Psychiatric:         Mood and Affect: Mood normal.         Behavior: Behavior normal.         Assessment/Plan  Problem List Items Addressed This Visit       CHF (congestive heart failure) (CMS/[facility]) - Primary      Stable   no SOB, Rales, edema   Torsemide   monitor         Weakness      walker for ambulation   wheelchair for distance   Reinforced calling for assistance when needed          COPD (chronic obstructive pulmonary disease) (CMS/[facility])     Stable   no SOB, cough, wheeze   monitor         Controlled atrial fibrillation (CMS/[facility])      Stable   no SOB, dizziness, palpitations   rate controlled   Eliquis   bleeding precautions   monitor            Medications, treatments, and labs reviewed  Continue medications and treatments as listed in EMR    BLAS Bradley      Electronically Signed By: BLAS Bradley   11/29/23  7:41 AM

## 2023-11-29 NOTE — PROGRESS NOTES
PROGRESS NOTE    Subjective   Chief complaint: Ermelinda Benoit is a 89 y.o. female who is an assisted living facility patient being seen and evaluated for  general medical care and monthly follow-up    HPI:  Patient seen and evaluated for general medical care and monthly follow-up.  Patient at baseline mentation, converses easily.  Offers no complaints at time.  Gait steady with walker.  Uses wheelchair for distance.  CHF stable- no SOB, Rales, edema.  BP within goal.  Appetite stable.  Sleeping well.  No concerns per nursing.      Objective   Vital signs:  116/63-97.9-57-18-98%    Physical Exam  Constitutional:       Appearance: Normal appearance.   HENT:      Head: Normocephalic.      Mouth/Throat:      Mouth: Mucous membranes are moist.   Eyes:      Extraocular Movements: Extraocular movements intact.   Cardiovascular:      Rate and Rhythm: Normal rate and regular rhythm.      Pulses: Normal pulses.      Heart sounds: Murmur heard.   Pulmonary:      Effort: Pulmonary effort is normal.      Breath sounds: Normal breath sounds.   Abdominal:      General: Bowel sounds are normal.      Palpations: Abdomen is soft.   Musculoskeletal:         General: Normal range of motion.      Cervical back: Normal range of motion and neck supple.      Comments:   Generalized weakness   Skin:     General: Skin is warm and dry.      Capillary Refill: Capillary refill takes less than 2 seconds.   Neurological:      Mental Status: She is alert. Mental status is at baseline.   Psychiatric:         Mood and Affect: Mood normal.         Behavior: Behavior normal.         Assessment/Plan   Problem List Items Addressed This Visit       CHF (congestive heart failure) (CMS/[facility]) - Primary      Stable   no SOB, Rales, edema   Torsemide   monitor         Weakness      walker for ambulation   wheelchair for distance   Reinforced calling for assistance when needed         COPD (chronic obstructive pulmonary disease) (CMS/[facility])     Stable   no  SOB, cough, wheeze   monitor         Controlled atrial fibrillation (CMS/[facility])      Stable   no SOB, dizziness, palpitations   rate controlled   Eliquis   bleeding precautions   monitor            Medications, treatments, and labs reviewed  Continue medications and treatments as listed in EMR    Ashlee Nicholas, APRN-CNP

## 2023-12-05 ENCOUNTER — NURSING HOME VISIT (OUTPATIENT)
Dept: POST ACUTE CARE | Facility: EXTERNAL LOCATION | Age: 88
End: 2023-12-05
Payer: MEDICARE

## 2023-12-05 DIAGNOSIS — R60.0 LEG EDEMA: ICD-10-CM

## 2023-12-05 DIAGNOSIS — I50.9 CONGESTIVE HEART FAILURE, UNSPECIFIED HF CHRONICITY, UNSPECIFIED HEART FAILURE TYPE (MULTI): ICD-10-CM

## 2023-12-05 DIAGNOSIS — I48.11 LONGSTANDING PERSISTENT ATRIAL FIBRILLATION (MULTI): Primary | ICD-10-CM

## 2023-12-05 DIAGNOSIS — I10 HYPERTENSION, ESSENTIAL: ICD-10-CM

## 2023-12-05 PROCEDURE — 99349 HOME/RES VST EST MOD MDM 40: CPT

## 2023-12-05 NOTE — LETTER
Patient: Ermelinda Benoit  : 3/14/1933    Encounter Date: 2023    PROGRESS NOTE    Subjective  Chief complaint: Ermelinda Benoit is a 89 y.o. female who is an assisted living facility patient being seen and evaluated for general medical care and monthly follow-up    HPI:  Patient seen and evaluated for general medical care monthly follow-up.  Patient converses easily.  Offers no complaints at time.  A-fib stable- no SOB, palpitations, chest pain.  Trace peripheral edema- reinforced elevation and compression hose.  No concerns per nursing      Objective  Vital signs: 132/67-97.7-71-16-96%    Physical Exam  Constitutional:       Appearance: Normal appearance.   HENT:      Head: Normocephalic.      Mouth/Throat:      Mouth: Mucous membranes are moist.   Eyes:      Extraocular Movements: Extraocular movements intact.   Cardiovascular:      Rate and Rhythm: Normal rate. Rhythm irregular.      Pulses: Normal pulses.      Heart sounds: Murmur heard.   Pulmonary:      Effort: Pulmonary effort is normal.      Breath sounds: Normal breath sounds.   Abdominal:      General: Bowel sounds are normal.      Palpations: Abdomen is soft.   Musculoskeletal:         General: Normal range of motion.      Cervical back: Normal range of motion and neck supple.      Right lower leg: Edema present.      Left lower leg: Edema present.   Skin:     General: Skin is warm and dry.      Capillary Refill: Capillary refill takes less than 2 seconds.   Neurological:      Mental Status: She is alert. Mental status is at baseline.   Psychiatric:         Mood and Affect: Mood normal.         Behavior: Behavior normal.         Assessment/Plan  Problem List Items Addressed This Visit       CHF (congestive heart failure) (CMS/HCC)      Stable   no SOB, Rales   trace edema   Torsemide   monitor weight   monitor         Leg edema      Stable   elevate compression hose   Torsemide   monitor         Hypertension, essential      Stable   BP within  goal   losartan   monitor         Longstanding persistent atrial fibrillation (CMS/HCC) - Primary      Stable    rate controlled   no SOB, palpitations, fatigue, pain   Eliquis   bleeding precautions   monitor          Medications, treatments, and labs reviewed  Continue medications and treatments as listed in EMR    BLAS Bradley      Electronically Signed By: BLAS Bradley   12/29/23  5:08 PM

## 2023-12-29 NOTE — ASSESSMENT & PLAN NOTE
Stable    rate controlled   no SOB, palpitations, fatigue, pain   Eliquis   bleeding precautions   monitor

## 2023-12-29 NOTE — PROGRESS NOTES
PROGRESS NOTE    Subjective   Chief complaint: Ermelinda Benoit is a 89 y.o. female who is an assisted living facility patient being seen and evaluated for general medical care and monthly follow-up    HPI:  Patient seen and evaluated for general medical care monthly follow-up.  Patient converses easily.  Offers no complaints at time.  A-fib stable- no SOB, palpitations, chest pain.  Trace peripheral edema- reinforced elevation and compression hose.  No concerns per nursing      Objective   Vital signs: 132/67-97.7-71-16-96%    Physical Exam  Constitutional:       Appearance: Normal appearance.   HENT:      Head: Normocephalic.      Mouth/Throat:      Mouth: Mucous membranes are moist.   Eyes:      Extraocular Movements: Extraocular movements intact.   Cardiovascular:      Rate and Rhythm: Normal rate. Rhythm irregular.      Pulses: Normal pulses.      Heart sounds: Murmur heard.   Pulmonary:      Effort: Pulmonary effort is normal.      Breath sounds: Normal breath sounds.   Abdominal:      General: Bowel sounds are normal.      Palpations: Abdomen is soft.   Musculoskeletal:         General: Normal range of motion.      Cervical back: Normal range of motion and neck supple.      Right lower leg: Edema present.      Left lower leg: Edema present.   Skin:     General: Skin is warm and dry.      Capillary Refill: Capillary refill takes less than 2 seconds.   Neurological:      Mental Status: She is alert. Mental status is at baseline.   Psychiatric:         Mood and Affect: Mood normal.         Behavior: Behavior normal.         Assessment/Plan   Problem List Items Addressed This Visit       CHF (congestive heart failure) (CMS/HCC)      Stable   no SOB, Rales   trace edema   Torsemide   monitor weight   monitor         Leg edema      Stable   elevate compression hose   Torsemide   monitor         Hypertension, essential      Stable   BP within goal   losartan   monitor         Longstanding persistent atrial  fibrillation (CMS/HCC) - Primary      Stable    rate controlled   no SOB, palpitations, fatigue, pain   Eliquis   bleeding precautions   monitor          Medications, treatments, and labs reviewed  Continue medications and treatments as listed in EMR    MARK Bradley-CNP

## 2024-01-05 ENCOUNTER — NURSING HOME VISIT (OUTPATIENT)
Dept: POST ACUTE CARE | Facility: EXTERNAL LOCATION | Age: 89
End: 2024-01-05
Payer: MEDICARE

## 2024-01-05 DIAGNOSIS — I50.9 CONGESTIVE HEART FAILURE, UNSPECIFIED HF CHRONICITY, UNSPECIFIED HEART FAILURE TYPE (MULTI): Primary | ICD-10-CM

## 2024-01-05 DIAGNOSIS — M25.551 PAIN OF RIGHT HIP: ICD-10-CM

## 2024-01-05 DIAGNOSIS — R53.1 WEAKNESS: ICD-10-CM

## 2024-01-05 DIAGNOSIS — I48.91 CONTROLLED ATRIAL FIBRILLATION (MULTI): ICD-10-CM

## 2024-01-05 DIAGNOSIS — J44.9 CHRONIC OBSTRUCTIVE PULMONARY DISEASE, UNSPECIFIED COPD TYPE (MULTI): ICD-10-CM

## 2024-01-05 DIAGNOSIS — I10 HYPERTENSION, ESSENTIAL: ICD-10-CM

## 2024-01-05 PROCEDURE — 99349 HOME/RES VST EST MOD MDM 40: CPT

## 2024-01-05 NOTE — LETTER
Patient: Ermelinda Benoit  : 3/14/1933    Encounter Date: 2024    PROGRESS NOTE    Subjective  Chief complaint: Ermelinda Benoit is a 89 y.o. female who is an assisted living facility patient being seen and evaluated for general medical care and monthly follow-up    HPI:  Patient seen and evaluated for general medical care and monthly follow-up.  Patient converses easily, offers no complaints at time.  COPD stable- no SOB, cough, wheeze.  Right hip pain managed with current therapy.  Compliant with elevation of BLE.  Appetite stable.  Sleeping well.  No concerns per nursing      Objective  Vital signs:  110/60-97.1-69-16-96%    Physical Exam  Constitutional:       Appearance: Normal appearance.   HENT:      Head: Normocephalic.      Mouth/Throat:      Mouth: Mucous membranes are moist.   Eyes:      Extraocular Movements: Extraocular movements intact.   Cardiovascular:      Rate and Rhythm: Normal rate. Rhythm irregular.      Pulses: Normal pulses.      Heart sounds: Normal heart sounds.   Pulmonary:      Effort: Pulmonary effort is normal.      Breath sounds: Normal breath sounds.   Abdominal:      General: Bowel sounds are normal.      Palpations: Abdomen is soft.   Musculoskeletal:         General: Normal range of motion.      Cervical back: Normal range of motion and neck supple.      Comments:  generalized weakness   right hip pain   Skin:     General: Skin is warm and dry.      Capillary Refill: Capillary refill takes 2 to 3 seconds.   Neurological:      Mental Status: She is alert. Mental status is at baseline.   Psychiatric:         Mood and Affect: Mood normal.         Behavior: Behavior normal.         Assessment/Plan  Problem List Items Addressed This Visit       CHF (congestive heart failure) (CMS/[facility]) - Primary      Stable   no SOB, Rales   trace edema   Torsemide   monitor weight   monitor         Weakness      walker for ambulation   wheelchair for distance   Reinforced calling for  assistance when needed         COPD (chronic obstructive pulmonary disease) (CMS/[facility])     Stable   no SOB, cough, wheeze   monitor         Hypertension, essential      Stable   BP within goal   losartan Torsemide   monitor         Controlled atrial fibrillation (CMS/[facility])      Stable   no SOB, dizziness, palpitations   rate controlled   Eliquis   bleeding precautions   monitor           Pain of right hip      chronic right hip pain   managed with Tylenol   warm compresses as needed   monitor          Medications, treatments, and labs reviewed  Continue medications and treatments as listed in EMR    BLAS Bradley      Electronically Signed By: BLAS Bradley   1/13/24 10:54 AM

## 2024-01-10 NOTE — PROGRESS NOTES
PROGRESS NOTE    Subjective   Chief complaint: Ermelinda Benoit is a 89 y.o. female patient being seen and evaluated for monthly general medical care and follow-up    HPI:  8/28/23 Patient presents for general medical care and f/u.  Patient seen and examined at bedside.  No issues per nursing.  Patient has no acute complaints.  Respiratory status is at his baseline.  Blood pressure controlled.  A-fib is rate controlled.          Objective   Vital signs: Reviewed    Physical Exam  Constitutional:       General: She is not in acute distress.  Eyes:      Extraocular Movements: Extraocular movements intact.   Pulmonary:      Effort: Pulmonary effort is normal.   Musculoskeletal:      Cervical back: Neck supple.   Neurological:      Mental Status: She is alert.   Psychiatric:         Mood and Affect: Mood normal.         Behavior: Behavior is cooperative.         Assessment/Plan   Problem List Items Addressed This Visit       CHF (congestive heart failure) (CMS/[facility])     Continue current medications         COPD (chronic obstructive pulmonary disease) (CMS/[facility])     Continue current medications         Hypertension, essential     Continue current medications         Cerebrovascular accident (CVA) due to embolism of left posterior cerebral artery (CMS/[facility]) - Primary     Continue current medications         Permanent atrial fibrillation (CMS/[facility])     Continue current medications          Medications, treatments, and labs reviewed  Continue medications and treatments as listed in EMR    Scribe Attestation  IKrissy Scribe   attest that this documentation has been prepared under the direction and in the presence of Mckay Arita DO.    Provider Attestation - Scribe documentation  All medical record entries made by the Scribe were at my direction and personally dictated by me. I have reviewed the chart and agree that the record accurately reflects my personal performance of the history, physical exam,  discussion and plan.    Mckay Arita, DO

## 2024-01-11 DIAGNOSIS — I10 HYPERTENSION, ESSENTIAL: Primary | ICD-10-CM

## 2024-01-11 RX ORDER — LOSARTAN POTASSIUM 100 MG/1
100 TABLET ORAL DAILY
Qty: 90 TABLET | Refills: 3 | Status: SHIPPED | OUTPATIENT
Start: 2024-01-11

## 2024-01-13 PROBLEM — M25.551 PAIN OF RIGHT HIP: Status: ACTIVE | Noted: 2024-01-13

## 2024-01-13 NOTE — PROGRESS NOTES
PROGRESS NOTE    Subjective   Chief complaint: Ermelinda Benoit is a 89 y.o. female who is an assisted living facility patient being seen and evaluated for general medical care and monthly follow-up    HPI:  Patient seen and evaluated for general medical care and monthly follow-up.  Patient converses easily, offers no complaints at time.  COPD stable- no SOB, cough, wheeze.  Right hip pain managed with current therapy.  Compliant with elevation of BLE.  Appetite stable.  Sleeping well.  No concerns per nursing      Objective   Vital signs:  110/60-97.1-69-16-96%    Physical Exam  Constitutional:       Appearance: Normal appearance.   HENT:      Head: Normocephalic.      Mouth/Throat:      Mouth: Mucous membranes are moist.   Eyes:      Extraocular Movements: Extraocular movements intact.   Cardiovascular:      Rate and Rhythm: Normal rate. Rhythm irregular.      Pulses: Normal pulses.      Heart sounds: Normal heart sounds.   Pulmonary:      Effort: Pulmonary effort is normal.      Breath sounds: Normal breath sounds.   Abdominal:      General: Bowel sounds are normal.      Palpations: Abdomen is soft.   Musculoskeletal:         General: Normal range of motion.      Cervical back: Normal range of motion and neck supple.      Comments:  generalized weakness   right hip pain   Skin:     General: Skin is warm and dry.      Capillary Refill: Capillary refill takes 2 to 3 seconds.   Neurological:      Mental Status: She is alert. Mental status is at baseline.   Psychiatric:         Mood and Affect: Mood normal.         Behavior: Behavior normal.         Assessment/Plan   Problem List Items Addressed This Visit       CHF (congestive heart failure) (CMS/[facility]) - Primary      Stable   no SOB, Rales   trace edema   Torsemide   monitor weight   monitor         Weakness      walker for ambulation   wheelchair for distance   Reinforced calling for assistance when needed         COPD (chronic obstructive pulmonary disease)  (CMS/[facility])     Stable   no SOB, cough, wheeze   monitor         Hypertension, essential      Stable   BP within goal   losartan Torsemide   monitor         Controlled atrial fibrillation (CMS/[facility])      Stable   no SOB, dizziness, palpitations   rate controlled   Eliquis   bleeding precautions   monitor           Pain of right hip      chronic right hip pain   managed with Tylenol   warm compresses as needed   monitor          Medications, treatments, and labs reviewed  Continue medications and treatments as listed in EMR    Ashlee Nicholas, APRN-CNP

## 2024-02-06 ENCOUNTER — NURSING HOME VISIT (OUTPATIENT)
Dept: POST ACUTE CARE | Facility: EXTERNAL LOCATION | Age: 89
End: 2024-02-06
Payer: MEDICARE

## 2024-02-06 DIAGNOSIS — I10 HYPERTENSION, ESSENTIAL: ICD-10-CM

## 2024-02-06 DIAGNOSIS — I48.11 LONGSTANDING PERSISTENT ATRIAL FIBRILLATION (MULTI): ICD-10-CM

## 2024-02-06 DIAGNOSIS — R53.1 WEAKNESS: ICD-10-CM

## 2024-02-06 DIAGNOSIS — J44.9 CHRONIC OBSTRUCTIVE PULMONARY DISEASE, UNSPECIFIED COPD TYPE (MULTI): Primary | ICD-10-CM

## 2024-02-06 PROCEDURE — 99349 HOME/RES VST EST MOD MDM 40: CPT

## 2024-02-06 NOTE — LETTER
Patient: Ermelinda Benoit  : 3/14/1933    Encounter Date: 2024    PROGRESS NOTE    Subjective  Chief complaint: Ermelinda Benoit is a 89 y.o. female who is an assisted living facility patient being seen and evaluated for  general medical care and monthly follow-up    HPI:  Patient seen and evaluated for general medical care and monthly follow-up.  States feeling better but continues to feel tired.  Discussed  improvements s/p COVID and PNA, adequate fluid and dietary intake and rest.  A-fib stable- no SOB, dizziness, palpitations.  Appetite improving.  Sleeping well.  No concerns per nursing      Objective  Vital signs: 146/83-98.2-61-20-96%    Physical Exam  Constitutional:       Appearance: Normal appearance.   HENT:      Head: Normocephalic.      Mouth/Throat:      Mouth: Mucous membranes are moist.   Eyes:      Extraocular Movements: Extraocular movements intact.   Cardiovascular:      Rate and Rhythm: Normal rate. Rhythm irregular.      Pulses: Normal pulses.      Heart sounds: Murmur heard.   Pulmonary:      Effort: Pulmonary effort is normal.      Breath sounds: Normal breath sounds.   Abdominal:      General: Bowel sounds are normal.      Palpations: Abdomen is soft.   Musculoskeletal:         General: Normal range of motion.      Cervical back: Normal range of motion and neck supple.      Comments:  generalized weakness   Skin:     General: Skin is warm and dry.      Capillary Refill: Capillary refill takes 2 to 3 seconds.   Neurological:      Mental Status: She is alert. Mental status is at baseline.   Psychiatric:         Mood and Affect: Mood normal.         Behavior: Behavior normal.         Assessment/Plan  Problem List Items Addressed This Visit       Weakness      walker for ambulation   wheelchair for distance   Reinforced calling for assistance when needed         COPD (chronic obstructive pulmonary disease) (CMS/[facility]) - Primary      Stable   no SOB, cough, wheeze   DuoNebs   monitor          Hypertension, essential      Stable   BP within goal   losartan Torsemide   monitor         Longstanding persistent atrial fibrillation (CMS/HCC)      Stable    rate controlled   no SOB, palpitations, fatigue, pain   Eliquis   bleeding precautions   monitor          Medications, treatments, and labs reviewed  Continue medications and treatments as listed in EMR    BLAS Bradley      Electronically Signed By: BLAS Bradley   2/19/24  2:01 PM

## 2024-02-13 ENCOUNTER — NURSING HOME VISIT (OUTPATIENT)
Dept: POST ACUTE CARE | Facility: EXTERNAL LOCATION | Age: 89
End: 2024-02-13
Payer: MEDICARE

## 2024-02-13 DIAGNOSIS — I48.11 LONGSTANDING PERSISTENT ATRIAL FIBRILLATION (MULTI): Primary | ICD-10-CM

## 2024-02-13 DIAGNOSIS — I50.9 CONGESTIVE HEART FAILURE, UNSPECIFIED HF CHRONICITY, UNSPECIFIED HEART FAILURE TYPE (MULTI): ICD-10-CM

## 2024-02-13 DIAGNOSIS — I11.9 HYPERTENSION WITH HEART DISEASE: ICD-10-CM

## 2024-02-13 DIAGNOSIS — J44.9 CHRONIC OBSTRUCTIVE PULMONARY DISEASE, UNSPECIFIED COPD TYPE (MULTI): ICD-10-CM

## 2024-02-13 PROCEDURE — 99349 HOME/RES VST EST MOD MDM 40: CPT

## 2024-02-13 NOTE — LETTER
Patient: Ermelinda Benoit  : 3/14/1933    Encounter Date: 2024    PROGRESS NOTE    Subjective  Chief complaint: Ermelinda Benoit is a 89 y.o. female who is an assisted living facility patient being seen and evaluated for nonproductive cough    HPI:  Patient seen and evaluated for nonproductive cough.  Offers no complaints of F/C/N/V/D, SOB, pain.  Discussed residual cough s/p COVID PNA.  COPD stable- no wheezing..  SpO2> 95%.  A-fib stable- no dizziness, palpitations.  No concerns per nursing      Objective  Vital signs:  147/83-98.2-69-22-96%    Physical Exam  Constitutional:       Appearance: Normal appearance.   HENT:      Head: Normocephalic.      Mouth/Throat:      Mouth: Mucous membranes are moist.   Eyes:      Extraocular Movements: Extraocular movements intact.   Cardiovascular:      Rate and Rhythm: Normal rate. Rhythm irregular.      Pulses: Normal pulses.      Heart sounds: Murmur heard.   Pulmonary:      Effort: Pulmonary effort is normal.      Breath sounds: Rhonchi present.   Abdominal:      General: Bowel sounds are normal.      Palpations: Abdomen is soft.   Musculoskeletal:         General: Normal range of motion.      Cervical back: Normal range of motion and neck supple.      Comments:  generalized weakness   Skin:     General: Skin is warm and dry.      Capillary Refill: Capillary refill takes 2 to 3 seconds.   Neurological:      Mental Status: She is alert. Mental status is at baseline.   Psychiatric:         Mood and Affect: Mood normal.         Behavior: Behavior normal.         Assessment/Plan  Problem List Items Addressed This Visit       CHF (congestive heart failure) (CMS/HCC)      Stable   no SOB   bibasilar crackles   trace edema   Torsemide   monitor weight   CXR   monitor         COPD (chronic obstructive pulmonary disease) (CMS/HCC)      Stable   no SOB, cough, wheeze   DuoNebs   monitor         Longstanding persistent atrial fibrillation (CMS/[facility]) - Primary      Stable     rate controlled   no SOB, palpitations, fatigue, pain   Eliquis   bleeding precautions   monitor         Hypertension with heart disease      Stable   BP within goal   Losartan   monitor          Medications, treatments, and labs reviewed  Continue medications and treatments as listed in EMR    BLAS Bradley      Electronically Signed By: BLAS Bradley   2/18/24  5:54 PM

## 2024-02-18 NOTE — PROGRESS NOTES
PROGRESS NOTE    Subjective   Chief complaint: Ermelinda Benoit is a 89 y.o. female who is an assisted living facility patient being seen and evaluated for nonproductive cough    HPI:  Patient seen and evaluated for nonproductive cough.  Offers no complaints of F/C/N/V/D, SOB, pain.  Discussed residual cough s/p COVID PNA.  COPD stable- no wheezing..  SpO2> 95%.  A-fib stable- no dizziness, palpitations.  No concerns per nursing      Objective   Vital signs:  147/83-98.2-69-22-96%    Physical Exam  Constitutional:       Appearance: Normal appearance.   HENT:      Head: Normocephalic.      Mouth/Throat:      Mouth: Mucous membranes are moist.   Eyes:      Extraocular Movements: Extraocular movements intact.   Cardiovascular:      Rate and Rhythm: Normal rate. Rhythm irregular.      Pulses: Normal pulses.      Heart sounds: Murmur heard.   Pulmonary:      Effort: Pulmonary effort is normal.      Breath sounds: Rhonchi present.   Abdominal:      General: Bowel sounds are normal.      Palpations: Abdomen is soft.   Musculoskeletal:         General: Normal range of motion.      Cervical back: Normal range of motion and neck supple.      Comments:  generalized weakness   Skin:     General: Skin is warm and dry.      Capillary Refill: Capillary refill takes 2 to 3 seconds.   Neurological:      Mental Status: She is alert. Mental status is at baseline.   Psychiatric:         Mood and Affect: Mood normal.         Behavior: Behavior normal.         Assessment/Plan   Problem List Items Addressed This Visit       CHF (congestive heart failure) (CMS/[facility])      Stable   no SOB   bibasilar crackles   trace edema   Torsemide   monitor weight   CXR   monitor         COPD (chronic obstructive pulmonary disease) (CMS/[facility])      Stable   no SOB, cough, wheeze   DuoNebs   monitor         Longstanding persistent atrial fibrillation (CMS/[facility]) - Primary      Stable    rate controlled   no SOB, palpitations, fatigue, pain   Eliquis    bleeding precautions   monitor         Hypertension with heart disease      Stable   BP within goal   Losartan   monitor          Medications, treatments, and labs reviewed  Continue medications and treatments as listed in EMR    Ashlee Nicholas, APRN-CNP

## 2024-02-19 NOTE — PROGRESS NOTES
PROGRESS NOTE    Subjective   Chief complaint: Ermelinda Benoit is a 89 y.o. female who is an assisted living facility patient being seen and evaluated for  general medical care and monthly follow-up    HPI:  Patient seen and evaluated for general medical care and monthly follow-up.  States feeling better but continues to feel tired.  Discussed  improvements s/p COVID and PNA, adequate fluid and dietary intake and rest.  A-fib stable- no SOB, dizziness, palpitations.  Appetite improving.  Sleeping well.  No concerns per nursing      Objective   Vital signs: 146/83-98.2-61-20-96%    Physical Exam  Constitutional:       Appearance: Normal appearance.   HENT:      Head: Normocephalic.      Mouth/Throat:      Mouth: Mucous membranes are moist.   Eyes:      Extraocular Movements: Extraocular movements intact.   Cardiovascular:      Rate and Rhythm: Normal rate. Rhythm irregular.      Pulses: Normal pulses.      Heart sounds: Murmur heard.   Pulmonary:      Effort: Pulmonary effort is normal.      Breath sounds: Normal breath sounds.   Abdominal:      General: Bowel sounds are normal.      Palpations: Abdomen is soft.   Musculoskeletal:         General: Normal range of motion.      Cervical back: Normal range of motion and neck supple.      Comments:  generalized weakness   Skin:     General: Skin is warm and dry.      Capillary Refill: Capillary refill takes 2 to 3 seconds.   Neurological:      Mental Status: She is alert. Mental status is at baseline.   Psychiatric:         Mood and Affect: Mood normal.         Behavior: Behavior normal.         Assessment/Plan   Problem List Items Addressed This Visit       Weakness      walker for ambulation   wheelchair for distance   Reinforced calling for assistance when needed         COPD (chronic obstructive pulmonary disease) (CMS/[facility]) - Primary      Stable   no SOB, cough, wheeze   DuoNebs   monitor         Hypertension, essential      Stable   BP within goal   losartan  Torsemide   monitor         Longstanding persistent atrial fibrillation (CMS/HCC)      Stable    rate controlled   no SOB, palpitations, fatigue, pain   Eliquis   bleeding precautions   monitor          Medications, treatments, and labs reviewed  Continue medications and treatments as listed in EMR    Ashlee Nicholas, APRN-CNP

## 2024-03-12 ENCOUNTER — NURSING HOME VISIT (OUTPATIENT)
Dept: POST ACUTE CARE | Facility: EXTERNAL LOCATION | Age: 89
End: 2024-03-12
Payer: MEDICARE

## 2024-03-12 DIAGNOSIS — J44.9 CHRONIC OBSTRUCTIVE PULMONARY DISEASE, UNSPECIFIED COPD TYPE (MULTI): ICD-10-CM

## 2024-03-12 DIAGNOSIS — I50.9 CONGESTIVE HEART FAILURE, UNSPECIFIED HF CHRONICITY, UNSPECIFIED HEART FAILURE TYPE (MULTI): Primary | ICD-10-CM

## 2024-03-12 DIAGNOSIS — R53.1 WEAKNESS: ICD-10-CM

## 2024-03-12 DIAGNOSIS — I10 HYPERTENSION, ESSENTIAL: ICD-10-CM

## 2024-03-12 DIAGNOSIS — I48.91 CONTROLLED ATRIAL FIBRILLATION (MULTI): ICD-10-CM

## 2024-03-12 PROCEDURE — 99349 HOME/RES VST EST MOD MDM 40: CPT

## 2024-03-12 NOTE — LETTER
Patient: Ermelinda Benoit  : 3/14/1933    Encounter Date: 2024    PROGRESS NOTE    Subjective  Chief complaint: Ermelinda Benoit is a 89 y.o. female who is an assisted living facility patient being seen and evaluated for  general medical care and monthly follow-up    HPI:  Patient seen and evaluated for general medical care and monthly follow-up.   Patient states feeling much better but still continues to have some weakness s/p COVID and PNA.  Appetite good.  Engaging in activities and meals with other residents.  A-fib stable- no dizziness, palpitations.  COPD stable- no SOB, wheeze.  Continues to work with therapy.  No concerns per nursing.      Objective  Vital signs:  123/67-98.3-58-16-96%    Physical Exam  Constitutional:       Appearance: Normal appearance.   HENT:      Head: Normocephalic.      Mouth/Throat:      Mouth: Mucous membranes are moist.   Eyes:      Extraocular Movements: Extraocular movements intact.   Cardiovascular:      Rate and Rhythm: Normal rate. Rhythm irregular.      Pulses: Normal pulses.      Heart sounds: Murmur heard.   Pulmonary:      Effort: Pulmonary effort is normal.      Breath sounds: Normal breath sounds.   Abdominal:      General: Bowel sounds are normal.      Palpations: Abdomen is soft.   Musculoskeletal:         General: Normal range of motion.      Cervical back: Normal range of motion and neck supple.      Right lower leg: Edema present.      Left lower leg: Edema present.      Comments:   Generalized weakness; trace edema BLE   Skin:     General: Skin is warm and dry.      Capillary Refill: Capillary refill takes less than 2 seconds.   Neurological:      Mental Status: She is alert. Mental status is at baseline.   Psychiatric:         Mood and Affect: Mood normal.         Behavior: Behavior normal.         Assessment/Plan  Problem List Items Addressed This Visit       CHF (congestive heart failure) (CMS/HCC) - Primary      Stable   no SOB, rales   trace edema    compression hose and elevation   Torsemide   monitor weight   monitor         Weakness      walker for ambulation   wheelchair for distance   Reinforced calling for assistance when needed         COPD (chronic obstructive pulmonary disease) (CMS/[facility])      Stable   no SOB, cough, wheeze   DuoNebs   monitor         Hypertension, essential      Stable   BP within goal   losartan Torsemide   monitor         Controlled atrial fibrillation (CMS/[facility])      Stable   no SOB, dizziness, palpitations   rate controlled   Eliquis   bleeding precautions   monitor            Medications, treatments, and labs reviewed  Continue medications and treatments as listed in EMR    BLAS Bradley      Electronically Signed By: BLAS Bradley   3/19/24  6:24 PM

## 2024-03-19 NOTE — ASSESSMENT & PLAN NOTE
Stable   no SOB, rales   trace edema   compression hose and elevation   Torsemide   monitor weight   monitor

## 2024-03-19 NOTE — PROGRESS NOTES
PROGRESS NOTE    Subjective   Chief complaint: Ermelinda Benoit is a 89 y.o. female who is an assisted living facility patient being seen and evaluated for  general medical care and monthly follow-up    HPI:  Patient seen and evaluated for general medical care and monthly follow-up.   Patient states feeling much better but still continues to have some weakness s/p COVID and PNA.  Appetite good.  Engaging in activities and meals with other residents.  A-fib stable- no dizziness, palpitations.  COPD stable- no SOB, wheeze.  Continues to work with therapy.  No concerns per nursing.      Objective   Vital signs:  123/67-98.3-58-16-96%    Physical Exam  Constitutional:       Appearance: Normal appearance.   HENT:      Head: Normocephalic.      Mouth/Throat:      Mouth: Mucous membranes are moist.   Eyes:      Extraocular Movements: Extraocular movements intact.   Cardiovascular:      Rate and Rhythm: Normal rate. Rhythm irregular.      Pulses: Normal pulses.      Heart sounds: Murmur heard.   Pulmonary:      Effort: Pulmonary effort is normal.      Breath sounds: Normal breath sounds.   Abdominal:      General: Bowel sounds are normal.      Palpations: Abdomen is soft.   Musculoskeletal:         General: Normal range of motion.      Cervical back: Normal range of motion and neck supple.      Right lower leg: Edema present.      Left lower leg: Edema present.      Comments:   Generalized weakness; trace edema BLE   Skin:     General: Skin is warm and dry.      Capillary Refill: Capillary refill takes less than 2 seconds.   Neurological:      Mental Status: She is alert. Mental status is at baseline.   Psychiatric:         Mood and Affect: Mood normal.         Behavior: Behavior normal.         Assessment/Plan   Problem List Items Addressed This Visit       CHF (congestive heart failure) (CMS/[facility]) - Primary      Stable   no SOB, rales   trace edema   compression hose and elevation   Torsemide   monitor weight   monitor          Weakness      walker for ambulation   wheelchair for distance   Reinforced calling for assistance when needed         COPD (chronic obstructive pulmonary disease) (CMS/[facility])      Stable   no SOB, cough, wheeze   DuoNebs   monitor         Hypertension, essential      Stable   BP within goal   losartan Torsemide   monitor         Controlled atrial fibrillation (CMS/[facility])      Stable   no SOB, dizziness, palpitations   rate controlled   Eliquis   bleeding precautions   monitor            Medications, treatments, and labs reviewed  Continue medications and treatments as listed in EMR    Ashlee Nicholas, APRN-CNP

## 2024-04-05 ENCOUNTER — NURSING HOME VISIT (OUTPATIENT)
Dept: POST ACUTE CARE | Facility: EXTERNAL LOCATION | Age: 89
End: 2024-04-05
Payer: MEDICARE

## 2024-04-05 DIAGNOSIS — J44.9 CHRONIC OBSTRUCTIVE PULMONARY DISEASE, UNSPECIFIED COPD TYPE (MULTI): ICD-10-CM

## 2024-04-05 DIAGNOSIS — I48.91 CONTROLLED ATRIAL FIBRILLATION (MULTI): ICD-10-CM

## 2024-04-05 DIAGNOSIS — I10 HYPERTENSION, ESSENTIAL: ICD-10-CM

## 2024-04-05 DIAGNOSIS — I50.9 CONGESTIVE HEART FAILURE, UNSPECIFIED HF CHRONICITY, UNSPECIFIED HEART FAILURE TYPE (MULTI): Primary | ICD-10-CM

## 2024-04-05 PROCEDURE — 99349 HOME/RES VST EST MOD MDM 40: CPT

## 2024-04-05 NOTE — PROGRESS NOTES
Encounter addended by: La Redmond RN on: 4/5/2024 3:59 PM   Actions taken: Flowsheet accepted, Charge Capture section accepted PROGRESS NOTE    Subjective   Chief complaint: Ermelinda Benoit is a 89 y.o. female who is an acute skilled patient being seen and evaluated for weakness    HPI:  Patient seen for general medical care.  A-fib is rate controlled.. No palpitations.  COPD is stable. No wheezing.  HTN is controlled. BP at goal for age.    7/20/23 Patient  admitted to SNF for PT OT S/P fall.  Patient has comminuted fracture right humerus.  PMH COPD CVA right foot ulcer HTN A-fib HF AS HDL diverticulosis cholelithiasis.   Patient at baseline mentation, cooperative, pleasant Patient working with PT on gait training and transfers, and with ST for speech.  Appetite good. Sleeping well.  Denies F/C/N/V/D, cough, SOB, chest pain.  RUE in sling.    8/1/23 Patient sitting up at bedside. States working in therapy and is now sore  in R shoulder. No injury or overuse. States sleeping well and appetite is good. RUE remains in sling. Offers no c/o f/c/n/v/d cough sob chest pain.    8/2/23  Patient continues to work with therapy for transfers gait training and mobility.  States feeling better today.  Continues to wear his sling.  MSPs intact.  Patient offers no complaints at time.  No concerns per nursing.    8/10/23Patient states doing well today.  Sitting up at bedside wearing sling RUE.  MSPs intact.  Continues to work with therapy for gait training and transfers.  Patient offers no complaints at time.  Pain managed.  HTN and COPD stable.  No concerns per nursing        Objective   Vital signs: 135/66-97.3-77-18-96%    Physical Exam  Constitutional:       Appearance: Normal appearance.   HENT:      Mouth/Throat:      Mouth: Mucous membranes are moist.   Eyes:      Extraocular Movements: Extraocular movements intact.   Cardiovascular:      Rate and Rhythm: Normal rate. Rhythm irregular.      Pulses: Normal pulses.      Heart sounds: Normal heart sounds.   Pulmonary:      Effort: Pulmonary effort is normal.      Breath sounds: Normal breath sounds.    Abdominal:      General: Bowel sounds are normal.      Palpations: Abdomen is soft.   Musculoskeletal:         General: Normal range of motion.      Cervical back: Normal range of motion and neck supple.      Comments:  Limited ROM RUE   Sling   MSPs intact   Skin:     General: Skin is warm and dry.      Capillary Refill: Capillary refill takes less than 2 seconds.   Neurological:      Mental Status: She is alert. Mental status is at baseline.   Psychiatric:         Mood and Affect: Mood normal.         Behavior: Behavior normal.         Assessment/Plan   Problem List Items Addressed This Visit       CHF (congestive heart failure) (CMS/HCC)      Stable   no SOB, Rales, edema   Torsemide   monitor         Closed displaced comminuted fracture of shaft of right humerus with routine healing      Stable   Sling   MSPs intact   Therapy   monitor         Atrial fibrillation (CMS/HCC) - Primary      Stable   no SOB, palpitations   Eliquis   bleeding precautions   monitor          Medications, treatments, and labs reviewed  Continue medications and treatments as listed in EMR    MARK Bradley-CNP

## 2024-04-05 NOTE — LETTER
Patient: Ermelinda Benoit  : 3/14/1933    Encounter Date: 2024    PROGRESS NOTE    Subjective  Chief complaint: Ermelinda Benoit is a 89 y.o. female who is an assisted living facility patient being seen and evaluated for  general medical care monthly follow-up    HPI:  Patient seen and evaluated for general medical care monthly follow-up.  Patient at baseline mentation, cooperative, pleasant.  Converses easily.  Offers no complaints at time.  A-fib stable - no SOB, dizziness, palpitations.  COPD stable - no cough, wheeze.  Continues to work with therapy for strengthening and mobility.  Appetite good.  Sleeping well.  Engages in activities with other residents.  No concerns per nursing      Objective  Vital signs:  114/82-97.4-62-18-95%    Physical Exam  Constitutional:       Appearance: Normal appearance.   HENT:      Head: Normocephalic.      Mouth/Throat:      Mouth: Mucous membranes are moist.   Eyes:      Extraocular Movements: Extraocular movements intact.   Cardiovascular:      Rate and Rhythm: Normal rate. Rhythm irregular.      Pulses: Normal pulses.      Heart sounds: Murmur heard.   Pulmonary:      Effort: Pulmonary effort is normal.      Breath sounds: Normal breath sounds.   Abdominal:      General: Bowel sounds are normal.      Palpations: Abdomen is soft.   Musculoskeletal:         General: Normal range of motion.      Cervical back: Normal range of motion and neck supple.      Right lower leg: Edema present.      Left lower leg: Edema present.      Comments:  generalized weakness   Skin:     General: Skin is warm and dry.      Capillary Refill: Capillary refill takes less than 2 seconds.   Neurological:      Mental Status: She is alert. Mental status is at baseline.   Psychiatric:         Mood and Affect: Mood normal.         Behavior: Behavior normal.         Assessment/Plan  Problem List Items Addressed This Visit       CHF (congestive heart failure) (Multi) - Primary      Stable   no  SOB, rales   trace edema   compression hose and elevation   Torsemide   monitor weight   monitor         COPD (chronic obstructive pulmonary disease) (Multi)      Stable   no SOB, cough, wheeze   DuoNebs   monitor         Hypertension, essential      Stable   BP within goal   losartan Torsemide   monitor         Controlled atrial fibrillation (Multi)      Stable   no SOB, dizziness, palpitations   rate controlled   Eliquis   bleeding precautions   monitor            Medications, treatments, and labs reviewed  Continue medications and treatments as listed in EMR    BLAS Bradley      Electronically Signed By: BLAS Bradley   4/23/24 10:29 PM

## 2024-04-23 PROBLEM — N39.0 URINARY TRACT INFECTION: Status: ACTIVE | Noted: 2023-04-29

## 2024-04-23 PROBLEM — I73.9 PERIPHERAL VASCULAR DISEASE (CMS-HCC): Status: ACTIVE | Noted: 2023-10-25

## 2024-04-24 ENCOUNTER — TELEPHONE (OUTPATIENT)
Dept: CARDIOLOGY | Facility: CLINIC | Age: 89
End: 2024-04-24
Payer: MEDICARE

## 2024-04-24 DIAGNOSIS — I50.32 CHRONIC DIASTOLIC CONGESTIVE HEART FAILURE (MULTI): ICD-10-CM

## 2024-04-24 DIAGNOSIS — I11.9 HYPERTENSION WITH HEART DISEASE: ICD-10-CM

## 2024-04-24 DIAGNOSIS — T82.897D AORTIC PROSTHETIC VALVE REGURGITATION, SUBSEQUENT ENCOUNTER: ICD-10-CM

## 2024-04-24 NOTE — PROGRESS NOTES
PROGRESS NOTE    Subjective   Chief complaint: Ermelinda Benoit is a 89 y.o. female who is an assisted living facility patient being seen and evaluated for  general medical care monthly follow-up    HPI:  Patient seen and evaluated for general medical care monthly follow-up.  Patient at baseline mentation, cooperative, pleasant.  Converses easily.  Offers no complaints at time.  A-fib stable - no SOB, dizziness, palpitations.  COPD stable - no cough, wheeze.  Continues to work with therapy for strengthening and mobility.  Appetite good.  Sleeping well.  Engages in activities with other residents.  No concerns per nursing      Objective   Vital signs:  114/82-97.4-62-18-95%    Physical Exam  Constitutional:       Appearance: Normal appearance.   HENT:      Head: Normocephalic.      Mouth/Throat:      Mouth: Mucous membranes are moist.   Eyes:      Extraocular Movements: Extraocular movements intact.   Cardiovascular:      Rate and Rhythm: Normal rate. Rhythm irregular.      Pulses: Normal pulses.      Heart sounds: Murmur heard.   Pulmonary:      Effort: Pulmonary effort is normal.      Breath sounds: Normal breath sounds.   Abdominal:      General: Bowel sounds are normal.      Palpations: Abdomen is soft.   Musculoskeletal:         General: Normal range of motion.      Cervical back: Normal range of motion and neck supple.      Right lower leg: Edema present.      Left lower leg: Edema present.      Comments:  generalized weakness   Skin:     General: Skin is warm and dry.      Capillary Refill: Capillary refill takes less than 2 seconds.   Neurological:      Mental Status: She is alert. Mental status is at baseline.   Psychiatric:         Mood and Affect: Mood normal.         Behavior: Behavior normal.         Assessment/Plan   Problem List Items Addressed This Visit       CHF (congestive heart failure) (Multi) - Primary      Stable   no SOB, rales   trace edema   compression hose and elevation   Torsemide    monitor weight   monitor         COPD (chronic obstructive pulmonary disease) (Multi)      Stable   no SOB, cough, wheeze   DuoNebs   monitor         Hypertension, essential      Stable   BP within goal   losartan Torsemide   monitor         Controlled atrial fibrillation (Multi)      Stable   no SOB, dizziness, palpitations   rate controlled   Eliquis   bleeding precautions   monitor            Medications, treatments, and labs reviewed  Continue medications and treatments as listed in EMR    Ashlee Nicholas, APRN-CNP

## 2024-04-24 NOTE — TELEPHONE ENCOUNTER
Pt's dtr called stating pt has an appt 5/15/24. Pt's dtr asking if pt can get an echo ordered prior to visit? Pt's dtr reports increased fatigue over the last couple of months. Pt had covid/pnuemonia end of January and is not sure if that is cause of it or if cardiac related. Pt's last echo 4/2023.

## 2024-04-30 ENCOUNTER — NURSING HOME VISIT (OUTPATIENT)
Dept: POST ACUTE CARE | Facility: EXTERNAL LOCATION | Age: 89
End: 2024-04-30
Payer: MEDICARE

## 2024-04-30 DIAGNOSIS — R53.83 FATIGUE DUE TO DEPRESSION: ICD-10-CM

## 2024-04-30 DIAGNOSIS — F32.A FATIGUE DUE TO DEPRESSION: ICD-10-CM

## 2024-04-30 DIAGNOSIS — I10 HYPERTENSION, ESSENTIAL: ICD-10-CM

## 2024-04-30 DIAGNOSIS — R26.2 AMBULATORY DYSFUNCTION: Primary | ICD-10-CM

## 2024-04-30 DIAGNOSIS — I48.91 CONTROLLED ATRIAL FIBRILLATION (MULTI): ICD-10-CM

## 2024-04-30 DIAGNOSIS — R42 DIZZINESS: ICD-10-CM

## 2024-04-30 DIAGNOSIS — I50.9 CONGESTIVE HEART FAILURE, UNSPECIFIED HF CHRONICITY, UNSPECIFIED HEART FAILURE TYPE (MULTI): ICD-10-CM

## 2024-04-30 PROCEDURE — 99349 HOME/RES VST EST MOD MDM 40: CPT

## 2024-04-30 NOTE — LETTER
Patient: Ermelinda Benoit  : 3/14/1933    Encounter Date: 2024    PROGRESS NOTE    Subjective  Chief complaint: Ermelinda Benoit is a 89 y.o. female who is an assisted living facility patient being seen and evaluated for fatigue    HPI:  Patient seen and evaluated for complaints of fatigue, ongoing for weeks.  Labs ordered and reviewed- WNL.  Patient complains of feeling dizzy with any new medication and most of her regular medications.  Offers no complaints of chest pain, palpitations, SOB, lightheadedness or dizziness with position changes.  Patient not drinking enough p.o. fluids - indicates drinking 1, 16 ounce water bottle per day.  Discussed possible causes of dizziness      Objective  Vital signs: 121/77-97.4-63-18-95%    Physical Exam  Constitutional:       Appearance: Normal appearance.   HENT:      Head: Normocephalic.      Nose: Nose normal.      Mouth/Throat:      Mouth: Mucous membranes are moist.   Eyes:      Extraocular Movements: Extraocular movements intact.   Cardiovascular:      Rate and Rhythm: Normal rate. Rhythm irregular.      Pulses: Normal pulses.      Heart sounds: Normal heart sounds.   Pulmonary:      Effort: Pulmonary effort is normal.      Breath sounds: Normal breath sounds.   Abdominal:      General: Bowel sounds are normal.      Palpations: Abdomen is soft.   Musculoskeletal:         General: Normal range of motion.      Cervical back: Normal range of motion and neck supple.      Right lower leg: Edema present.      Left lower leg: Edema present.      Comments: , weakness   Skin:     General: Skin is warm and dry.      Capillary Refill: Capillary refill takes less than 2 seconds.   Neurological:      Mental Status: She is alert. Mental status is at baseline.   Psychiatric:         Mood and Affect: Mood normal.         Behavior: Behavior normal.         Assessment/Plan  Problem List Items Addressed This Visit       CHF (congestive heart failure) (Multi)      Stable   no  SOB, rales   trace edema   compression hose and elevation   Torsemide   monitor weight   monitor         Hypertension, essential      Stable   BP within goal   losartan Torsemide   monitor         Ambulatory dysfunction - Primary      ambulate with walker assist due to weakness   Patient to use wheelchair for distance         Controlled atrial fibrillation (Multi)      Stable   no SOB, dizziness, palpitations   rate controlled   Eliquis   bleeding precautions   monitor           Dizziness      Continues to complain of dizziness - not related to position changes   Patient insists dizziness is related to all of her medications that she takes   encourage p.o. fluid intake   Reinforced calling for assistance when changing positions to avoid falls         Fatigue due to depression      Recent lab work WNL   Discussed depression with patient   Patient endorses not enjoying certain activities anymore, withdrew from therapy, prefers to stay in her room by herself, decrease in appetite   No SI HI   Will speak with daughter regarding escitalopram            Medications, treatments, and labs reviewed  Continue medications and treatments as listed in EMR    BLAS Bradley      Electronically Signed By: BLAS Bradley   5/28/24  4:13 PM

## 2024-05-03 ENCOUNTER — NURSING HOME VISIT (OUTPATIENT)
Dept: POST ACUTE CARE | Facility: EXTERNAL LOCATION | Age: 89
End: 2024-05-03
Payer: MEDICARE

## 2024-05-03 DIAGNOSIS — I10 HYPERTENSION, ESSENTIAL: ICD-10-CM

## 2024-05-03 DIAGNOSIS — J44.9 CHRONIC OBSTRUCTIVE PULMONARY DISEASE, UNSPECIFIED COPD TYPE (MULTI): ICD-10-CM

## 2024-05-03 DIAGNOSIS — R53.83 FATIGUE DUE TO DEPRESSION: ICD-10-CM

## 2024-05-03 DIAGNOSIS — F32.A FATIGUE DUE TO DEPRESSION: ICD-10-CM

## 2024-05-03 DIAGNOSIS — I48.91 CONTROLLED ATRIAL FIBRILLATION (MULTI): Primary | ICD-10-CM

## 2024-05-03 DIAGNOSIS — F32.0 CURRENT MILD EPISODE OF MAJOR DEPRESSIVE DISORDER WITHOUT PRIOR EPISODE (CMS-HCC): ICD-10-CM

## 2024-05-03 PROCEDURE — 99348 HOME/RES VST EST LOW MDM 30: CPT

## 2024-05-03 NOTE — LETTER
Patient: Ermelinda Benoit  : 3/14/1933    Encounter Date: 2024    PROGRESS NOTE    Subjective  Chief complaint: Ermelinda Benoit is a 89 y.o. female who is an assisted living facility patient being seen and evaluated for  depression    HPI:  Patient seen and evaluated for depression.  Spoke with daughter Alexus regarding patient's overall mood and behavior.  Daughter does not want patient tested for COVID or influenza due to isolation period, which she feels would not be good for her.  Patient  endorses lack of enjoyment in doing things, feeling down, poor appetite and prefers to stay in her room by herself.  Discussed medications and side effects.  Reinforced adequate p.o. fluid intake.  Patient does not drink fluids consistently and has 1 large water bottle that she sips from throughout the day.  No concerns per nursing      Objective  Vital signs:  164/87-97.7-74-20-96%    Physical Exam  Constitutional:       Appearance: Normal appearance.   HENT:      Head: Normocephalic.      Mouth/Throat:      Mouth: Mucous membranes are moist.   Eyes:      Extraocular Movements: Extraocular movements intact.   Cardiovascular:      Rate and Rhythm: Normal rate. Rhythm irregular.      Pulses: Normal pulses.      Heart sounds: Murmur heard.   Pulmonary:      Effort: Pulmonary effort is normal.      Breath sounds: Normal breath sounds.   Abdominal:      General: Bowel sounds are normal.      Palpations: Abdomen is soft.   Musculoskeletal:         General: Normal range of motion.      Cervical back: Normal range of motion and neck supple.      Comments:  weakness   Skin:     General: Skin is warm and dry.      Capillary Refill: Capillary refill takes less than 2 seconds.   Neurological:      Mental Status: She is alert. Mental status is at baseline.   Psychiatric:         Mood and Affect: Mood normal.         Behavior: Behavior normal.         Assessment/Plan  Problem List Items Addressed This Visit       COPD (chronic  obstructive pulmonary disease) (Multi)      Stable   no SOB, cough, wheeze   DuoNebs   monitor         Hypertension, essential      Stable   BP within goal   losartan Torsemide   monitor         Controlled atrial fibrillation (Multi) - Primary      Stable   no SOB, dizziness, palpitations   rate controlled   Eliquis   bleeding precautions   monitor           Fatigue due to depression      Recent lab work WNL   Discussed depression with patient   Patient endorses not enjoying certain activities anymore, withdrew from therapy, prefers to stay in her room by herself, decrease in appetite   No SI HI   Will speak with daughter regarding escitalopram           Current mild episode of major depressive disorder (CMS-HCC)      to start Lexapro   monitor behaviors   monitor for changes in appetite or sleeping pattern          Medications, treatments, and labs reviewed  Continue medications and treatments as listed in EMR    BLAS Bradley      Electronically Signed By: BLAS Bradley   5/28/24  6:48 PM

## 2024-05-06 ENCOUNTER — TELEPHONE (OUTPATIENT)
Dept: CARDIOLOGY | Facility: CLINIC | Age: 89
End: 2024-05-06
Payer: MEDICARE

## 2024-05-06 NOTE — TELEPHONE ENCOUNTER
Answering service message: Daughter called , the doctor at the assisted living facility wants to start her on Lexapro and she has questions

## 2024-05-15 ENCOUNTER — OFFICE VISIT (OUTPATIENT)
Dept: CARDIOLOGY | Facility: CLINIC | Age: 89
End: 2024-05-15
Payer: MEDICARE

## 2024-05-15 ENCOUNTER — HOSPITAL ENCOUNTER (OUTPATIENT)
Dept: RADIOLOGY | Facility: CLINIC | Age: 89
Discharge: HOME | End: 2024-05-15
Payer: MEDICARE

## 2024-05-15 VITALS
WEIGHT: 120.8 LBS | SYSTOLIC BLOOD PRESSURE: 150 MMHG | BODY MASS INDEX: 22.23 KG/M2 | OXYGEN SATURATION: 95 % | DIASTOLIC BLOOD PRESSURE: 72 MMHG | HEART RATE: 57 BPM | HEIGHT: 62 IN

## 2024-05-15 DIAGNOSIS — I48.21 PERMANENT ATRIAL FIBRILLATION (MULTI): ICD-10-CM

## 2024-05-15 DIAGNOSIS — Z95.2 HISTORY OF AORTIC VALVE REPLACEMENT: ICD-10-CM

## 2024-05-15 DIAGNOSIS — I11.9 HYPERTENSION WITH HEART DISEASE: ICD-10-CM

## 2024-05-15 DIAGNOSIS — R06.02 SHORTNESS OF BREATH: ICD-10-CM

## 2024-05-15 DIAGNOSIS — Z95.2 S/P TAVR (TRANSCATHETER AORTIC VALVE REPLACEMENT): ICD-10-CM

## 2024-05-15 DIAGNOSIS — T82.897D AORTIC PROSTHETIC VALVE REGURGITATION, SUBSEQUENT ENCOUNTER: ICD-10-CM

## 2024-05-15 DIAGNOSIS — I10 HYPERTENSION, ESSENTIAL: ICD-10-CM

## 2024-05-15 DIAGNOSIS — I48.11 LONGSTANDING PERSISTENT ATRIAL FIBRILLATION (MULTI): ICD-10-CM

## 2024-05-15 DIAGNOSIS — R06.02 SHORTNESS OF BREATH: Primary | ICD-10-CM

## 2024-05-15 PROCEDURE — 3077F SYST BP >= 140 MM HG: CPT | Performed by: STUDENT IN AN ORGANIZED HEALTH CARE EDUCATION/TRAINING PROGRAM

## 2024-05-15 PROCEDURE — 93000 ELECTROCARDIOGRAM COMPLETE: CPT | Performed by: STUDENT IN AN ORGANIZED HEALTH CARE EDUCATION/TRAINING PROGRAM

## 2024-05-15 PROCEDURE — 1159F MED LIST DOCD IN RCRD: CPT | Performed by: STUDENT IN AN ORGANIZED HEALTH CARE EDUCATION/TRAINING PROGRAM

## 2024-05-15 PROCEDURE — 71046 X-RAY EXAM CHEST 2 VIEWS: CPT

## 2024-05-15 PROCEDURE — 1157F ADVNC CARE PLAN IN RCRD: CPT | Performed by: STUDENT IN AN ORGANIZED HEALTH CARE EDUCATION/TRAINING PROGRAM

## 2024-05-15 PROCEDURE — 3078F DIAST BP <80 MM HG: CPT | Performed by: STUDENT IN AN ORGANIZED HEALTH CARE EDUCATION/TRAINING PROGRAM

## 2024-05-15 PROCEDURE — 99214 OFFICE O/P EST MOD 30 MIN: CPT | Performed by: STUDENT IN AN ORGANIZED HEALTH CARE EDUCATION/TRAINING PROGRAM

## 2024-05-15 PROCEDURE — 1036F TOBACCO NON-USER: CPT | Performed by: STUDENT IN AN ORGANIZED HEALTH CARE EDUCATION/TRAINING PROGRAM

## 2024-05-15 PROCEDURE — 71046 X-RAY EXAM CHEST 2 VIEWS: CPT | Performed by: RADIOLOGY

## 2024-05-15 RX ORDER — ESCITALOPRAM OXALATE 5 MG/1
5 TABLET ORAL DAILY
COMMUNITY
Start: 2024-05-03

## 2024-05-15 NOTE — PROGRESS NOTES
"Chief Complaint:   Heart Failure, Post-op TAVR, Hypertension, Atrial Fibrillation, and Hx of CVA (6 month follow up)     History Of Present Illness:    Ermelinda Benoit is a 89 y.o. female returns to clinic for follow-up.  Patient had a bout of COVID-pneumonia in January and has been recovering since that time.  Today she states she has been more fatigued and tired.  She has associated conversational dyspnea.  No chest pain.  She also endorses dizziness which is chronic for her.  No falls.  Blood pressures have been slightly on the higher side.  EKG today is consistent with atrial fibrillation with a heart rate of 60.     Last Recorded Vitals:  Vitals:    05/15/24 1537   BP: 150/72   BP Location: Left arm   Patient Position: Sitting   BP Cuff Size: Adult   Pulse: 57   SpO2: 95%   Weight: 54.8 kg (120 lb 12.8 oz)   Height: 1.575 m (5' 2\")       Review of Systems  ROS      Allergies:  Codeine, Iodinated contrast media, Macrolide antibiotics, Moxifloxacin, Penicillins, Adhesive, Erythromycin base, Nitrofurantoin, Propoxyphene, Tetracyclines, Erythromycin, and Sulfa (sulfonamide antibiotics)    Outpatient Medications:  Current Outpatient Medications   Medication Instructions    acetaminophen (TYLENOL) 500 mg, oral, Every 4 hours PRN    apixaban (ELIQUIS) 2.5 mg, oral, 2 times daily    cholecalciferol (VITAMIN D-3) 2,000 Units, oral, Daily    escitalopram (LEXAPRO) 5 mg, oral, Daily    losartan (COZAAR) 100 mg, oral, Daily    melatonin 3 mg, oral, Nightly    potassium chloride CR 10 mEq ER tablet 10 mEq, oral, Daily    rosuvastatin (Crestor) 5 mg tablet 1 tablet, oral, Daily    sennosides-docusate sodium (Martha-Colace) 8.6-50 mg tablet 1 tablet, oral, Daily    torsemide (DEMADEX) 10 mg, oral, Daily       Physical Exam:  General: No acute distress,  A&O x3, frail, daughter is present  Skin: Warm and dry  Neck: JVD is not elevated  ENT: Moist mucous membranes no lesions appreciated  Pulmonary: Breath sounds are diminished " bilaterally  Cards: Regular rate rhythm, no murmurs gallops or rubs appreciated normal S1-S2  Abdomen: Soft nontender nondistended  Extremities: No edema or cyanosis  Psych: Appropriate mood and affect        Last Labs:  CBC -  Lab Results   Component Value Date    WBC 7.2 07/08/2023    HGB 9.5 (L) 07/08/2023    HCT 31.4 (L) 07/08/2023    MCV 85 07/08/2023     07/08/2023       CMP -  Lab Results   Component Value Date    CALCIUM 9.4 07/08/2023    PHOS 3.7 03/01/2018    PROT 6.7 04/26/2023    ALBUMIN 4.1 04/26/2023    AST 18 04/26/2023    ALT 12 04/26/2023    ALKPHOS 75 04/26/2023    BILITOT 1.1 04/26/2023       LIPID PANEL -   Lab Results   Component Value Date    CHOL 176 04/11/2019    HDL 57.6 04/11/2019    CHHDL 3.1 04/11/2019       RENAL FUNCTION PANEL -   Lab Results   Component Value Date    GLUCOSE 89 07/08/2023     07/08/2023    K 4.2 07/08/2023     07/08/2023    CO2 27 07/08/2023    ANIONGAP 13 07/08/2023    BUN 26 (H) 07/08/2023    CREATININE 0.82 07/08/2023    CALCIUM 9.4 07/08/2023    PHOS 3.7 03/01/2018    ALBUMIN 4.1 04/26/2023        Lab Results   Component Value Date     (H) 04/26/2023       Last Cardiology Tests:  ECG:  No results found for this or any previous visit (from the past 4464 hour(s)).     Echo:  No results found for this or any previous visit from the past 1095 days.       Assessment and Plan    # Shortness of breath and fatigue: Recent hospitalization for COVID-pneumonia in January of this year.  Patient is endorsing worsening symptoms of shortness of breath and fatigue.  EKG shows rate controlled atrial fibrillation.  Echocardiogram ordered.  Chest x-ray ordered.  Most recent labs reviewed.  No significant anemia.  Further recs to follow based on study findings.    1. Severe aortic stenosis status post TAVR with a Medtronic CoreValve evolute 26 mm. Postprocedure echocardiogram shows mild perivalvular aortic regurgitation.   -Remains on Eliquis for A-fib which  will continue.  No need for aspirin.  -Endocarditis prophylaxis where appropriate.     2. Heart failure preserved ejection fraction: EF of 60% with moderate elevated RVSP.  Prior hospitalization for decompensated heart failure and UTI.  Continue torsemide 10 mg daily.       3. Permanent atrial fibrillation: Slow ventricular rate. [id number] score of 7. Anticoagulated with Eliquis. Previously on Coumadin. Not on beta-blockers due to bradycardia.       4. Hypertension: We have allowed for permissive hypertension given patient's chronic history of dizziness likely related to CVA events. Aim to maintain blood pressure less than 150/90.     5. Dyslipidemia: On Crestor 5 mg.      6. Dizziness: Chronic appears to be multifactorial secondary to prior stroke, postural dizziness and possibly some chronic intracranial vascular insufficiency. Her symptoms did not improve after correction of her aortic valve stenosis. We will allow for permissive hypertension as mentioned above.      (This note was generated with voice recognition software and may contain errors including spelling, grammar, syntax and missed recognition of what was dictated, of which may not have been fully corrected)     Ej Hoffman MD PhD

## 2024-05-16 DIAGNOSIS — R93.89 ABNORMAL CXR: Primary | ICD-10-CM

## 2024-05-16 DIAGNOSIS — R53.1 WEAKNESS: ICD-10-CM

## 2024-05-16 DIAGNOSIS — R06.09 DYSPNEA ON EXERTION: ICD-10-CM

## 2024-05-17 ENCOUNTER — TELEPHONE (OUTPATIENT)
Dept: CARDIOLOGY | Facility: CLINIC | Age: 89
End: 2024-05-17
Payer: MEDICARE

## 2024-05-17 ENCOUNTER — HOSPITAL ENCOUNTER (OUTPATIENT)
Dept: RADIOLOGY | Facility: CLINIC | Age: 89
Discharge: HOME | End: 2024-05-17
Payer: MEDICARE

## 2024-05-17 DIAGNOSIS — R06.09 DYSPNEA ON EXERTION: ICD-10-CM

## 2024-05-17 DIAGNOSIS — R53.1 WEAKNESS: ICD-10-CM

## 2024-05-17 DIAGNOSIS — R93.89 ABNORMAL CXR: ICD-10-CM

## 2024-05-17 PROCEDURE — 71250 CT THORAX DX C-: CPT

## 2024-05-17 NOTE — TELEPHONE ENCOUNTER
----- Message from Ej Hoffman MD PhD sent at 5/17/2024  1:22 PM EDT -----  Can you notify the daughter that the CT scan was ok. No evidence of PNA. We will proceed with echo as planned..   ----- Message -----  From: Interface, Radiology Results In  Sent: 5/17/2024  12:15 PM EDT  To: Ej Hoffman MD PhD

## 2024-05-21 ENCOUNTER — HOSPITAL ENCOUNTER (OUTPATIENT)
Dept: CARDIOLOGY | Facility: CLINIC | Age: 89
Discharge: HOME | End: 2024-05-21
Payer: MEDICARE

## 2024-05-21 VITALS — WEIGHT: 120 LBS | BODY MASS INDEX: 21.95 KG/M2

## 2024-05-21 DIAGNOSIS — T82.897A OTHER SPECIFIED COMPLICATION OF CARDIAC PROSTHETIC DEVICES, IMPLANTS AND GRAFTS, INITIAL ENCOUNTER: ICD-10-CM

## 2024-05-21 DIAGNOSIS — I11.9 HYPERTENSION WITH HEART DISEASE: ICD-10-CM

## 2024-05-21 DIAGNOSIS — T82.897D AORTIC PROSTHETIC VALVE REGURGITATION, SUBSEQUENT ENCOUNTER: ICD-10-CM

## 2024-05-21 DIAGNOSIS — I50.32 CHRONIC DIASTOLIC CONGESTIVE HEART FAILURE (MULTI): ICD-10-CM

## 2024-05-21 PROCEDURE — 93306 TTE W/DOPPLER COMPLETE: CPT | Performed by: STUDENT IN AN ORGANIZED HEALTH CARE EDUCATION/TRAINING PROGRAM

## 2024-05-21 PROCEDURE — 93306 TTE W/DOPPLER COMPLETE: CPT

## 2024-05-22 PROBLEM — G47.00 INSOMNIA, UNSPECIFIED: Status: ACTIVE | Noted: 2023-07-13

## 2024-05-22 PROBLEM — K57.30 DIVERTICULOSIS OF LARGE INTESTINE WITHOUT PERFORATION OR ABSCESS WITHOUT BLEEDING: Status: ACTIVE | Noted: 2023-07-13

## 2024-05-22 PROBLEM — I51.7 CARDIOMEGALY: Status: ACTIVE | Noted: 2023-07-13

## 2024-05-22 PROBLEM — I63.9 CEREBRAL INFARCTION, UNSPECIFIED (MULTI): Status: ACTIVE | Noted: 2023-07-13

## 2024-05-22 PROBLEM — L97.511 NON-PRESSURE CHRONIC ULCER OF OTHER PART OF RIGHT FOOT LIMITED TO BREAKDOWN OF SKIN (MULTI): Status: ACTIVE | Noted: 2023-07-13

## 2024-05-22 PROBLEM — I35.0 NONRHEUMATIC AORTIC (VALVE) STENOSIS: Status: ACTIVE | Noted: 2023-07-13

## 2024-05-22 PROBLEM — E78.5 HYPERLIPIDEMIA, UNSPECIFIED: Status: ACTIVE | Noted: 2023-07-13

## 2024-05-22 PROBLEM — M79.621 PAIN IN RIGHT UPPER ARM: Status: ACTIVE | Noted: 2023-07-13

## 2024-05-22 PROBLEM — K59.00 CONSTIPATION, UNSPECIFIED: Status: ACTIVE | Noted: 2023-07-13

## 2024-05-22 PROBLEM — M62.81 MUSCLE WEAKNESS (GENERALIZED): Status: ACTIVE | Noted: 2023-07-13

## 2024-05-22 PROBLEM — E04.2 NONTOXIC MULTINODULAR GOITER: Status: ACTIVE | Noted: 2023-07-13

## 2024-05-22 LAB
AORTIC VALVE MEAN GRADIENT: 6.1 MMHG
AORTIC VALVE PEAK VELOCITY: 1.87 M/S
AV PEAK GRADIENT: 14 MMHG
AVA (PEAK VEL): 1.47 CM2
AVA (VTI): 1.57 CM2
EJECTION FRACTION APICAL 4 CHAMBER: 56.8
LEFT VENTRICLE INTERNAL DIMENSION DIASTOLE: 4.47 CM (ref 3.5–6)
LEFT VENTRICULAR OUTFLOW TRACT DIAMETER: 1.88 CM
LV EJECTION FRACTION BIPLANE: 57 %
RIGHT VENTRICLE FREE WALL PEAK S': 11 CM/S
RIGHT VENTRICLE PEAK SYSTOLIC PRESSURE: 44.3 MMHG
TRICUSPID ANNULAR PLANE SYSTOLIC EXCURSION: 1.7 CM

## 2024-05-23 ENCOUNTER — TELEPHONE (OUTPATIENT)
Dept: CARDIOLOGY | Facility: CLINIC | Age: 89
End: 2024-05-23
Payer: MEDICARE

## 2024-05-23 NOTE — TELEPHONE ENCOUNTER
----- Message from Ej Hoffman MD PhD sent at 5/22/2024  7:55 PM EDT -----  Please notify patient or daughter that echo findings are stable. No further testing for now. Follow up as planned.

## 2024-05-28 ENCOUNTER — NURSING HOME VISIT (OUTPATIENT)
Dept: POST ACUTE CARE | Facility: EXTERNAL LOCATION | Age: 89
End: 2024-05-28
Payer: MEDICARE

## 2024-05-28 DIAGNOSIS — R79.89 ABNORMAL BILIRUBIN TEST: Primary | ICD-10-CM

## 2024-05-28 DIAGNOSIS — F32.A FATIGUE DUE TO DEPRESSION: ICD-10-CM

## 2024-05-28 DIAGNOSIS — I48.91 CONTROLLED ATRIAL FIBRILLATION (MULTI): ICD-10-CM

## 2024-05-28 DIAGNOSIS — R53.83 FATIGUE DUE TO DEPRESSION: ICD-10-CM

## 2024-05-28 DIAGNOSIS — I50.9 CONGESTIVE HEART FAILURE, UNSPECIFIED HF CHRONICITY, UNSPECIFIED HEART FAILURE TYPE (MULTI): ICD-10-CM

## 2024-05-28 PROBLEM — F32.0 CURRENT MILD EPISODE OF MAJOR DEPRESSIVE DISORDER (CMS-HCC): Status: ACTIVE | Noted: 2024-05-28

## 2024-05-28 PROCEDURE — 99347 HOME/RES VST EST SF MDM 20: CPT

## 2024-05-28 NOTE — ASSESSMENT & PLAN NOTE
Recent lab work WNL   Discussed depression with patient   Patient endorses not enjoying certain activities anymore, withdrew from therapy, prefers to stay in her room by herself, decrease in appetite   No SI HI   Will speak with daughter regarding escitalopram

## 2024-05-28 NOTE — PROGRESS NOTES
PROGRESS NOTE    Subjective   Chief complaint: Ermelinda Benoit is a 89 y.o. female who is an assisted living facility patient being seen and evaluated for  depression    HPI:  Patient seen and evaluated for depression.  Spoke with daughter Alexus regarding patient's overall mood and behavior.  Daughter does not want patient tested for COVID or influenza due to isolation period, which she feels would not be good for her.  Patient  endorses lack of enjoyment in doing things, feeling down, poor appetite and prefers to stay in her room by herself.  Discussed medications and side effects.  Reinforced adequate p.o. fluid intake.  Patient does not drink fluids consistently and has 1 large water bottle that she sips from throughout the day.  No concerns per nursing      Objective   Vital signs:  164/87-97.7-74-20-96%    Physical Exam  Constitutional:       Appearance: Normal appearance.   HENT:      Head: Normocephalic.      Mouth/Throat:      Mouth: Mucous membranes are moist.   Eyes:      Extraocular Movements: Extraocular movements intact.   Cardiovascular:      Rate and Rhythm: Normal rate. Rhythm irregular.      Pulses: Normal pulses.      Heart sounds: Murmur heard.   Pulmonary:      Effort: Pulmonary effort is normal.      Breath sounds: Normal breath sounds.   Abdominal:      General: Bowel sounds are normal.      Palpations: Abdomen is soft.   Musculoskeletal:         General: Normal range of motion.      Cervical back: Normal range of motion and neck supple.      Comments:  weakness   Skin:     General: Skin is warm and dry.      Capillary Refill: Capillary refill takes less than 2 seconds.   Neurological:      Mental Status: She is alert. Mental status is at baseline.   Psychiatric:         Mood and Affect: Mood normal.         Behavior: Behavior normal.         Assessment/Plan   Problem List Items Addressed This Visit       COPD (chronic obstructive pulmonary disease) (Multi)      Stable   no SOB, cough,  wheeze   DuoNebs   monitor         Hypertension, essential      Stable   BP within goal   losartan Torsemide   monitor         Controlled atrial fibrillation (Multi) - Primary      Stable   no SOB, dizziness, palpitations   rate controlled   Eliquis   bleeding precautions   monitor           Fatigue due to depression      Recent lab work WNL   Discussed depression with patient   Patient endorses not enjoying certain activities anymore, withdrew from therapy, prefers to stay in her room by herself, decrease in appetite   No SI HI   Will speak with daughter regarding escitalopram           Current mild episode of major depressive disorder (CMS-HCC)      to start Lexapro   monitor behaviors   monitor for changes in appetite or sleeping pattern          Medications, treatments, and labs reviewed  Continue medications and treatments as listed in EMR    Ashlee Nicholas, APRN-CNP

## 2024-05-28 NOTE — ASSESSMENT & PLAN NOTE
Continues to complain of dizziness - not related to position changes   Patient insists dizziness is related to all of her medications that she takes   encourage p.o. fluid intake   Reinforced calling for assistance when changing positions to avoid falls

## 2024-05-28 NOTE — LETTER
Patient: Ermelinda Benoit  : 3/14/1933    Encounter Date: 2024    PROGRESS NOTE    Subjective  Chief complaint: Ermelinda Benoit is a 89 y.o. female who is an assisted living facility patient being seen and evaluated for  abnormal lab results    HPI:  Patient seen and evaluated for abnormal lab results.  Bili elevated at 1.8.  Patient offers no complaints of abdominal pain, N/V/D/F/C.  Appetite fair.  Tolerating sips of water throughout day.  A-fib stable - no SOB, chest pain.  BP at goal.  No concerns per nursing      Objective  Vital signs: 123/74-81-16    Physical Exam  Constitutional:       Appearance: Normal appearance.   HENT:      Head: Normocephalic.      Mouth/Throat:      Mouth: Mucous membranes are moist.   Eyes:      Extraocular Movements: Extraocular movements intact.   Cardiovascular:      Rate and Rhythm: Normal rate. Rhythm irregular.      Pulses: Normal pulses.      Heart sounds: Murmur heard.   Pulmonary:      Effort: Pulmonary effort is normal.      Breath sounds: Normal breath sounds.   Abdominal:      General: Bowel sounds are normal.      Palpations: Abdomen is soft.   Musculoskeletal:         General: Normal range of motion.      Cervical back: Normal range of motion and neck supple.      Comments:  weakness   Skin:     General: Skin is warm and dry.      Capillary Refill: Capillary refill takes less than 2 seconds.   Neurological:      Mental Status: She is alert. Mental status is at baseline.   Psychiatric:         Mood and Affect: Mood normal.         Behavior: Behavior normal.         Assessment/Plan  Problem List Items Addressed This Visit       CHF (congestive heart failure) (Multi)      Stable   no SOB, rales   trace edema   compression hose and elevation   Torsemide   monitor weight   monitor         Controlled atrial fibrillation (Multi)      Stable   no SOB, dizziness, palpitations   rate controlled   Eliquis   bleeding precautions   monitor           Fatigue due to  depression      Recent lab work WNL   Discussed depression with patient   Patient endorses not enjoying certain activities anymore, withdrew from therapy, prefers to stay in her room by herself, decrease in appetite   No SI HI   Escitalopram   Psychiatry consult           Abnormal bilirubin test - Primary      bilirubin elevated at 1.8   No constitutional symptoms   Appetite fair - poor fluid intake   discussed healthy fluid intake   repeat labs in 2 weeks          Medications, treatments, and labs reviewed  Continue medications and treatments as listed in EMR    BLAS Bradley      Electronically Signed By: BLAS Bradley   5/30/24 12:34 AM

## 2024-05-30 PROBLEM — R79.89 ABNORMAL BILIRUBIN TEST: Status: ACTIVE | Noted: 2024-05-30

## 2024-05-30 NOTE — ASSESSMENT & PLAN NOTE
Recent lab work WNL   Discussed depression with patient   Patient endorses not enjoying certain activities anymore, withdrew from therapy, prefers to stay in her room by herself, decrease in appetite   No SI HI   Escitalopram   Psychiatry consult

## 2024-05-30 NOTE — PROGRESS NOTES
PROGRESS NOTE    Subjective   Chief complaint: Ermelinda Benoit is a 89 y.o. female who is an assisted living facility patient being seen and evaluated for  abnormal lab results    HPI:  Patient seen and evaluated for abnormal lab results.  Bili elevated at 1.8.  Patient offers no complaints of abdominal pain, N/V/D/F/C.  Appetite fair.  Tolerating sips of water throughout day.  A-fib stable - no SOB, chest pain.  BP at goal.  No concerns per nursing      Objective   Vital signs: 123/74-81-16    Physical Exam  Constitutional:       Appearance: Normal appearance.   HENT:      Head: Normocephalic.      Mouth/Throat:      Mouth: Mucous membranes are moist.   Eyes:      Extraocular Movements: Extraocular movements intact.   Cardiovascular:      Rate and Rhythm: Normal rate. Rhythm irregular.      Pulses: Normal pulses.      Heart sounds: Murmur heard.   Pulmonary:      Effort: Pulmonary effort is normal.      Breath sounds: Normal breath sounds.   Abdominal:      General: Bowel sounds are normal.      Palpations: Abdomen is soft.   Musculoskeletal:         General: Normal range of motion.      Cervical back: Normal range of motion and neck supple.      Comments:  weakness   Skin:     General: Skin is warm and dry.      Capillary Refill: Capillary refill takes less than 2 seconds.   Neurological:      Mental Status: She is alert. Mental status is at baseline.   Psychiatric:         Mood and Affect: Mood normal.         Behavior: Behavior normal.         Assessment/Plan   Problem List Items Addressed This Visit       CHF (congestive heart failure) (Multi)      Stable   no SOB, rales   trace edema   compression hose and elevation   Torsemide   monitor weight   monitor         Controlled atrial fibrillation (Multi)      Stable   no SOB, dizziness, palpitations   rate controlled   Eliquis   bleeding precautions   monitor           Fatigue due to depression      Recent lab work WNL   Discussed depression with patient    Patient endorses not enjoying certain activities anymore, withdrew from therapy, prefers to stay in her room by herself, decrease in appetite   No SI HI   Escitalopram   Psychiatry consult           Abnormal bilirubin test - Primary      bilirubin elevated at 1.8   No constitutional symptoms   Appetite fair - poor fluid intake   discussed healthy fluid intake   repeat labs in 2 weeks          Medications, treatments, and labs reviewed  Continue medications and treatments as listed in EMR    Ashlee Nicholas, APRN-CNP

## 2024-05-30 NOTE — ASSESSMENT & PLAN NOTE
bilirubin elevated at 1.8   No constitutional symptoms   Appetite fair - poor fluid intake   discussed healthy fluid intake   repeat labs in 2 weeks

## 2024-06-07 ENCOUNTER — APPOINTMENT (OUTPATIENT)
Dept: CARDIOLOGY | Facility: CLINIC | Age: 89
End: 2024-06-07
Payer: MEDICARE

## 2024-06-18 ENCOUNTER — NURSING HOME VISIT (OUTPATIENT)
Dept: POST ACUTE CARE | Facility: EXTERNAL LOCATION | Age: 89
End: 2024-06-18
Payer: MEDICARE

## 2024-06-18 ENCOUNTER — TELEPHONE (OUTPATIENT)
Dept: CARDIOLOGY | Facility: CLINIC | Age: 89
End: 2024-06-18
Payer: MEDICARE

## 2024-06-18 DIAGNOSIS — F32.0 CURRENT MILD EPISODE OF MAJOR DEPRESSIVE DISORDER WITHOUT PRIOR EPISODE (CMS-HCC): ICD-10-CM

## 2024-06-18 DIAGNOSIS — I48.91 CONTROLLED ATRIAL FIBRILLATION (MULTI): ICD-10-CM

## 2024-06-18 DIAGNOSIS — I50.9 CONGESTIVE HEART FAILURE, UNSPECIFIED HF CHRONICITY, UNSPECIFIED HEART FAILURE TYPE (MULTI): ICD-10-CM

## 2024-06-18 DIAGNOSIS — R42 DIZZINESS: ICD-10-CM

## 2024-06-18 DIAGNOSIS — J44.9 CHRONIC OBSTRUCTIVE PULMONARY DISEASE, UNSPECIFIED COPD TYPE (MULTI): ICD-10-CM

## 2024-06-18 DIAGNOSIS — R53.1 WEAKNESS: ICD-10-CM

## 2024-06-18 DIAGNOSIS — I10 HYPERTENSION, ESSENTIAL: ICD-10-CM

## 2024-06-18 DIAGNOSIS — R26.2 AMBULATORY DYSFUNCTION: Primary | ICD-10-CM

## 2024-06-18 PROCEDURE — 99348 HOME/RES VST EST LOW MDM 30: CPT

## 2024-06-18 NOTE — TELEPHONE ENCOUNTER
Requesting we fax her mothers chest X-ray and CT scan to [facility].  Fax number [phone].  Faxed.  Called and left message for daughter that they have been faxed.

## 2024-06-18 NOTE — LETTER
Patient: Ermelinda Benoit  : 3/14/1933    Encounter Date: 2024    PROGRESS NOTE    Subjective  Chief complaint: Ermelinda Benoit is a 89 y.o. female who is an assisted living facility patient being seen and evaluated for general medical care and monthly follow-up    HPI:  Patient seen and evaluated for general medical care monthly follow-up.  Patient at baseline mentation, cooperative, pleasant.  Converses easily. Complains of ongoing dizziness.  A-fib stable - no SOB, dizziness, palpitations.  COPD stable - no cough, wheeze.  Continues to work with therapy for strengthening and mobility.  Appetite good.  Sleeping well.  Engages in activities with other residents.  No concerns per nursing      Objective  Vital signs: 146/93-97.3-62-18-95%    Physical Exam  Constitutional:       Appearance: Normal appearance.   HENT:      Head: Normocephalic.      Mouth/Throat:      Mouth: Mucous membranes are moist.   Eyes:      Extraocular Movements: Extraocular movements intact.   Cardiovascular:      Rate and Rhythm: Normal rate. Rhythm irregular.      Pulses: Normal pulses.      Heart sounds: Murmur heard.   Pulmonary:      Effort: Pulmonary effort is normal.      Breath sounds: Normal breath sounds.   Abdominal:      General: Bowel sounds are normal.      Palpations: Abdomen is soft.   Musculoskeletal:         General: Normal range of motion.      Cervical back: Normal range of motion and neck supple.      Right lower leg: Edema present.      Left lower leg: Edema present.      Comments: weakness   Skin:     General: Skin is warm and dry.      Capillary Refill: Capillary refill takes less than 2 seconds.   Neurological:      Mental Status: She is alert. Mental status is at baseline.   Psychiatric:         Mood and Affect: Mood normal.         Behavior: Behavior normal.         Assessment/Plan  Problem List Items Addressed This Visit       CHF (congestive heart failure) (Multi)      Stable   no SOB, rales   trace  edema   compression hose and elevation   Torsemide   monitor weight   monitor         Weakness      walker for ambulation   wheelchair for distance   Reinforced calling for assistance when needed         COPD (chronic obstructive pulmonary disease) (Multi)      Stable   no SOB, cough, wheeze   DuoNebs   monitor         Hypertension, essential      Stable   BP within goal   losartan Torsemide   monitor         Ambulatory dysfunction - Primary      ambulate with walker assist due to weakness   Patient to use wheelchair for distance         Controlled atrial fibrillation (Multi)      Stable   no SOB, dizziness, palpitations   rate controlled   Eliquis   bleeding precautions   monitor           Dizziness      Continues to complain of dizziness - not related to position changes   Patient insists dizziness is related to all of her medications that she takes   encourage p.o. fluid intake   Reinforced calling for assistance when changing positions to avoid falls         Current mild episode of major depressive disorder (CMS-HCC)      Lexapro   monitor behaviors   monitor for changes in appetite or sleeping pattern          Medications, treatments, and labs reviewed  Continue medications and treatments as listed in EMR    BLAS Bradley      Electronically Signed By: BLAS Bradley   6/30/24  2:47 PM

## 2024-06-19 ENCOUNTER — APPOINTMENT (OUTPATIENT)
Dept: CARDIOLOGY | Facility: CLINIC | Age: 89
End: 2024-06-19
Payer: MEDICARE

## 2024-06-30 NOTE — PROGRESS NOTES
PROGRESS NOTE    Subjective   Chief complaint: Ermelnida Benoit is a 89 y.o. female who is an assisted living facility patient being seen and evaluated for general medical care and monthly follow-up    HPI:  Patient seen and evaluated for general medical care monthly follow-up.  Patient at baseline mentation, cooperative, pleasant.  Converses easily. Complains of ongoing dizziness.  A-fib stable - no SOB, dizziness, palpitations.  COPD stable - no cough, wheeze.  Continues to work with therapy for strengthening and mobility.  Appetite good.  Sleeping well.  Engages in activities with other residents.  No concerns per nursing      Objective   Vital signs: 146/93-97.3-62-18-95%    Physical Exam  Constitutional:       Appearance: Normal appearance.   HENT:      Head: Normocephalic.      Mouth/Throat:      Mouth: Mucous membranes are moist.   Eyes:      Extraocular Movements: Extraocular movements intact.   Cardiovascular:      Rate and Rhythm: Normal rate. Rhythm irregular.      Pulses: Normal pulses.      Heart sounds: Murmur heard.   Pulmonary:      Effort: Pulmonary effort is normal.      Breath sounds: Normal breath sounds.   Abdominal:      General: Bowel sounds are normal.      Palpations: Abdomen is soft.   Musculoskeletal:         General: Normal range of motion.      Cervical back: Normal range of motion and neck supple.      Right lower leg: Edema present.      Left lower leg: Edema present.      Comments: weakness   Skin:     General: Skin is warm and dry.      Capillary Refill: Capillary refill takes less than 2 seconds.   Neurological:      Mental Status: She is alert. Mental status is at baseline.   Psychiatric:         Mood and Affect: Mood normal.         Behavior: Behavior normal.         Assessment/Plan   Problem List Items Addressed This Visit       CHF (congestive heart failure) (Multi)      Stable   no SOB, rales   trace edema   compression hose and elevation   Torsemide   monitor weight    monitor         Weakness      walker for ambulation   wheelchair for distance   Reinforced calling for assistance when needed         COPD (chronic obstructive pulmonary disease) (Multi)      Stable   no SOB, cough, wheeze   DuoNebs   monitor         Hypertension, essential      Stable   BP within goal   losartan Torsemide   monitor         Ambulatory dysfunction - Primary      ambulate with walker assist due to weakness   Patient to use wheelchair for distance         Controlled atrial fibrillation (Multi)      Stable   no SOB, dizziness, palpitations   rate controlled   Eliquis   bleeding precautions   monitor           Dizziness      Continues to complain of dizziness - not related to position changes   Patient insists dizziness is related to all of her medications that she takes   encourage p.o. fluid intake   Reinforced calling for assistance when changing positions to avoid falls         Current mild episode of major depressive disorder (CMS-HCC)      Lexapro   monitor behaviors   monitor for changes in appetite or sleeping pattern          Medications, treatments, and labs reviewed  Continue medications and treatments as listed in EMR    Ashlee Nicholas, APRN-CNP

## 2024-07-12 ENCOUNTER — NURSING HOME VISIT (OUTPATIENT)
Dept: POST ACUTE CARE | Facility: EXTERNAL LOCATION | Age: 89
End: 2024-07-12
Payer: MEDICARE

## 2024-07-12 DIAGNOSIS — J44.9 CHRONIC OBSTRUCTIVE PULMONARY DISEASE, UNSPECIFIED COPD TYPE (MULTI): ICD-10-CM

## 2024-07-12 DIAGNOSIS — F32.0 CURRENT MILD EPISODE OF MAJOR DEPRESSIVE DISORDER WITHOUT PRIOR EPISODE (CMS-HCC): ICD-10-CM

## 2024-07-12 DIAGNOSIS — I50.9 CONGESTIVE HEART FAILURE, UNSPECIFIED HF CHRONICITY, UNSPECIFIED HEART FAILURE TYPE (MULTI): Primary | ICD-10-CM

## 2024-07-12 DIAGNOSIS — R42 DIZZINESS: ICD-10-CM

## 2024-07-12 DIAGNOSIS — I48.91 CONTROLLED ATRIAL FIBRILLATION (MULTI): ICD-10-CM

## 2024-07-12 DIAGNOSIS — I10 HYPERTENSION, ESSENTIAL: ICD-10-CM

## 2024-07-12 DIAGNOSIS — R53.1 WEAKNESS: ICD-10-CM

## 2024-07-12 NOTE — LETTER
Patient: Ermelinda Benoit  : 3/14/1933    Encounter Date: 2024    PROGRESS NOTE    Subjective  Chief complaint: Ermelinda Benoit is a 89 y.o. female who is an assisted living facility patient being seen and evaluated for  general medical care and monthly follow-up    HPI:  Patient seen and evaluated for general medical care and monthly follow-up.  Patient at baseline mentation, cooperative, pleasant.  Converses easily.  Continues to complain of dizziness. A-fib stable- rate controlled - no SOB, dizziness, palpitations.  Appetite good.  Sleeping well.  Engages in activities with other residents.  No concerns per nursing      Objective  Vital signs:  119/68-67-18    Physical Exam  Constitutional:       Appearance: Normal appearance.   HENT:      Head: Normocephalic.      Mouth/Throat:      Mouth: Mucous membranes are moist.   Eyes:      Extraocular Movements: Extraocular movements intact.   Cardiovascular:      Rate and Rhythm: Normal rate. Rhythm irregular.      Pulses: Normal pulses.      Heart sounds: Murmur heard.   Pulmonary:      Effort: Pulmonary effort is normal.      Breath sounds: Normal breath sounds.   Abdominal:      General: Bowel sounds are normal.      Palpations: Abdomen is soft.   Musculoskeletal:         General: Normal range of motion.      Cervical back: Normal range of motion and neck supple.      Comments:  weakness   Skin:     General: Skin is warm and dry.      Capillary Refill: Capillary refill takes less than 2 seconds.   Neurological:      Mental Status: She is alert. Mental status is at baseline.   Psychiatric:         Mood and Affect: Mood normal.         Behavior: Behavior normal.         Assessment/Plan  Problem List Items Addressed This Visit       CHF (congestive heart failure) (Multi) - Primary      Stable   no SOB, rales   trace edema   compression hose and elevation   Torsemide   monitor weight   monitor         Weakness      walker for ambulation   wheelchair for  distance   Reinforced calling for assistance when needed         COPD (chronic obstructive pulmonary disease) (Multi)      Stable   no SOB, cough, wheeze   DuoNebs   monitor         Hypertension, essential      Stable   BP within goal   losartan Torsemide   monitor         Controlled atrial fibrillation (Multi)      Stable   no SOB, dizziness, palpitations   rate controlled   Eliquis   bleeding precautions   monitor           Dizziness      Continues to complain of dizziness - not related to position changes   Patient insists dizziness is related to all of her medications that she takes   encourage p.o. fluid intake   Reinforced calling for assistance when changing positions to avoid falls         Current mild episode of major depressive disorder (CMS-HCC)      Lexapro   monitor behaviors   monitor for changes in appetite or sleeping pattern          Medications, treatments, and labs reviewed  Continue medications and treatments as listed in EMR    BLAS Bradley      Electronically Signed By: BLAS Bradley   7/13/24 10:43 PM

## 2024-07-14 NOTE — PROGRESS NOTES
PROGRESS NOTE    Subjective   Chief complaint: Ermelinda Benoit is a 89 y.o. female who is an assisted living facility patient being seen and evaluated for  general medical care and monthly follow-up    HPI:  Patient seen and evaluated for general medical care and monthly follow-up.  Patient at baseline mentation, cooperative, pleasant.  Converses easily.  Continues to complain of dizziness. A-fib stable- rate controlled - no SOB, dizziness, palpitations.  Appetite good.  Sleeping well.  Engages in activities with other residents.  No concerns per nursing      Objective   Vital signs:  119/68-67-18    Physical Exam  Constitutional:       Appearance: Normal appearance.   HENT:      Head: Normocephalic.      Mouth/Throat:      Mouth: Mucous membranes are moist.   Eyes:      Extraocular Movements: Extraocular movements intact.   Cardiovascular:      Rate and Rhythm: Normal rate. Rhythm irregular.      Pulses: Normal pulses.      Heart sounds: Murmur heard.   Pulmonary:      Effort: Pulmonary effort is normal.      Breath sounds: Normal breath sounds.   Abdominal:      General: Bowel sounds are normal.      Palpations: Abdomen is soft.   Musculoskeletal:         General: Normal range of motion.      Cervical back: Normal range of motion and neck supple.      Comments:  weakness   Skin:     General: Skin is warm and dry.      Capillary Refill: Capillary refill takes less than 2 seconds.   Neurological:      Mental Status: She is alert. Mental status is at baseline.   Psychiatric:         Mood and Affect: Mood normal.         Behavior: Behavior normal.         Assessment/Plan   Problem List Items Addressed This Visit       CHF (congestive heart failure) (Multi) - Primary      Stable   no SOB, rales   trace edema   compression hose and elevation   Torsemide   monitor weight   monitor         Weakness      walker for ambulation   wheelchair for distance   Reinforced calling for assistance when needed         COPD (chronic  obstructive pulmonary disease) (Multi)      Stable   no SOB, cough, wheeze   DuoNebs   monitor         Hypertension, essential      Stable   BP within goal   losartan Torsemide   monitor         Controlled atrial fibrillation (Multi)      Stable   no SOB, dizziness, palpitations   rate controlled   Eliquis   bleeding precautions   monitor           Dizziness      Continues to complain of dizziness - not related to position changes   Patient insists dizziness is related to all of her medications that she takes   encourage p.o. fluid intake   Reinforced calling for assistance when changing positions to avoid falls         Current mild episode of major depressive disorder (CMS-HCC)      Lexapro   monitor behaviors   monitor for changes in appetite or sleeping pattern          Medications, treatments, and labs reviewed  Continue medications and treatments as listed in EMR    Ashlee Nicholas, APRN-CNP

## 2024-07-22 PROBLEM — E78.5 HYPERLIPIDEMIA, UNSPECIFIED: Status: RESOLVED | Noted: 2023-07-13 | Resolved: 2024-07-22

## 2024-07-22 PROBLEM — I48.91 CONTROLLED ATRIAL FIBRILLATION (MULTI): Status: RESOLVED | Noted: 2023-10-25 | Resolved: 2024-07-22

## 2024-07-22 PROBLEM — I50.9 CHF (CONGESTIVE HEART FAILURE) (MULTI): Status: RESOLVED | Noted: 2023-06-05 | Resolved: 2024-07-22

## 2024-07-22 PROBLEM — I11.9 HYPERTENSION WITH HEART DISEASE: Status: RESOLVED | Noted: 2023-10-25 | Resolved: 2024-07-22

## 2024-08-01 ENCOUNTER — TELEPHONE (OUTPATIENT)
Dept: CARDIOLOGY | Facility: CLINIC | Age: 89
End: 2024-08-01
Payer: MEDICARE

## 2024-08-01 NOTE — TELEPHONE ENCOUNTER
"Pt's dtr called asking if 8/7/24 is needed? Per dtr - pt is feeling better. Echo already reviewed by Dr. Hoffman on 5/22/24 -   \"Please notify patient or daughter that echo findings are stable.\"    Secure chat to Dr. Hoffman. Per Dr. Hoffman: Ok for patient to follow up in 6 months.    Dtr aware. Appt rescheduled for 12/4/24.  "

## 2024-08-07 ENCOUNTER — APPOINTMENT (OUTPATIENT)
Dept: CARDIOLOGY | Facility: CLINIC | Age: 89
End: 2024-08-07
Payer: MEDICARE

## 2024-08-09 ENCOUNTER — NURSING HOME VISIT (OUTPATIENT)
Dept: POST ACUTE CARE | Facility: EXTERNAL LOCATION | Age: 89
End: 2024-08-09
Payer: MEDICARE

## 2024-08-09 DIAGNOSIS — F32.0 CURRENT MILD EPISODE OF MAJOR DEPRESSIVE DISORDER WITHOUT PRIOR EPISODE (CMS-HCC): ICD-10-CM

## 2024-08-09 DIAGNOSIS — J44.9 CHRONIC OBSTRUCTIVE PULMONARY DISEASE, UNSPECIFIED COPD TYPE (MULTI): ICD-10-CM

## 2024-08-09 DIAGNOSIS — I10 HYPERTENSION, ESSENTIAL: ICD-10-CM

## 2024-08-09 DIAGNOSIS — I48.11 LONGSTANDING PERSISTENT ATRIAL FIBRILLATION (MULTI): ICD-10-CM

## 2024-08-09 DIAGNOSIS — R63.5 WEIGHT GAIN: Primary | ICD-10-CM

## 2024-08-09 DIAGNOSIS — R60.0 LEG EDEMA: ICD-10-CM

## 2024-08-09 DIAGNOSIS — R53.83 OTHER FATIGUE: ICD-10-CM

## 2024-08-09 PROCEDURE — 99348 HOME/RES VST EST LOW MDM 30: CPT

## 2024-08-09 NOTE — LETTER
Patient: Ermelinda Benoit  : 3/14/1933    Encounter Date: 2024    PROGRESS NOTE    Subjective  Chief complaint: Ermelinda Benoit is a 89 y.o. female who is an assisted living facility patient being seen and evaluated for general medical care and monthly follow-up.    HPI:  Patient seen and evaluated for general medical care and monthly follow-up.  Patient at baseline mentation, cooperative and pleasant.  Patient complains of fatigue and weight gain. .  Endorses sleeping well at nighttime but he has daytime fatigue for few days.   Also states legs feel tight.  COPD stable - no SOB, cough, wheeze.  Patient anxious with evaluation- family present.  Patient denies F/C/N/V/D.  No concerns per nursing.          Objective  Vital signs: 137/ 70-97.5-66-14-95%    Physical Exam  Constitutional:       Appearance: Normal appearance.   HENT:      Head: Normocephalic.      Mouth/Throat:      Mouth: Mucous membranes are moist.   Eyes:      Extraocular Movements: Extraocular movements intact.   Cardiovascular:      Rate and Rhythm: Normal rate. Rhythm irregular.      Pulses: Normal pulses.      Heart sounds: Murmur heard.   Pulmonary:      Effort: Pulmonary effort is normal.      Breath sounds: Normal breath sounds.   Abdominal:      General: Bowel sounds are normal.      Palpations: Abdomen is soft.   Musculoskeletal:         General: Normal range of motion.      Cervical back: Normal range of motion and neck supple.      Right lower leg: Edema present.      Left lower leg: Edema present.      Comments:  weakness   Skin:     General: Skin is warm and dry.      Capillary Refill: Capillary refill takes less than 2 seconds.   Neurological:      Mental Status: She is alert. Mental status is at baseline.   Psychiatric:         Mood and Affect: Mood normal.         Behavior: Behavior normal.         Assessment/Plan  Problem List Items Addressed This Visit       Leg edema      Stable   elevate compression hose   Torsemide  increased x3 days   monitor         COPD (chronic obstructive pulmonary disease) (Multi)      Stable   no SOB, cough, wheeze   DuoNebs   monitor         Hypertension, essential      Stable   BP within goal   losartan Torsemide   monitor         Longstanding persistent atrial fibrillation (Multi)      Stable    rate controlled   no SOB, palpitations, fatigue, pain   Eliquis   bleeding precautions   monitor         Current mild episode of major depressive disorder (CMS-[facility])      Lexapro   monitor behaviors   monitor for changes in appetite or sleeping pattern         Weight gain - Primary     Increase torsemide to 40 mg for 3 days         Other fatigue      fatigue for few days   Endorses sleeping well at night   Monitor          Medications, treatments, and labs reviewed  Continue medications and treatments as listed in EMR    Scribe Attestation  I, Nancy Sosa   attest that this documentation has been prepared under the direction and in the presence of BLAS Bradley    Provider Attestation - Scribe documentation  All medical record entries made by the Scribe were at my direction and personally dictated by me. I have reviewed the chart and agree that the record accurately reflects my personal performance of the history, physical exam, discussion and plan.   BLAS Bradley      Electronically Signed By: BLAS Bradley   8/10/24  8:59 PM

## 2024-08-10 PROBLEM — R23.9 ALTERATION IN SKIN INTEGRITY: Status: ACTIVE | Noted: 2024-08-10

## 2024-08-10 PROBLEM — R63.5 WEIGHT GAIN: Status: ACTIVE | Noted: 2024-08-10

## 2024-08-10 PROBLEM — R53.83 OTHER FATIGUE: Status: ACTIVE | Noted: 2024-08-10

## 2024-08-10 NOTE — PROGRESS NOTES
PROGRESS NOTE    Subjective   Chief complaint: Ermelinda Benoit is a 89 y.o. female who is an assisted living facility patient being seen and evaluated for general medical care and monthly follow-up.    HPI:  Patient seen and evaluated for general medical care and monthly follow-up.  Patient at baseline mentation, cooperative and pleasant.  Patient complains of fatigue and weight gain. .  Endorses sleeping well at nighttime but he has daytime fatigue for few days.   Also states legs feel tight.  COPD stable - no SOB, cough, wheeze.  Patient anxious with evaluation- family present.  Patient denies F/C/N/V/D.  No concerns per nursing.          Objective   Vital signs: 137/ 70-97.5-66-14-95%    Physical Exam  Constitutional:       Appearance: Normal appearance.   HENT:      Head: Normocephalic.      Mouth/Throat:      Mouth: Mucous membranes are moist.   Eyes:      Extraocular Movements: Extraocular movements intact.   Cardiovascular:      Rate and Rhythm: Normal rate. Rhythm irregular.      Pulses: Normal pulses.      Heart sounds: Murmur heard.   Pulmonary:      Effort: Pulmonary effort is normal.      Breath sounds: Normal breath sounds.   Abdominal:      General: Bowel sounds are normal.      Palpations: Abdomen is soft.   Musculoskeletal:         General: Normal range of motion.      Cervical back: Normal range of motion and neck supple.      Right lower leg: Edema present.      Left lower leg: Edema present.      Comments:  weakness   Skin:     General: Skin is warm and dry.      Capillary Refill: Capillary refill takes less than 2 seconds.   Neurological:      Mental Status: She is alert. Mental status is at baseline.   Psychiatric:         Mood and Affect: Mood normal.         Behavior: Behavior normal.         Assessment/Plan   Problem List Items Addressed This Visit       Leg edema      Stable   elevate compression hose   Torsemide increased x3 days   monitor         COPD (chronic obstructive pulmonary  disease) (Multi)      Stable   no SOB, cough, wheeze   DuoNebs   monitor         Hypertension, essential      Stable   BP within goal   losartan Torsemide   monitor         Longstanding persistent atrial fibrillation (Multi)      Stable    rate controlled   no SOB, palpitations, fatigue, pain   Eliquis   bleeding precautions   monitor         Current mild episode of major depressive disorder (CMS-HCC)      Lexapro   monitor behaviors   monitor for changes in appetite or sleeping pattern         Weight gain - Primary     Increase torsemide to 40 mg for 3 days         Other fatigue      fatigue for few days   Endorses sleeping well at night   Monitor          Medications, treatments, and labs reviewed  Continue medications and treatments as listed in EMR    Scribe Attestation  I, Nancy Sosa   attest that this documentation has been prepared under the direction and in the presence of BLAS Bradley    Provider Attestation - Scribe documentation  All medical record entries made by the Scribe were at my direction and personally dictated by me. I have reviewed the chart and agree that the record accurately reflects my personal performance of the history, physical exam, discussion and plan.   BLAS Bradley

## 2024-08-13 ENCOUNTER — NURSING HOME VISIT (OUTPATIENT)
Dept: POST ACUTE CARE | Facility: EXTERNAL LOCATION | Age: 89
End: 2024-08-13
Payer: MEDICARE

## 2024-08-13 DIAGNOSIS — I50.32 CHRONIC DIASTOLIC CONGESTIVE HEART FAILURE (MULTI): ICD-10-CM

## 2024-08-13 DIAGNOSIS — F32.0 CURRENT MILD EPISODE OF MAJOR DEPRESSIVE DISORDER WITHOUT PRIOR EPISODE (CMS-HCC): Primary | ICD-10-CM

## 2024-08-13 DIAGNOSIS — R63.5 WEIGHT GAIN: ICD-10-CM

## 2024-08-13 DIAGNOSIS — R60.0 LEG EDEMA: ICD-10-CM

## 2024-08-13 DIAGNOSIS — I10 HYPERTENSION, ESSENTIAL: ICD-10-CM

## 2024-08-13 DIAGNOSIS — I48.21 PERMANENT ATRIAL FIBRILLATION (MULTI): ICD-10-CM

## 2024-08-13 PROCEDURE — 99348 HOME/RES VST EST LOW MDM 30: CPT

## 2024-08-13 NOTE — LETTER
Patient: Ermelinda Benoit  : 3/14/1933    Encounter Date: 2024    PROGRESS NOTE    Subjective  Chief complaint: Ermelinda Benoit is a 89 y.o. female who is an assisted living facility patient being seen and evaluated for  General Medical care monthly follow-up    HPI:  Patient seen and evaluated for follow-up on weight gain.  Torsemide was increased on .  Patient offers no complaints of SOB.  Continues on daily weights.  BP within goal.  Mood stable - engages with other community members.  No other concerns per nursing      Objective  Vital signs:  122/62, 52, 18    Physical Exam  Constitutional:       Appearance: Normal appearance.   HENT:      Head: Normocephalic.      Mouth/Throat:      Mouth: Mucous membranes are moist.   Eyes:      Extraocular Movements: Extraocular movements intact.   Cardiovascular:      Rate and Rhythm: Normal rate. Rhythm irregular.      Pulses: Normal pulses.      Heart sounds: Murmur heard.   Pulmonary:      Effort: Pulmonary effort is normal.      Breath sounds: Normal breath sounds.   Abdominal:      General: Bowel sounds are normal.      Palpations: Abdomen is soft.   Musculoskeletal:         General: Normal range of motion.      Cervical back: Normal range of motion and neck supple.      Right lower leg: Edema present.      Left lower leg: Edema present.      Comments:  Weakness-.  Trace edema   Skin:     General: Skin is warm and dry.      Capillary Refill: Capillary refill takes less than 2 seconds.   Neurological:      Mental Status: She is alert. Mental status is at baseline.   Psychiatric:         Mood and Affect: Mood normal.         Behavior: Behavior normal.         Assessment/Plan  Problem List Items Addressed This Visit       Leg edema      Stable   elevate compression hose   Torsemide    monitor         Hypertension, essential      Stable   BP within goal   losartan Torsemide   monitor         Chronic diastolic congestive heart failure (Multi)      continue  torsemide   control BP- continue Cozaar   monitor weights   LOKI diet         Permanent atrial fibrillation (Multi)      Stable   rate controlled   Eliquis   bleeding precautions   monitor         Current mild episode of major depressive disorder (CMS-HCC) - Primary      Lexapro   monitor behaviors   monitor for changes in appetite or sleeping pattern         Weight gain     weight gain with BLE trace edema and SOB  Torsemide increased for 3 days          Medications, treatments, and labs reviewed  Continue medications and treatments as listed in EMR    BLAS Bradley      Electronically Signed By: BLAS Bradley   8/17/24  5:20 PM

## 2024-08-17 NOTE — PROGRESS NOTES
PROGRESS NOTE    Subjective   Chief complaint: Ermelinda Benoit is a 89 y.o. female who is an assisted living facility patient being seen and evaluated for general medical care and monthly follow-up    HPI:  Patient seen and evaluated for general medical care and monthly follow-up. .  Patient at baseline mentation, pleasant, cooperative.  Offers no complaints at time.  Nursing noted weight gain- Torsemide was increased on 8/9.  Patient offers no complaints of SOB.  Continues on daily weights.  BP within goal.  Mood stable - engages with other community members.  Appetite fair.  Sleeping well. No other concerns per nursing      Objective   Vital signs:  122/62, 52, 18    Physical Exam  Constitutional:       Appearance: Normal appearance.   HENT:      Head: Normocephalic.      Mouth/Throat:      Mouth: Mucous membranes are moist.   Eyes:      Extraocular Movements: Extraocular movements intact.   Cardiovascular:      Rate and Rhythm: Normal rate. Rhythm irregular.      Pulses: Normal pulses.      Heart sounds: Murmur heard.   Pulmonary:      Effort: Pulmonary effort is normal.      Breath sounds: Normal breath sounds.   Abdominal:      General: Bowel sounds are normal.      Palpations: Abdomen is soft.   Musculoskeletal:         General: Normal range of motion.      Cervical back: Normal range of motion and neck supple.      Right lower leg: Edema present.      Left lower leg: Edema present.      Comments:  Weakness-.  Trace edema   Skin:     General: Skin is warm and dry.      Capillary Refill: Capillary refill takes less than 2 seconds.   Neurological:      Mental Status: She is alert. Mental status is at baseline.   Psychiatric:         Mood and Affect: Mood normal.         Behavior: Behavior normal.         Assessment/Plan   Problem List Items Addressed This Visit       Leg edema      Stable   elevate compression hose   Torsemide    monitor         Hypertension, essential      Stable   BP within goal   losartan  Torsemide   monitor         Chronic diastolic congestive heart failure (Multi)      continue torsemide   control BP- continue Cozaar   monitor weights   LOKI diet         Permanent atrial fibrillation (Multi)      Stable   rate controlled   Eliquis   bleeding precautions   monitor         Current mild episode of major depressive disorder (CMS-HCC) - Primary      Lexapro   monitor behaviors   monitor for changes in appetite or sleeping pattern         Weight gain     weight gain with BLE trace edema and SOB  Torsemide increased for 3 days          Medications, treatments, and labs reviewed  Continue medications and treatments as listed in EMR    Ashlee Nicholas, APRN-CNP

## 2024-08-22 ENCOUNTER — TELEPHONE (OUTPATIENT)
Dept: CARDIOLOGY | Facility: CLINIC | Age: 89
End: 2024-08-22
Payer: MEDICARE

## 2024-08-22 NOTE — TELEPHONE ENCOUNTER
Pt's dtr called stating [facility] NP is increasing patient's Torsemide to 40mg daily d/t edema & SOB, along with a CXR. Alexus wanted to make sure Dr. Hoffman is ok with this. Last lab results faxed from [facility] Last BUN/CR (7/26/24) 36/0.9 Secure chat sent to Dr. Hoffman. Per Dr. Hoffman: that's fine. repeat labs in 2 weeks     Spoke to pt's dtr and made aware.

## 2024-08-23 DIAGNOSIS — I48.91 ATRIAL FIBRILLATION, UNSPECIFIED TYPE (MULTI): ICD-10-CM

## 2024-08-27 ENCOUNTER — NURSING HOME VISIT (OUTPATIENT)
Dept: POST ACUTE CARE | Facility: EXTERNAL LOCATION | Age: 89
End: 2024-08-27
Payer: MEDICARE

## 2024-08-27 DIAGNOSIS — R60.0 LEG EDEMA: ICD-10-CM

## 2024-08-27 DIAGNOSIS — F33.0 MILD EPISODE OF RECURRENT MAJOR DEPRESSIVE DISORDER (CMS-HCC): ICD-10-CM

## 2024-08-27 DIAGNOSIS — I48.21 PERMANENT ATRIAL FIBRILLATION (MULTI): ICD-10-CM

## 2024-08-27 DIAGNOSIS — I50.32 CHRONIC DIASTOLIC CONGESTIVE HEART FAILURE (MULTI): Primary | ICD-10-CM

## 2024-08-27 PROCEDURE — 99347 HOME/RES VST EST SF MDM 20: CPT

## 2024-08-27 NOTE — LETTER
Patient: Ermelinda Benoit  : 3/14/1933    Encounter Date: 2024    PROGRESS NOTE    Subjective  Chief complaint: Ermelinda Benoit is a 89 y.o. female who is an assisted living facility patient being seen and evaluated for follow-up on sob    HPI:  Patient seen and evaluated for follow-up on SOB.  Patient states doing much better.  Significantly less anxious than on last visit.  Respirations easy, nonlabored.  Offers no complaints.  Weight 113#- near baseline.  BLE edema significantly improved.  Appetite good.  Sleeping well. Spoke with family regarding patient's status and condition. No concerns per nursing.      Objective  Vital signs:  122/67, 53, 16    Physical Exam  Constitutional:       Appearance: Normal appearance.   HENT:      Head: Normocephalic.      Mouth/Throat:      Mouth: Mucous membranes are moist.   Eyes:      Extraocular Movements: Extraocular movements intact.   Cardiovascular:      Rate and Rhythm: Normal rate. Rhythm irregular.      Pulses: Normal pulses.      Heart sounds: Murmur heard.   Pulmonary:      Effort: Pulmonary effort is normal.      Breath sounds: Normal breath sounds.   Abdominal:      General: Bowel sounds are normal.      Palpations: Abdomen is soft.   Musculoskeletal:         General: Normal range of motion.      Cervical back: Normal range of motion and neck supple.      Right lower leg: Edema present.      Left lower leg: Edema present.      Comments:  Weakness-.  Trace edema   Skin:     General: Skin is warm and dry.      Capillary Refill: Capillary refill takes less than 2 seconds.   Neurological:      Mental Status: She is alert. Mental status is at baseline.   Psychiatric:         Mood and Affect: Mood normal.         Behavior: Behavior normal.         Assessment/Plan  Problem List Items Addressed This Visit       Leg edema      Improved   elevate compression hose   Torsemide    monitor         Chronic diastolic congestive heart failure (Multi) - Primary       continue torsemide   control BP- continue Cozaar   monitor weights   LOKI diet         Permanent atrial fibrillation (Multi)      Stable   rate controlled   Eliquis   bleeding precautions   monitor         Current mild episode of major depressive disorder (CMS-HCC)      Improved mood   Lexapro   monitor behaviors   monitor for changes in appetite or sleeping pattern          Medications, treatments, and labs reviewed  Continue medications and treatments as listed in EMR    BLAS Bradley      Electronically Signed By: BLAS Bradley   8/30/24  7:56 PM

## 2024-08-28 DIAGNOSIS — I50.9 CONGESTIVE HEART FAILURE, UNSPECIFIED HF CHRONICITY, UNSPECIFIED HEART FAILURE TYPE (MULTI): Primary | ICD-10-CM

## 2024-08-29 RX ORDER — TORSEMIDE 20 MG/1
30 TABLET ORAL DAILY
Qty: 135 TABLET | Refills: 1 | Status: SHIPPED | OUTPATIENT
Start: 2024-08-29

## 2024-08-30 NOTE — PROGRESS NOTES
PROGRESS NOTE    Subjective   Chief complaint: Ermelinda Benoit is a 89 y.o. female who is an assisted living facility patient being seen and evaluated for follow-up on sob    HPI:  Patient seen and evaluated for follow-up on SOB.  Patient states doing much better.  Significantly less anxious than on last visit.  Respirations easy, nonlabored.  Offers no complaints.  Weight 113#- near baseline.  BLE edema significantly improved.  Appetite good.  Sleeping well. Spoke with family regarding patient's status and condition. No concerns per nursing.      Objective   Vital signs:  122/67, 53, 16    Physical Exam  Constitutional:       Appearance: Normal appearance.   HENT:      Head: Normocephalic.      Mouth/Throat:      Mouth: Mucous membranes are moist.   Eyes:      Extraocular Movements: Extraocular movements intact.   Cardiovascular:      Rate and Rhythm: Normal rate. Rhythm irregular.      Pulses: Normal pulses.      Heart sounds: Murmur heard.   Pulmonary:      Effort: Pulmonary effort is normal.      Breath sounds: Normal breath sounds.   Abdominal:      General: Bowel sounds are normal.      Palpations: Abdomen is soft.   Musculoskeletal:         General: Normal range of motion.      Cervical back: Normal range of motion and neck supple.      Right lower leg: Edema present.      Left lower leg: Edema present.      Comments:  Weakness-.  Trace edema   Skin:     General: Skin is warm and dry.      Capillary Refill: Capillary refill takes less than 2 seconds.   Neurological:      Mental Status: She is alert. Mental status is at baseline.   Psychiatric:         Mood and Affect: Mood normal.         Behavior: Behavior normal.         Assessment/Plan   Problem List Items Addressed This Visit       Leg edema      Improved   elevate compression hose   Torsemide    monitor         Chronic diastolic congestive heart failure (Multi) - Primary      continue torsemide   control BP- continue Cozaar   monitor weights   LOKI  diet         Permanent atrial fibrillation (Multi)      Stable   rate controlled   Eliquis   bleeding precautions   monitor         Current mild episode of major depressive disorder (CMS-HCC)      Improved mood   Lexapro   monitor behaviors   monitor for changes in appetite or sleeping pattern          Medications, treatments, and labs reviewed  Continue medications and treatments as listed in EMR    Ashlee Nicholas, APRN-CNP

## 2024-08-30 NOTE — ASSESSMENT & PLAN NOTE
Improved mood   Lexapro   monitor behaviors   monitor for changes in appetite or sleeping pattern

## 2024-09-03 DIAGNOSIS — I50.9 CONGESTIVE HEART FAILURE, UNSPECIFIED HF CHRONICITY, UNSPECIFIED HEART FAILURE TYPE (MULTI): ICD-10-CM

## 2024-09-03 RX ORDER — TORSEMIDE 20 MG/1
30 TABLET ORAL DAILY
Qty: 135 TABLET | Refills: 3 | Status: SHIPPED | OUTPATIENT
Start: 2024-09-03 | End: 2025-09-03

## 2024-09-24 ENCOUNTER — NURSING HOME VISIT (OUTPATIENT)
Dept: POST ACUTE CARE | Facility: EXTERNAL LOCATION | Age: 89
End: 2024-09-24
Payer: MEDICARE

## 2024-09-24 DIAGNOSIS — I50.32 CHRONIC DIASTOLIC CONGESTIVE HEART FAILURE: Primary | ICD-10-CM

## 2024-09-24 DIAGNOSIS — I48.11 LONGSTANDING PERSISTENT ATRIAL FIBRILLATION (MULTI): ICD-10-CM

## 2024-09-24 DIAGNOSIS — F32.0 CURRENT MILD EPISODE OF MAJOR DEPRESSIVE DISORDER, UNSPECIFIED WHETHER RECURRENT (CMS-HCC): ICD-10-CM

## 2024-09-24 DIAGNOSIS — I10 HYPERTENSION, ESSENTIAL: ICD-10-CM

## 2024-09-24 PROCEDURE — 99348 HOME/RES VST EST LOW MDM 30: CPT

## 2024-09-24 NOTE — LETTER
Patient: Ermelinda Benoit  : 3/14/1933    Encounter Date: 2024    PROGRESS NOTE    Subjective  Chief complaint: Ermelinda Benoit is a 89 y.o. female who is an assisted living facility patient being seen and evaluated for general medical care and monthly follow-up    HPI:  Patient seen and evaluated for general medical care and monthly follow-up.  Patient at baseline mentation, cooperative, pleasant.  Converses easily. Complains of ongoing dizziness.  A-fib stable - no SOB, dizziness, palpitations.  COPD stable - no cough, wheeze.   Appetite good.  Sleeping well.  Engages in activities with other residents.  No concerns per nursing      Objective  Vital signs:  117/68, 97.4, 73, 18, 96%    Physical Exam  Constitutional:       Appearance: Normal appearance.   HENT:      Head: Normocephalic.      Mouth/Throat:      Mouth: Mucous membranes are moist.   Eyes:      Extraocular Movements: Extraocular movements intact.   Cardiovascular:      Rate and Rhythm: Normal rate. Rhythm irregular.      Pulses: Normal pulses.      Heart sounds: Murmur heard.   Pulmonary:      Effort: Pulmonary effort is normal.      Breath sounds: Normal breath sounds.   Abdominal:      General: Bowel sounds are normal.      Palpations: Abdomen is soft.   Musculoskeletal:         General: Normal range of motion.      Cervical back: Normal range of motion and neck supple.      Right lower leg: Edema present.      Left lower leg: Edema present.      Comments:  Weakness-.  Trace edema   Skin:     General: Skin is warm and dry.      Capillary Refill: Capillary refill takes less than 2 seconds.   Neurological:      Mental Status: She is alert. Mental status is at baseline.   Psychiatric:         Mood and Affect: Mood normal.         Behavior: Behavior normal.         Assessment/Plan  Problem List Items Addressed This Visit       Hypertension, essential      Stable   BP within goal   losartan Torsemide   monitor         Chronic diastolic  congestive heart failure (Multi) - Primary      continue torsemide   control BP- continue Cozaar   monitor weights   LOKI diet         Longstanding persistent atrial fibrillation (Multi)      Stable    rate controlled   no SOB, palpitations, fatigue, pain   Eliquis   bleeding precautions   monitor         Current mild episode of major depressive disorder (CMS-HCC)      Improved mood   Lexapro   monitor behaviors   monitor for changes in appetite or sleeping pattern          Medications, treatments, and labs reviewed  Continue medications and treatments as listed in EMR    BLAS Bradley      Electronically Signed By: BLAS Bradley   9/25/24  8:46 PM

## 2024-09-26 NOTE — PROGRESS NOTES
PROGRESS NOTE    Subjective   Chief complaint: Ermelinda Benoit is a 89 y.o. female who is an assisted living facility patient being seen and evaluated for general medical care and monthly follow-up    HPI:  Patient seen and evaluated for general medical care and monthly follow-up.  Patient at baseline mentation, cooperative, pleasant.  Converses easily. Complains of ongoing dizziness.  A-fib stable - no SOB, dizziness, palpitations.  COPD stable - no cough, wheeze.   Appetite good.  Sleeping well.  Engages in activities with other residents.  No concerns per nursing      Objective   Vital signs:  117/68, 97.4, 73, 18, 96%    Physical Exam  Constitutional:       Appearance: Normal appearance.   HENT:      Head: Normocephalic.      Mouth/Throat:      Mouth: Mucous membranes are moist.   Eyes:      Extraocular Movements: Extraocular movements intact.   Cardiovascular:      Rate and Rhythm: Normal rate. Rhythm irregular.      Pulses: Normal pulses.      Heart sounds: Murmur heard.   Pulmonary:      Effort: Pulmonary effort is normal.      Breath sounds: Normal breath sounds.   Abdominal:      General: Bowel sounds are normal.      Palpations: Abdomen is soft.   Musculoskeletal:         General: Normal range of motion.      Cervical back: Normal range of motion and neck supple.      Right lower leg: Edema present.      Left lower leg: Edema present.      Comments:  Weakness-.  Trace edema   Skin:     General: Skin is warm and dry.      Capillary Refill: Capillary refill takes less than 2 seconds.   Neurological:      Mental Status: She is alert. Mental status is at baseline.   Psychiatric:         Mood and Affect: Mood normal.         Behavior: Behavior normal.         Assessment/Plan   Problem List Items Addressed This Visit       Hypertension, essential      Stable   BP within goal   losartan Torsemide   monitor         Chronic diastolic congestive heart failure (Multi) - Primary      continue torsemide   control  BP- continue Cozaar   monitor weights   LOKI diet         Longstanding persistent atrial fibrillation (Multi)      Stable    rate controlled   no SOB, palpitations, fatigue, pain   Eliquis   bleeding precautions   monitor         Current mild episode of major depressive disorder (CMS-HCC)      Improved mood   Lexapro   monitor behaviors   monitor for changes in appetite or sleeping pattern          Medications, treatments, and labs reviewed  Continue medications and treatments as listed in EMR    Ashlee Nicholas, APRN-CNP

## 2024-10-22 ENCOUNTER — NURSING HOME VISIT (OUTPATIENT)
Dept: POST ACUTE CARE | Facility: EXTERNAL LOCATION | Age: 89
End: 2024-10-22

## 2024-10-22 DIAGNOSIS — F32.0 CURRENT MILD EPISODE OF MAJOR DEPRESSIVE DISORDER, UNSPECIFIED WHETHER RECURRENT (CMS-HCC): ICD-10-CM

## 2024-10-22 DIAGNOSIS — I48.11 LONGSTANDING PERSISTENT ATRIAL FIBRILLATION (MULTI): ICD-10-CM

## 2024-10-22 DIAGNOSIS — I50.32 CHRONIC DIASTOLIC CONGESTIVE HEART FAILURE: Primary | ICD-10-CM

## 2024-10-22 DIAGNOSIS — I10 HYPERTENSION, ESSENTIAL: ICD-10-CM

## 2024-10-22 PROCEDURE — 99348 HOME/RES VST EST LOW MDM 30: CPT

## 2024-10-22 NOTE — LETTER
Patient: Ermelinda Benoit  : 3/14/1933    Encounter Date: 10/22/2024    PROGRESS NOTE    Subjective  Chief complaint: Ermelinda Benoit is a 89 y.o. female who is an assisted living facility patient being seen and evaluated for general medical care and monthly follow up    HPI:  Patient seen and evaluated for general medical care and monthly follow-up. .  Patient at baseline mentation, pleasant, cooperative.  Offers no complaints at time.  Patient offers no complaints of SOB.  BP within goal.  Mood stable - engages with other community members.  Appetite fair.  Sleeping well. No other concerns per nursing          Objective  Vital signs: 132/54, 55, 16      Physical Exam  Constitutional:       Appearance: Normal appearance.   HENT:      Head: Normocephalic.      Mouth/Throat:      Mouth: Mucous membranes are moist.   Eyes:      Extraocular Movements: Extraocular movements intact.   Cardiovascular:      Rate and Rhythm: Normal rate. Rhythm irregular.      Pulses: Normal pulses.      Heart sounds: Murmur heard.   Pulmonary:      Effort: Pulmonary effort is normal.      Breath sounds: Normal breath sounds.   Abdominal:      General: Bowel sounds are normal.      Palpations: Abdomen is soft.   Musculoskeletal:         General: Normal range of motion.      Cervical back: Normal range of motion and neck supple.      Right lower leg: Edema present.      Left lower leg: Edema present.      Comments:  Weakness-.  Trace edema   Skin:     General: Skin is warm and dry.      Capillary Refill: Capillary refill takes less than 2 seconds.   Neurological:      Mental Status: She is alert. Mental status is at baseline.   Psychiatric:         Mood and Affect: Mood normal.         Behavior: Behavior normal.         Assessment/Plan  Problem List Items Addressed This Visit       Hypertension, essential      Stable   BP within goal   losartan Torsemide   monitor         Chronic diastolic congestive heart failure - Primary       continue torsemide   control BP- continue Cozaar   monitor weights   LOKI diet         Longstanding persistent atrial fibrillation (Multi)      Stable    rate controlled   no SOB, palpitations, fatigue, pain   Eliquis   bleeding precautions   monitor         Current mild episode of major depressive disorder (CMS-HCC)      Improved mood   Lexapro   monitor behaviors   monitor for changes in appetite or sleeping pattern          Medications, treatments, and labs reviewed  Continue medications and treatments as listed in EMR    BLAS Bradley      Electronically Signed By: BLAS Bradley   10/27/24 12:07 PM

## 2024-10-27 NOTE — PROGRESS NOTES
PROGRESS NOTE    Subjective   Chief complaint: Ermelinda Benoit is a 89 y.o. female who is an assisted living facility patient being seen and evaluated for general medical care and monthly follow up    HPI:  Patient seen and evaluated for general medical care and monthly follow-up. .  Patient at baseline mentation, pleasant, cooperative.  Offers no complaints at time.  Patient offers no complaints of SOB.  BP within goal.  Mood stable - engages with other community members.  Appetite fair.  Sleeping well. No other concerns per nursing          Objective   Vital signs: 132/54, 55, 16      Physical Exam  Constitutional:       Appearance: Normal appearance.   HENT:      Head: Normocephalic.      Mouth/Throat:      Mouth: Mucous membranes are moist.   Eyes:      Extraocular Movements: Extraocular movements intact.   Cardiovascular:      Rate and Rhythm: Normal rate. Rhythm irregular.      Pulses: Normal pulses.      Heart sounds: Murmur heard.   Pulmonary:      Effort: Pulmonary effort is normal.      Breath sounds: Normal breath sounds.   Abdominal:      General: Bowel sounds are normal.      Palpations: Abdomen is soft.   Musculoskeletal:         General: Normal range of motion.      Cervical back: Normal range of motion and neck supple.      Right lower leg: Edema present.      Left lower leg: Edema present.      Comments:  Weakness-.  Trace edema   Skin:     General: Skin is warm and dry.      Capillary Refill: Capillary refill takes less than 2 seconds.   Neurological:      Mental Status: She is alert. Mental status is at baseline.   Psychiatric:         Mood and Affect: Mood normal.         Behavior: Behavior normal.         Assessment/Plan   Problem List Items Addressed This Visit       Hypertension, essential      Stable   BP within goal   losartan Torsemide   monitor         Chronic diastolic congestive heart failure - Primary      continue torsemide   control BP- continue Cozaar   monitor weights   LOKI  diet         Longstanding persistent atrial fibrillation (Multi)      Stable    rate controlled   no SOB, palpitations, fatigue, pain   Eliquis   bleeding precautions   monitor         Current mild episode of major depressive disorder (CMS-HCC)      Improved mood   Lexapro   monitor behaviors   monitor for changes in appetite or sleeping pattern          Medications, treatments, and labs reviewed  Continue medications and treatments as listed in EMR    Ashlee Nicholas, APRN-CNP

## 2024-10-29 ENCOUNTER — NURSING HOME VISIT (OUTPATIENT)
Dept: POST ACUTE CARE | Facility: EXTERNAL LOCATION | Age: 89
End: 2024-10-29
Payer: MEDICARE

## 2024-10-29 DIAGNOSIS — H00.014 HORDEOLUM EXTERNUM OF LEFT UPPER EYELID: Primary | ICD-10-CM

## 2024-10-29 DIAGNOSIS — R42 DIZZINESS: ICD-10-CM

## 2024-10-29 DIAGNOSIS — I10 HYPERTENSION, ESSENTIAL: ICD-10-CM

## 2024-10-29 PROCEDURE — 99347 HOME/RES VST EST SF MDM 20: CPT

## 2024-11-05 ENCOUNTER — NURSING HOME VISIT (OUTPATIENT)
Dept: POST ACUTE CARE | Facility: EXTERNAL LOCATION | Age: 89
End: 2024-11-05
Payer: MEDICARE

## 2024-11-05 DIAGNOSIS — I10 HYPERTENSION, ESSENTIAL: ICD-10-CM

## 2024-11-05 DIAGNOSIS — I48.11 LONGSTANDING PERSISTENT ATRIAL FIBRILLATION (MULTI): ICD-10-CM

## 2024-11-05 DIAGNOSIS — H00.014 HORDEOLUM EXTERNUM OF LEFT UPPER EYELID: ICD-10-CM

## 2024-11-05 DIAGNOSIS — F32.0 CURRENT MILD EPISODE OF MAJOR DEPRESSIVE DISORDER, UNSPECIFIED WHETHER RECURRENT (CMS-HCC): ICD-10-CM

## 2024-11-05 DIAGNOSIS — I50.32 CHRONIC DIASTOLIC CONGESTIVE HEART FAILURE: Primary | ICD-10-CM

## 2024-11-05 PROCEDURE — 99348 HOME/RES VST EST LOW MDM 30: CPT

## 2024-11-05 NOTE — LETTER
Patient: Ermelinda Benoit  : 3/14/1933    Encounter Date: 2024    PROGRESS NOTE    Subjective  Chief complaint: Ermelinda Benoit is a 89 y.o. female who is an assisted living facility patient being seen and evaluated for general medical care and monthly follow up      HPI:  Patient seen and evaluated for general medical care and monthly follow-up.  Patient at baseline mentation, cooperative, pleasant.  Converses easily.  Offers no complaints at time. Stye left upper eyelid present.  Reinforced frequent warm compresses. Appetite good.  Sleeping well.  Engages in meals with other residents.  No concerns per nursing      Objective  Vital signs:  146/77, 97.2, 77, 16, 92%    Physical Exam  Constitutional:       Appearance: Normal appearance.   HENT:      Head: Normocephalic.      Mouth/Throat:      Mouth: Mucous membranes are moist.   Eyes:      Extraocular Movements: Extraocular movements intact.      Comments: Stye left upper lid   Cardiovascular:      Rate and Rhythm: Normal rate. Rhythm irregular.      Pulses: Normal pulses.      Heart sounds: Murmur heard.   Pulmonary:      Effort: Pulmonary effort is normal.      Breath sounds: Normal breath sounds.   Abdominal:      General: Bowel sounds are normal.      Palpations: Abdomen is soft.   Musculoskeletal:         General: Normal range of motion.      Cervical back: Normal range of motion and neck supple.      Right lower leg: Edema present.      Left lower leg: Edema present.   Skin:     General: Skin is warm and dry.      Capillary Refill: Capillary refill takes less than 2 seconds.   Neurological:      Mental Status: She is alert. Mental status is at baseline.   Psychiatric:         Mood and Affect: Mood normal.         Behavior: Behavior normal.         Assessment/Plan  Problem List Items Addressed This Visit       Hypertension, essential      BP elevated at time of assessment   trend for medication adjustment   losartan Torsemide   monitor          Chronic diastolic congestive heart failure - Primary      continue torsemide   control BP- continue Cozaar   monitor weights   LOKI diet   Monitor BP   Routine BMP         Longstanding persistent atrial fibrillation (Multi)      Stable    rate controlled   no SOB, palpitations, fatigue, pain   Eliquis   bleeding precautions   monitor         Current mild episode of major depressive disorder (CMS-[facility])      Improved mood   continues engaging with other residents at meals   Lexapro   monitor behaviors   monitor for changes in appetite or sleeping pattern         Hordeolum externum of left upper eyelid     Warm compresses  No pain, visual changes, eye irritation  monitor          Medications, treatments, and labs reviewed  Continue medications and treatments as listed in EMR    BLAS Bradley      Electronically Signed By: BLAS Bradley   11/7/24  5:09 PM

## 2024-11-07 NOTE — ASSESSMENT & PLAN NOTE
continue torsemide   control BP- continue Cozaar   monitor weights   LOKI diet   Monitor BP   Routine BMP

## 2024-11-07 NOTE — ASSESSMENT & PLAN NOTE
Improved mood   continues engaging with other residents at meals   Lexapro   monitor behaviors   monitor for changes in appetite or sleeping pattern

## 2024-11-07 NOTE — PROGRESS NOTES
PROGRESS NOTE    Subjective   Chief complaint: Ermelinda Benoit is a 89 y.o. female who is an assisted living facility patient being seen and evaluated for general medical care and monthly follow up      HPI:  Patient seen and evaluated for general medical care and monthly follow-up.  Patient at baseline mentation, cooperative, pleasant.  Converses easily.  Offers no complaints at time. Stye left upper eyelid present.  Reinforced frequent warm compresses. Appetite good.  Sleeping well.  Engages in meals with other residents.  No concerns per nursing      Objective   Vital signs:  146/77, 97.2, 77, 16, 92%    Physical Exam  Constitutional:       Appearance: Normal appearance.   HENT:      Head: Normocephalic.      Mouth/Throat:      Mouth: Mucous membranes are moist.   Eyes:      Extraocular Movements: Extraocular movements intact.      Comments: Stye left upper lid   Cardiovascular:      Rate and Rhythm: Normal rate. Rhythm irregular.      Pulses: Normal pulses.      Heart sounds: Murmur heard.   Pulmonary:      Effort: Pulmonary effort is normal.      Breath sounds: Normal breath sounds.   Abdominal:      General: Bowel sounds are normal.      Palpations: Abdomen is soft.   Musculoskeletal:         General: Normal range of motion.      Cervical back: Normal range of motion and neck supple.      Right lower leg: Edema present.      Left lower leg: Edema present.   Skin:     General: Skin is warm and dry.      Capillary Refill: Capillary refill takes less than 2 seconds.   Neurological:      Mental Status: She is alert. Mental status is at baseline.   Psychiatric:         Mood and Affect: Mood normal.         Behavior: Behavior normal.         Assessment/Plan   Problem List Items Addressed This Visit       Hypertension, essential      BP elevated at time of assessment   trend for medication adjustment   losartan Torsemide   monitor         Chronic diastolic congestive heart failure - Primary      continue  torsemide   control BP- continue Cozaar   monitor weights   LOKI diet   Monitor BP   Routine BMP         Longstanding persistent atrial fibrillation (Multi)      Stable    rate controlled   no SOB, palpitations, fatigue, pain   Eliquis   bleeding precautions   monitor         Current mild episode of major depressive disorder (CMS-HCC)      Improved mood   continues engaging with other residents at meals   Lexapro   monitor behaviors   monitor for changes in appetite or sleeping pattern         Hordeolum externum of left upper eyelid     Warm compresses  No pain, visual changes, eye irritation  monitor          Medications, treatments, and labs reviewed  Continue medications and treatments as listed in EMR    Ashlee Nicholas, APRN-CNP

## 2024-11-07 NOTE — ASSESSMENT & PLAN NOTE
BP elevated at time of assessment   trend for medication adjustment   losartan Torsemide   monitor

## 2024-12-04 ENCOUNTER — APPOINTMENT (OUTPATIENT)
Dept: CARDIOLOGY | Facility: CLINIC | Age: 89
End: 2024-12-04
Payer: MEDICARE

## 2024-12-10 ENCOUNTER — NURSING HOME VISIT (OUTPATIENT)
Dept: POST ACUTE CARE | Facility: EXTERNAL LOCATION | Age: 89
End: 2024-12-10
Payer: MEDICARE

## 2024-12-10 DIAGNOSIS — F32.0 CURRENT MILD EPISODE OF MAJOR DEPRESSIVE DISORDER, UNSPECIFIED WHETHER RECURRENT (CMS-HCC): ICD-10-CM

## 2024-12-10 DIAGNOSIS — H00.014 HORDEOLUM EXTERNUM OF LEFT UPPER EYELID: ICD-10-CM

## 2024-12-10 DIAGNOSIS — I50.32 CHRONIC DIASTOLIC CONGESTIVE HEART FAILURE: ICD-10-CM

## 2024-12-10 DIAGNOSIS — R26.2 AMBULATORY DYSFUNCTION: Primary | ICD-10-CM

## 2024-12-10 DIAGNOSIS — K59.01 SLOW TRANSIT CONSTIPATION: ICD-10-CM

## 2024-12-10 DIAGNOSIS — I48.11 LONGSTANDING PERSISTENT ATRIAL FIBRILLATION (MULTI): ICD-10-CM

## 2024-12-10 PROCEDURE — 99348 HOME/RES VST EST LOW MDM 30: CPT

## 2024-12-10 NOTE — LETTER
Patient: Ermelinda Benoit  : 3/14/1933    Encounter Date: 12/10/2024    PROGRESS NOTE    Subjective  Chief complaint: Ermelinda Benoit is a 89 y.o. female who is an assisted living facility patient being seen and evaluated for general medical care and monthly follow-up    HPI:  Patient seen and evaluated for general medical care and monthly follow-up.  Patient at baseline mentation, pleasant, cooperative.  Offers no complaints at time.  Patient offers no complaints of SOB.  BP within goal.  Mood stable - engages with other community members.  Appetite fair.  Sleeping well. No other concerns per nursing          Objective  Vital signs:  152/60, 98.2, 54, 18, 93%    Physical Exam  Constitutional:       Appearance: Normal appearance.   HENT:      Head: Normocephalic.      Nose: Nose normal.      Mouth/Throat:      Mouth: Mucous membranes are moist.   Eyes:      Extraocular Movements: Extraocular movements intact.      Comments: Stye left upper lid improved   Cardiovascular:      Rate and Rhythm: Normal rate. Rhythm irregular.      Pulses: Normal pulses.      Heart sounds: Murmur heard.   Pulmonary:      Effort: Pulmonary effort is normal.      Breath sounds: Normal breath sounds.   Abdominal:      General: Bowel sounds are normal.      Palpations: Abdomen is soft.   Musculoskeletal:         General: Normal range of motion.      Cervical back: Normal range of motion and neck supple.      Right lower leg: Edema present.      Left lower leg: Edema present.   Skin:     General: Skin is warm and dry.      Capillary Refill: Capillary refill takes less than 2 seconds.   Neurological:      Mental Status: She is alert. Mental status is at baseline.   Psychiatric:         Mood and Affect: Mood normal.         Behavior: Behavior normal.         Assessment/Plan  Problem List Items Addressed This Visit       Chronic diastolic congestive heart failure      continue torsemide   control BP- continue Cozaar   monitor weights   LOKI  diet   Monitor BP   Routine BMP         Longstanding persistent atrial fibrillation (Multi)      Stable    rate controlled   no SOB, palpitations, fatigue, pain   Eliquis   bleeding precautions   monitor         Ambulatory dysfunction - Primary      ambulate with walker assist due to weakness   Patient to use wheelchair for distance   Reinforced calling for assistance when needed         Constipation, unspecified     No complaints  Continue MiraLAX, Senokot         Current mild episode of major depressive disorder (CMS-HCC)      Improved mood   continues engaging with other residents at meals   Lexapro   monitor behaviors   monitor for changes in appetite or sleeping pattern         Hordeolum externum of left upper eyelid     Improved  Warm compresses  No pain, visual changes, eye irritation  monitor          Medications, treatments, and labs reviewed  Continue medications and treatments as listed in EMR    BLAS Bradley      Electronically Signed By: BLAS Bradley   12/30/24  5:23 PM

## 2024-12-18 ENCOUNTER — APPOINTMENT (OUTPATIENT)
Dept: RADIOLOGY | Facility: HOSPITAL | Age: 89
End: 2024-12-18
Payer: MEDICARE

## 2024-12-18 ENCOUNTER — HOSPITAL ENCOUNTER (INPATIENT)
Facility: HOSPITAL | Age: 89
End: 2024-12-18
Attending: STUDENT IN AN ORGANIZED HEALTH CARE EDUCATION/TRAINING PROGRAM | Admitting: INTERNAL MEDICINE
Payer: MEDICARE

## 2024-12-18 ENCOUNTER — APPOINTMENT (OUTPATIENT)
Dept: CARDIOLOGY | Facility: HOSPITAL | Age: 89
End: 2024-12-18
Payer: MEDICARE

## 2024-12-18 DIAGNOSIS — R09.02 HYPOXEMIA: ICD-10-CM

## 2024-12-18 DIAGNOSIS — S72.145A CLOSED NONDISPLACED INTERTROCHANTERIC FRACTURE OF LEFT FEMUR, INITIAL ENCOUNTER: ICD-10-CM
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
## 2024-12-18 DIAGNOSIS — F41.9 ANXIETY: ICD-10-CM

## 2024-12-18 LAB
ABO GROUP (TYPE) IN BLOOD: NORMAL
ALBUMIN SERPL BCP-MCNC: 4.7 G/DL (ref 3.4–5)
ALP SERPL-CCNC: 87 U/L (ref 33–136)
ALT SERPL W P-5'-P-CCNC: 19 U/L (ref 7–45)
ANION GAP SERPL CALC-SCNC: 17 MMOL/L (ref 10–20)
ANTIBODY SCREEN: NORMAL
AST SERPL W P-5'-P-CCNC: 22 U/L (ref 9–39)
BASOPHILS # BLD AUTO: 0.02 X10*3/UL (ref 0–0.1)
BASOPHILS NFR BLD AUTO: 0.3 %
BILIRUB SERPL-MCNC: 1.5 MG/DL (ref 0–1.2)
BNP SERPL-MCNC: 269 PG/ML (ref 0–99)
BUN SERPL-MCNC: 30 MG/DL (ref 6–23)
CALCIUM SERPL-MCNC: 9.8 MG/DL (ref 8.6–10.3)
CHLORIDE SERPL-SCNC: 101 MMOL/L (ref 98–107)
CO2 SERPL-SCNC: 26 MMOL/L (ref 21–32)
CREAT SERPL-MCNC: 0.93 MG/DL (ref 0.5–1.05)
EGFRCR SERPLBLD CKD-EPI 2021: 58 ML/MIN/1.73M*2
EOSINOPHIL # BLD AUTO: 0.09 X10*3/UL (ref 0–0.4)
EOSINOPHIL NFR BLD AUTO: 1.4 %
ERYTHROCYTE [DISTWIDTH] IN BLOOD BY AUTOMATED COUNT: 12.2 % (ref 11.5–14.5)
GLUCOSE SERPL-MCNC: 89 MG/DL (ref 74–99)
HCT VFR BLD AUTO: 40.6 % (ref 36–46)
HGB BLD-MCNC: 13.5 G/DL (ref 12–16)
HOLD SPECIMEN: NORMAL
IMM GRANULOCYTES # BLD AUTO: 0.03 X10*3/UL (ref 0–0.5)
IMM GRANULOCYTES NFR BLD AUTO: 0.5 % (ref 0–0.9)
INR PPP: 1.3 (ref 0.9–1.1)
LYMPHOCYTES # BLD AUTO: 1.03 X10*3/UL (ref 0.8–3)
LYMPHOCYTES NFR BLD AUTO: 16.5 %
MCH RBC QN AUTO: 30.8 PG (ref 26–34)
MCHC RBC AUTO-ENTMCNC: 33.3 G/DL (ref 32–36)
MCV RBC AUTO: 93 FL (ref 80–100)
MONOCYTES # BLD AUTO: 0.43 X10*3/UL (ref 0.05–0.8)
MONOCYTES NFR BLD AUTO: 6.9 %
NEUTROPHILS # BLD AUTO: 4.63 X10*3/UL (ref 1.6–5.5)
NEUTROPHILS NFR BLD AUTO: 74.4 %
NRBC BLD-RTO: 0 /100 WBCS (ref 0–0)
PLATELET # BLD AUTO: 183 X10*3/UL (ref 150–450)
POTASSIUM SERPL-SCNC: 4.2 MMOL/L (ref 3.5–5.3)
PROT SERPL-MCNC: 8.4 G/DL (ref 6.4–8.2)
PROTHROMBIN TIME: 14.2 SECONDS (ref 9.8–12.8)
RBC # BLD AUTO: 4.38 X10*6/UL (ref 4–5.2)
RH FACTOR (ANTIGEN D): NORMAL
SODIUM SERPL-SCNC: 140 MMOL/L (ref 136–145)
WBC # BLD AUTO: 6.2 X10*3/UL (ref 4.4–11.3)

## 2024-12-18 PROCEDURE — 99285 EMERGENCY DEPT VISIT HI MDM: CPT | Mod: 25 | Performed by: STUDENT IN AN ORGANIZED HEALTH CARE EDUCATION/TRAINING PROGRAM

## 2024-12-18 PROCEDURE — 71045 X-RAY EXAM CHEST 1 VIEW: CPT

## 2024-12-18 PROCEDURE — 72125 CT NECK SPINE W/O DYE: CPT | Performed by: RADIOLOGY

## 2024-12-18 PROCEDURE — 73502 X-RAY EXAM HIP UNI 2-3 VIEWS: CPT | Mod: LT

## 2024-12-18 PROCEDURE — 86901 BLOOD TYPING SEROLOGIC RH(D): CPT

## 2024-12-18 PROCEDURE — 83880 ASSAY OF NATRIURETIC PEPTIDE: CPT

## 2024-12-18 PROCEDURE — 36415 COLL VENOUS BLD VENIPUNCTURE: CPT

## 2024-12-18 PROCEDURE — 1100000001 HC PRIVATE ROOM DAILY

## 2024-12-18 PROCEDURE — 93005 ELECTROCARDIOGRAM TRACING: CPT

## 2024-12-18 PROCEDURE — 80053 COMPREHEN METABOLIC PANEL: CPT

## 2024-12-18 PROCEDURE — 99223 1ST HOSP IP/OBS HIGH 75: CPT

## 2024-12-18 PROCEDURE — G0390 TRAUMA RESPONS W/HOSP CRITI: HCPCS

## 2024-12-18 PROCEDURE — 73502 X-RAY EXAM HIP UNI 2-3 VIEWS: CPT | Mod: LEFT SIDE | Performed by: RADIOLOGY

## 2024-12-18 PROCEDURE — 70450 CT HEAD/BRAIN W/O DYE: CPT | Performed by: RADIOLOGY

## 2024-12-18 PROCEDURE — 94762 N-INVAS EAR/PLS OXIMTRY CONT: CPT

## 2024-12-18 PROCEDURE — 70450 CT HEAD/BRAIN W/O DYE: CPT

## 2024-12-18 PROCEDURE — 2500000001 HC RX 250 WO HCPCS SELF ADMINISTERED DRUGS (ALT 637 FOR MEDICARE OP)

## 2024-12-18 PROCEDURE — 2500000002 HC RX 250 W HCPCS SELF ADMINISTERED DRUGS (ALT 637 FOR MEDICARE OP, ALT 636 FOR OP/ED)

## 2024-12-18 PROCEDURE — 85025 COMPLETE CBC W/AUTO DIFF WBC: CPT

## 2024-12-18 PROCEDURE — 85610 PROTHROMBIN TIME: CPT

## 2024-12-18 PROCEDURE — 72125 CT NECK SPINE W/O DYE: CPT

## 2024-12-18 PROCEDURE — 2500000004 HC RX 250 GENERAL PHARMACY W/ HCPCS (ALT 636 FOR OP/ED)

## 2024-12-18 RX ORDER — IPRATROPIUM BROMIDE AND ALBUTEROL SULFATE 2.5; .5 MG/3ML; MG/3ML
3 SOLUTION RESPIRATORY (INHALATION) EVERY 4 HOURS
Status: DISCONTINUED | OUTPATIENT
Start: 2024-12-18 | End: 2024-12-18

## 2024-12-18 RX ORDER — GUAIFENESIN 600 MG/1
1200 TABLET, EXTENDED RELEASE ORAL 2 TIMES DAILY PRN
Status: DISCONTINUED | OUTPATIENT
Start: 2024-12-18 | End: 2024-12-27 | Stop reason: HOSPADM

## 2024-12-18 RX ORDER — ADHESIVE BANDAGE
30 BANDAGE TOPICAL DAILY PRN
COMMUNITY
End: 2024-12-27 | Stop reason: HOSPADM

## 2024-12-18 RX ORDER — ACETAMINOPHEN 325 MG/1
650 TABLET ORAL EVERY 4 HOURS PRN
Status: DISCONTINUED | OUTPATIENT
Start: 2024-12-18 | End: 2024-12-19

## 2024-12-18 RX ORDER — DEXTROMETHORPHAN POLISTIREX 30 MG/5 ML
118 SUSPENSION, EXTENDED RELEASE 12 HR ORAL DAILY PRN
Status: ON HOLD | COMMUNITY
End: 2024-12-21 | Stop reason: WASHOUT

## 2024-12-18 RX ORDER — AMOXICILLIN 250 MG
2 CAPSULE ORAL NIGHTLY
Status: DISCONTINUED | OUTPATIENT
Start: 2024-12-18 | End: 2024-12-27 | Stop reason: HOSPADM

## 2024-12-18 RX ORDER — IPRATROPIUM BROMIDE AND ALBUTEROL SULFATE 2.5; .5 MG/3ML; MG/3ML
3 SOLUTION RESPIRATORY (INHALATION)
Status: DISCONTINUED | OUTPATIENT
Start: 2024-12-19 | End: 2024-12-19

## 2024-12-18 RX ORDER — ADHESIVE BANDAGE
30 BANDAGE TOPICAL DAILY PRN
Status: DISCONTINUED | OUTPATIENT
Start: 2024-12-18 | End: 2024-12-19 | Stop reason: SDUPTHER

## 2024-12-18 RX ORDER — ROSUVASTATIN CALCIUM 10 MG/1
5 TABLET, COATED ORAL NIGHTLY
Status: DISCONTINUED | OUTPATIENT
Start: 2024-12-18 | End: 2024-12-27 | Stop reason: HOSPADM

## 2024-12-18 RX ORDER — BISACODYL 10 MG/1
10 SUPPOSITORY RECTAL DAILY PRN
Status: ON HOLD | COMMUNITY
End: 2024-12-21 | Stop reason: ENTERED-IN-ERROR

## 2024-12-18 RX ORDER — CHOLECALCIFEROL (VITAMIN D3) 25 MCG
2000 TABLET ORAL DAILY
Status: DISCONTINUED | OUTPATIENT
Start: 2024-12-19 | End: 2024-12-27 | Stop reason: HOSPADM

## 2024-12-18 RX ORDER — LOSARTAN POTASSIUM 50 MG/1
100 TABLET ORAL DAILY
Status: DISCONTINUED | OUTPATIENT
Start: 2024-12-19 | End: 2024-12-27 | Stop reason: HOSPADM

## 2024-12-18 RX ORDER — ACETAMINOPHEN 325 MG/1
650 TABLET ORAL ONCE
Status: DISCONTINUED | OUTPATIENT
Start: 2024-12-18 | End: 2024-12-18

## 2024-12-18 RX ORDER — ACETAMINOPHEN 325 MG/1
650 TABLET ORAL 2 TIMES DAILY
COMMUNITY

## 2024-12-18 RX ORDER — GUAIFENESIN 600 MG/1
1200 TABLET, EXTENDED RELEASE ORAL 2 TIMES DAILY PRN
COMMUNITY

## 2024-12-18 RX ORDER — KETOROLAC TROMETHAMINE 30 MG/ML
15 INJECTION, SOLUTION INTRAMUSCULAR; INTRAVENOUS ONCE
Status: COMPLETED | OUTPATIENT
Start: 2024-12-18 | End: 2024-12-18

## 2024-12-18 RX ORDER — SODIUM CHLORIDE, SODIUM LACTATE, POTASSIUM CHLORIDE, CALCIUM CHLORIDE 600; 310; 30; 20 MG/100ML; MG/100ML; MG/100ML; MG/100ML
50 INJECTION, SOLUTION INTRAVENOUS CONTINUOUS
Status: DISCONTINUED | OUTPATIENT
Start: 2024-12-18 | End: 2024-12-19

## 2024-12-18 RX ORDER — BISACODYL 10 MG/1
10 SUPPOSITORY RECTAL DAILY PRN
Status: DISCONTINUED | OUTPATIENT
Start: 2024-12-18 | End: 2024-12-27 | Stop reason: HOSPADM

## 2024-12-18 RX ORDER — IPRATROPIUM BROMIDE AND ALBUTEROL SULFATE 2.5; .5 MG/3ML; MG/3ML
3 SOLUTION RESPIRATORY (INHALATION) EVERY 4 HOURS
COMMUNITY

## 2024-12-18 RX ORDER — ONDANSETRON HYDROCHLORIDE 2 MG/ML
4 INJECTION, SOLUTION INTRAVENOUS EVERY 6 HOURS PRN
Status: DISCONTINUED | OUTPATIENT
Start: 2024-12-18 | End: 2024-12-27 | Stop reason: HOSPADM

## 2024-12-18 RX ORDER — TALC
3 POWDER (GRAM) TOPICAL NIGHTLY PRN
Status: DISCONTINUED | OUTPATIENT
Start: 2024-12-18 | End: 2024-12-27 | Stop reason: HOSPADM

## 2024-12-18 RX ORDER — ESCITALOPRAM OXALATE 10 MG/1
5 TABLET ORAL DAILY
Status: DISCONTINUED | OUTPATIENT
Start: 2024-12-19 | End: 2024-12-23

## 2024-12-18 RX ORDER — POLYETHYLENE GLYCOL 3350 17 G/17G
17 POWDER, FOR SOLUTION ORAL DAILY PRN
COMMUNITY

## 2024-12-18 RX ORDER — ACETAMINOPHEN 325 MG/1
325 TABLET ORAL ONCE
Status: COMPLETED | OUTPATIENT
Start: 2024-12-18 | End: 2024-12-18

## 2024-12-18 RX ORDER — POLYETHYLENE GLYCOL 3350 17 G/17G
17 POWDER, FOR SOLUTION ORAL DAILY PRN
Status: DISCONTINUED | OUTPATIENT
Start: 2024-12-18 | End: 2024-12-27 | Stop reason: HOSPADM

## 2024-12-18 RX ORDER — POTASSIUM CHLORIDE 750 MG/1
10 TABLET, FILM COATED, EXTENDED RELEASE ORAL 2 TIMES DAILY
Status: DISCONTINUED | OUTPATIENT
Start: 2024-12-18 | End: 2024-12-27 | Stop reason: HOSPADM

## 2024-12-18 RX ADMIN — SODIUM CHLORIDE, POTASSIUM CHLORIDE, SODIUM LACTATE AND CALCIUM CHLORIDE 50 ML/HR: 600; 310; 30; 20 INJECTION, SOLUTION INTRAVENOUS at 22:19

## 2024-12-18 RX ADMIN — POTASSIUM CHLORIDE 10 MEQ: 750 TABLET, EXTENDED RELEASE ORAL at 22:19

## 2024-12-18 RX ADMIN — KETOROLAC TROMETHAMINE 15 MG: 30 INJECTION, SOLUTION INTRAMUSCULAR at 22:19

## 2024-12-18 RX ADMIN — ACETAMINOPHEN 325 MG: 325 TABLET, FILM COATED ORAL at 18:43

## 2024-12-18 ASSESSMENT — PAIN SCALES - GENERAL
PAINLEVEL_OUTOF10: 8
PAINLEVEL_OUTOF10: 10 - WORST POSSIBLE PAIN
PAINLEVEL_OUTOF10: 10 - WORST POSSIBLE PAIN

## 2024-12-18 ASSESSMENT — LIFESTYLE VARIABLES
EVER HAD A DRINK FIRST THING IN THE MORNING TO STEADY YOUR NERVES TO GET RID OF A HANGOVER: NO
TOTAL SCORE: 0
HAVE PEOPLE ANNOYED YOU BY CRITICIZING YOUR DRINKING: NO
HAVE YOU EVER FELT YOU SHOULD CUT DOWN ON YOUR DRINKING: NO
EVER FELT BAD OR GUILTY ABOUT YOUR DRINKING: NO

## 2024-12-18 ASSESSMENT — PAIN DESCRIPTION - PAIN TYPE: TYPE: ACUTE PAIN

## 2024-12-18 ASSESSMENT — PAIN - FUNCTIONAL ASSESSMENT
PAIN_FUNCTIONAL_ASSESSMENT: 0-10
PAIN_FUNCTIONAL_ASSESSMENT: 0-10

## 2024-12-18 ASSESSMENT — PAIN DESCRIPTION - FREQUENCY: FREQUENCY: CONSTANT/CONTINUOUS

## 2024-12-18 ASSESSMENT — PAIN DESCRIPTION - LOCATION: LOCATION: HIP

## 2024-12-18 ASSESSMENT — PAIN DESCRIPTION - ORIENTATION: ORIENTATION: LEFT

## 2024-12-18 NOTE — ED PROVIDER NOTES
EMERGENCY DEPARTMENT ENCOUNTER      Pt Name: Ermelinda Benoit  MRN: [id number]  Birthdate 3/14/1933  Date of evaluation: 12/18/2024  Provider: Gary Dexter MD    CHIEF COMPLAINT       Chief Complaint   Patient presents with    Fall         HISTORY OF PRESENT ILLNESS    HPI  Patient is a 89-year-old female with history of A-fib on Eliquis, COPD, TAVR, breast cancer, CVA, PVD, HLD presenting after a fall.  Patient was at her nursing facility when she lost her balance, falling onto her left hand side.  She is primarily complaining of unspecified back pain but has difficulty localizing.  She states she did not strike her head or lose consciousness.  She denies headache, vision changes, chest pain, shortness of breath, abdominal pain, nausea, vomiting, arm or leg pain.    Nursing Notes were reviewed.    PAST MEDICAL HISTORY     Past Medical History:   Diagnosis Date    Personal history of malignant neoplasm of breast 01/09/2018    History of malignant neoplasm of breast    Personal history of other medical treatment     History of cardiac monitoring    Personal history of other medical treatment     History of echocardiogram    Unspecified nondisplaced fracture of fifth cervical vertebra, initial encounter for closed fracture 11/15/2016    Closed nondisplaced fracture of fifth cervical vertebra, unspecified fracture morphology, initial encounter         SURGICAL HISTORY       Past Surgical History:   Procedure Laterality Date    BREAST LUMPECTOMY  03/09/2018    Breast Surgery Lumpectomy    CATARACT EXTRACTION  01/23/2018    Cataract Surgery    COLONOSCOPY  03/09/2018    Colonoscopy (Fiberoptic)    OTHER SURGICAL HISTORY  01/09/2018    Therapeutic Cystoscopy    OTHER SURGICAL HISTORY  03/30/2021    Transcatheter aortic valve replacement    TOTAL HIP ARTHROPLASTY  03/09/2018    Hip Replacement         CURRENT MEDICATIONS       Previous Medications    ACETAMINOPHEN (TYLENOL) 500 MG TABLET    Take 1 tablet (500 mg) by  Goal Outcome Evaluation:    0315-0700    Neuro: Pt sedated, RASS -2. Neuros unchanged. Following commands, moves all extremities.  Cardiac: Tele SB. On phenylephrine, MAP > 65.   Respiratory: Intubated. FiO2 40%, PEEP 6. Coughing at times. LS dim. Small amounts of thick secretions.    GI/: OG tube, TF running @ 80 mL/hr, 30 mL H2O q4h. Anuric.   Lines/Drips: Rt femoral CVC and art line. RIJ for dialysis. Phenylephrine gtt. Propofol and fentanyl for sedation. Heparin gtt, set rate of 500 units/hr.    Pain: Nods yes for pain, PRN fentanyl bolus given x1.   Skin: Scattered bruising and petechiae. Sacral wound, mepilex C/D/I. Scabbing/lesions on nose/mouth.   Activity: Bedrest. Assist of 2 w/ lift.   Additional/Plan: Nephrology, ID, WOC following. Continue with plan of care.    mouth every 4 hours if needed for mild pain (1 - 3).    APIXABAN (ELIQUIS) 2.5 MG TABLET    Take 1 tablet (2.5 mg) by mouth 2 times a day.    CHOLECALCIFEROL (VITAMIN D-3) 50 MCG (2,000 UNIT) CAPSULE    Take 1 capsule (50 mcg) by mouth early in the morning..    ESCITALOPRAM (LEXAPRO) 5 MG TABLET    Take 1 tablet (5 mg) by mouth once daily.    LOSARTAN (COZAAR) 100 MG TABLET    Take 1 tablet (100 mg) by mouth once daily.    MELATONIN 3 MG TABLET    Take 1 tablet (3 mg) by mouth once daily at bedtime.    POTASSIUM CHLORIDE CR 10 MEQ ER TABLET    Take 1 tablet (10 mEq) by mouth once daily.    ROSUVASTATIN (CRESTOR) 5 MG TABLET    Take 1 tablet (5 mg) by mouth once daily.    SENNOSIDES-DOCUSATE SODIUM (ALMA-COLACE) 8.6-50 MG TABLET    Take 1 tablet by mouth once daily.    TORSEMIDE (DEMADEX) 20 MG TABLET    Take 1.5 tablets (30 mg) by mouth once daily.       ALLERGIES     Codeine, Iodinated contrast media, Macrolide antibiotics, Moxifloxacin, Penicillins, Adhesive, Erythromycin base, Nitrofurantoin, Propoxyphene, Tetracyclines, Erythromycin, and Sulfa (sulfonamide antibiotics)    FAMILY HISTORY       Family History   Problem Relation Name Age of Onset    Other (cardiac disorder) Mother      Diabetes Father            SOCIAL HISTORY       Social History     Socioeconomic History    Marital status:    Tobacco Use    Smoking status: Never    Smokeless tobacco: Never       SCREENINGS                        PHYSICAL EXAM    (up to 7 for level 4, 8 or more for level 5)     ED Triage Vitals   Temperature Heart Rate Respirations BP   12/18/24 1741 12/18/24 1741 12/18/24 1741 12/18/24 1741   36.2 °C (97.2 °F) 81 (!) 22 176/81      Pulse Ox Temp src Heart Rate Source Patient Position   12/18/24 1741 -- -- --   (!) 87 %         BP Location FiO2 (%)     -- --             Physical Exam  Constitutional:       General: She is not in acute distress.     Appearance: She is not toxic-appearing.   HENT:      Head: Normocephalic and  atraumatic.      Nose: Nose normal.      Mouth/Throat:      Mouth: Mucous membranes are moist.      Pharynx: Oropharynx is clear.   Eyes:      Extraocular Movements: Extraocular movements intact.      Conjunctiva/sclera: Conjunctivae normal.   Cardiovascular:      Rate and Rhythm: Normal rate and regular rhythm.      Pulses: Normal pulses.      Heart sounds: Normal heart sounds.   Pulmonary:      Effort: Pulmonary effort is normal. No respiratory distress.      Breath sounds: Normal breath sounds.   Abdominal:      General: There is no distension.      Palpations: Abdomen is soft.      Tenderness: There is no abdominal tenderness.   Musculoskeletal:         General: No deformity.      Cervical back: Normal range of motion and neck supple.      Right lower leg: No edema.      Left lower leg: No edema.      Comments: Pain to palpation deep to the left hip joint.  Pelvis is stable to AP and lateral compression.  No pain to palpation of the entire thoracic and lumbar spine.  No pain to palpation of the paraspinal muscles.  No pain to palpation of the entire rib cage, sternum.  No pain to palpation of the bilateral upper and lower extremities.  No obvious injuries.   Skin:     General: Skin is warm and dry.      Capillary Refill: Capillary refill takes less than 2 seconds.   Neurological:      General: No focal deficit present.      Mental Status: Mental status is at baseline.          DIAGNOSTIC RESULTS     LABS:  Labs Reviewed   PROTIME-INR - Abnormal       Result Value    Protime 14.2 (*)     INR 1.3 (*)    CBC WITH AUTO DIFFERENTIAL    WBC 6.2      nRBC 0.0      RBC 4.38      Hemoglobin 13.5      Hematocrit 40.6      MCV 93      MCH 30.8      MCHC 33.3      RDW 12.2      Platelets 183      Neutrophils % 74.4      Immature Granulocytes %, Automated 0.5      Lymphocytes % 16.5      Monocytes % 6.9      Eosinophils % 1.4      Basophils % 0.3      Neutrophils Absolute 4.63      Immature Granulocytes Absolute, Automated  0.03      Lymphocytes Absolute 1.03      Monocytes Absolute 0.43      Eosinophils Absolute 0.09      Basophils Absolute 0.02     COMPREHENSIVE METABOLIC PANEL   TYPE AND SCREEN       All other labs were within normal range or not returned as of this dictation.    Imaging  CT head wo IV contrast   Final Result   CT HEAD:   No acute intracranial abnormality or calvarial fracture.   Chronic findings.        CT CERVICAL SPINE:   No acute fracture or traumatic malalignment of the cervical spine.   Multilevel degenerative disc disease. Demineralization limits   sensitivity. Vascular calcification        Signed by: Carloz Mcconnell 12/18/2024 7:04 PM   Dictation workstation:   [id number]      CT cervical spine wo IV contrast   Final Result   CT HEAD:   No acute intracranial abnormality or calvarial fracture.   Chronic findings.        CT CERVICAL SPINE:   No acute fracture or traumatic malalignment of the cervical spine.   Multilevel degenerative disc disease. Demineralization limits   sensitivity. Vascular calcification        Signed by: Carloz Mcconnell 12/18/2024 7:04 PM   Dictation workstation:   [id number]      XR hip left with pelvis when performed 2 or 3 views   Final Result   1. Intertrochanteric left proximal femoral fracture.   2. Severe left hip joint degenerative changes with interval   progression.        MACRO:   None.        Signed by: Jeanne Bourne 12/18/2024 6:50 PM   Dictation workstation:   [id number]           Procedures  Procedures     EMERGENCY DEPARTMENT COURSE/MDM:     Diagnoses as of 12/18/24 1942   Fall, initial encounter   Closed nondisplaced intertrochanteric fracture of left femur, initial encounter        Medical Decision Making  History attained for the patient.  Records including labs, imaging, notes independently reviewed by me.  Fall was mechanical, no immediate indication for labs at this time.  Patient given Tylenol for pain with improvement.  Primary suspicion for possible  intracranial bleed, fracture of the skull, cervical spine, left hip.  CT head and cervical spine were negative for acute injury.  X-ray of the hip and pelvis demonstrated an intertrochanteric left hip fracture.  Case was discussed with Dr. Calderon of orthopedics who recommended n.p.o. at midnight, likely operative fixation tomorrow.  He was placed on consult and admitted to the medicine service under Dr. Arita.  Patient and family amenable to this plan.  Preoperative labs were obtained and pending at the time of admission.    Patient and or family in agreement and understanding of treatment plan.  All questions answered.      I reviewed the case with the attending ED physician. The attending ED physician agrees with the plan. Patient and/or patient´s representative was counseled regarding labs, imaging, likely diagnosis, and plan. All questions were answered.    ED Medications administered this visit:    Medications   acetaminophen (Tylenol) tablet 325 mg (325 mg oral Given 12/18/24 1633)       New Prescriptions from this visit:    New Prescriptions    No medications on file       Follow-up:  No follow-up provider specified.      Final Impression:   1. Fall, initial encounter    2. Closed nondisplaced intertrochanteric fracture of left femur, initial encounter          (Please note that portions of this note were completed with a voice recognition program.  Efforts were made to edit the dictations but occasionally words are mis-transcribed.)     Gary Dexter MD  Resident  12/18/24 1942

## 2024-12-18 NOTE — ED TRIAGE NOTES
Pt to ED after fall at nursing home. Pt states she was walking with her walker and fell but does not remember if she hit her head. Pt on thinners. A/ox3

## 2024-12-19 PROBLEM — S72.145A CLOSED NONDISPLACED INTERTROCHANTERIC FRACTURE OF LEFT FEMUR: Status: ACTIVE | Noted: 2024-12-18

## 2024-12-19 LAB
ANION GAP SERPL CALC-SCNC: 14 MMOL/L (ref 10–20)
BUN SERPL-MCNC: 32 MG/DL (ref 6–23)
CALCIUM SERPL-MCNC: 9.4 MG/DL (ref 8.6–10.3)
CHLORIDE SERPL-SCNC: 104 MMOL/L (ref 98–107)
CO2 SERPL-SCNC: 24 MMOL/L (ref 21–32)
CREAT SERPL-MCNC: 1.04 MG/DL (ref 0.5–1.05)
EGFRCR SERPLBLD CKD-EPI 2021: 51 ML/MIN/1.73M*2
ERYTHROCYTE [DISTWIDTH] IN BLOOD BY AUTOMATED COUNT: 12.2 % (ref 11.5–14.5)
GLUCOSE BLD MANUAL STRIP-MCNC: 118 MG/DL (ref 74–99)
GLUCOSE SERPL-MCNC: 114 MG/DL (ref 74–99)
HCT VFR BLD AUTO: 36 % (ref 36–46)
HGB BLD-MCNC: 12.2 G/DL (ref 12–16)
MCH RBC QN AUTO: 31.1 PG (ref 26–34)
MCHC RBC AUTO-ENTMCNC: 33.9 G/DL (ref 32–36)
MCV RBC AUTO: 92 FL (ref 80–100)
NRBC BLD-RTO: 0 /100 WBCS (ref 0–0)
PLATELET # BLD AUTO: 169 X10*3/UL (ref 150–450)
POTASSIUM SERPL-SCNC: 4.2 MMOL/L (ref 3.5–5.3)
RBC # BLD AUTO: 3.92 X10*6/UL (ref 4–5.2)
SODIUM SERPL-SCNC: 138 MMOL/L (ref 136–145)
WBC # BLD AUTO: 8.6 X10*3/UL (ref 4.4–11.3)

## 2024-12-19 PROCEDURE — 99223 1ST HOSP IP/OBS HIGH 75: CPT | Performed by: INTERNAL MEDICINE

## 2024-12-19 PROCEDURE — 1100000001 HC PRIVATE ROOM DAILY

## 2024-12-19 PROCEDURE — 99222 1ST HOSP IP/OBS MODERATE 55: CPT | Performed by: INTERNAL MEDICINE

## 2024-12-19 PROCEDURE — 2500000004 HC RX 250 GENERAL PHARMACY W/ HCPCS (ALT 636 FOR OP/ED): Performed by: INTERNAL MEDICINE

## 2024-12-19 PROCEDURE — 2500000002 HC RX 250 W HCPCS SELF ADMINISTERED DRUGS (ALT 637 FOR MEDICARE OP, ALT 636 FOR OP/ED): Performed by: INTERNAL MEDICINE

## 2024-12-19 PROCEDURE — 36415 COLL VENOUS BLD VENIPUNCTURE: CPT

## 2024-12-19 PROCEDURE — 2500000001 HC RX 250 WO HCPCS SELF ADMINISTERED DRUGS (ALT 637 FOR MEDICARE OP): Performed by: INTERNAL MEDICINE

## 2024-12-19 PROCEDURE — 94640 AIRWAY INHALATION TREATMENT: CPT

## 2024-12-19 PROCEDURE — 2500000004 HC RX 250 GENERAL PHARMACY W/ HCPCS (ALT 636 FOR OP/ED): Performed by: NURSE PRACTITIONER

## 2024-12-19 PROCEDURE — 82374 ASSAY BLOOD CARBON DIOXIDE: CPT

## 2024-12-19 PROCEDURE — 85027 COMPLETE CBC AUTOMATED: CPT

## 2024-12-19 PROCEDURE — 82947 ASSAY GLUCOSE BLOOD QUANT: CPT

## 2024-12-19 PROCEDURE — 2500000002 HC RX 250 W HCPCS SELF ADMINISTERED DRUGS (ALT 637 FOR MEDICARE OP, ALT 636 FOR OP/ED)

## 2024-12-19 PROCEDURE — 2500000001 HC RX 250 WO HCPCS SELF ADMINISTERED DRUGS (ALT 637 FOR MEDICARE OP)

## 2024-12-19 RX ORDER — ACETAMINOPHEN 325 MG/1
975 TABLET ORAL EVERY 8 HOURS
Status: DISCONTINUED | OUTPATIENT
Start: 2024-12-19 | End: 2024-12-27 | Stop reason: HOSPADM

## 2024-12-19 RX ORDER — ACETAMINOPHEN 10 MG/ML
1000 INJECTION, SOLUTION INTRAVENOUS ONCE
Status: COMPLETED | OUTPATIENT
Start: 2024-12-19 | End: 2024-12-19

## 2024-12-19 RX ORDER — IPRATROPIUM BROMIDE AND ALBUTEROL SULFATE 2.5; .5 MG/3ML; MG/3ML
3 SOLUTION RESPIRATORY (INHALATION) EVERY 2 HOUR PRN
Status: DISCONTINUED | OUTPATIENT
Start: 2024-12-19 | End: 2024-12-27

## 2024-12-19 RX ORDER — MAGNESIUM HYDROXIDE 2400 MG/10ML
10 SUSPENSION ORAL DAILY PRN
Status: DISCONTINUED | OUTPATIENT
Start: 2024-12-19 | End: 2024-12-27 | Stop reason: HOSPADM

## 2024-12-19 RX ORDER — MORPHINE SULFATE 2 MG/ML
2 INJECTION, SOLUTION INTRAMUSCULAR; INTRAVENOUS ONCE
Status: DISCONTINUED | OUTPATIENT
Start: 2024-12-19 | End: 2024-12-19

## 2024-12-19 RX ORDER — FUROSEMIDE 10 MG/ML
20 INJECTION INTRAMUSCULAR; INTRAVENOUS ONCE
Status: COMPLETED | OUTPATIENT
Start: 2024-12-19 | End: 2024-12-19

## 2024-12-19 RX ADMIN — ACETAMINOPHEN 975 MG: 325 TABLET, FILM COATED ORAL at 22:08

## 2024-12-19 RX ADMIN — ESCITALOPRAM OXALATE 5 MG: 10 TABLET ORAL at 08:38

## 2024-12-19 RX ADMIN — ACETAMINOPHEN 975 MG: 325 TABLET, FILM COATED ORAL at 14:42

## 2024-12-19 RX ADMIN — SENNOSIDES AND DOCUSATE SODIUM 2 TABLET: 50; 8.6 TABLET ORAL at 21:06

## 2024-12-19 RX ADMIN — ROSUVASTATIN CALCIUM 5 MG: 10 TABLET, FILM COATED ORAL at 21:06

## 2024-12-19 RX ADMIN — LOSARTAN POTASSIUM 100 MG: 50 TABLET, FILM COATED ORAL at 08:32

## 2024-12-19 RX ADMIN — FUROSEMIDE 20 MG: 10 INJECTION, SOLUTION INTRAMUSCULAR; INTRAVENOUS at 10:54

## 2024-12-19 RX ADMIN — ACETAMINOPHEN 1000 MG: 10 INJECTION INTRAVENOUS at 02:45

## 2024-12-19 RX ADMIN — POTASSIUM CHLORIDE 10 MEQ: 750 TABLET, EXTENDED RELEASE ORAL at 21:06

## 2024-12-19 RX ADMIN — IPRATROPIUM BROMIDE AND ALBUTEROL SULFATE 3 ML: .5; 3 SOLUTION RESPIRATORY (INHALATION) at 08:05

## 2024-12-19 SDOH — ECONOMIC STABILITY: FOOD INSECURITY: WITHIN THE PAST 12 MONTHS, THE FOOD YOU BOUGHT JUST DIDN'T LAST AND YOU DIDN'T HAVE MONEY TO GET MORE.: NEVER TRUE

## 2024-12-19 SDOH — SOCIAL STABILITY: SOCIAL INSECURITY
WITHIN THE LAST YEAR, HAVE YOU BEEN KICKED, HIT, SLAPPED, OR OTHERWISE PHYSICALLY HURT BY YOUR PARTNER OR EX-PARTNER?: NO

## 2024-12-19 SDOH — SOCIAL STABILITY: SOCIAL INSECURITY: WITHIN THE LAST YEAR, HAVE YOU BEEN AFRAID OF YOUR PARTNER OR EX-PARTNER?: NO

## 2024-12-19 SDOH — SOCIAL STABILITY: SOCIAL NETWORK: HOW OFTEN DO YOU ATTEND MEETINGS OF THE CLUBS OR ORGANIZATIONS YOU BELONG TO?: 1 TO 4 TIMES PER YEAR

## 2024-12-19 SDOH — SOCIAL STABILITY: SOCIAL INSECURITY: WITHIN THE LAST YEAR, HAVE YOU BEEN HUMILIATED OR EMOTIONALLY ABUSED IN OTHER WAYS BY YOUR PARTNER OR EX-PARTNER?: NO

## 2024-12-19 SDOH — ECONOMIC STABILITY: HOUSING INSECURITY: IN THE PAST 12 MONTHS, HOW MANY TIMES HAVE YOU MOVED WHERE YOU WERE LIVING?: 0

## 2024-12-19 SDOH — SOCIAL STABILITY: SOCIAL INSECURITY: WERE YOU ABLE TO COMPLETE ALL THE BEHAVIORAL HEALTH SCREENINGS?: YES

## 2024-12-19 SDOH — SOCIAL STABILITY: SOCIAL INSECURITY
WITHIN THE LAST YEAR, HAVE YOU BEEN RAPED OR FORCED TO HAVE ANY KIND OF SEXUAL ACTIVITY BY YOUR PARTNER OR EX-PARTNER?: NO

## 2024-12-19 SDOH — ECONOMIC STABILITY: FOOD INSECURITY: WITHIN THE PAST 12 MONTHS, YOU WORRIED THAT YOUR FOOD WOULD RUN OUT BEFORE YOU GOT THE MONEY TO BUY MORE.: NEVER TRUE

## 2024-12-19 SDOH — SOCIAL STABILITY: SOCIAL NETWORK: HOW OFTEN DO YOU GET TOGETHER WITH FRIENDS OR RELATIVES?: THREE TIMES A WEEK

## 2024-12-19 SDOH — SOCIAL STABILITY: SOCIAL INSECURITY: HAVE YOU HAD ANY THOUGHTS OF HARMING ANYONE ELSE?: NO

## 2024-12-19 SDOH — HEALTH STABILITY: PHYSICAL HEALTH: ON AVERAGE, HOW MANY MINUTES DO YOU ENGAGE IN EXERCISE AT THIS LEVEL?: 120 MIN

## 2024-12-19 SDOH — SOCIAL STABILITY: SOCIAL INSECURITY: ARE YOU OR HAVE YOU BEEN THREATENED OR ABUSED PHYSICALLY, EMOTIONALLY, OR SEXUALLY BY ANYONE?: NO

## 2024-12-19 SDOH — ECONOMIC STABILITY: TRANSPORTATION INSECURITY: IN THE PAST 12 MONTHS, HAS LACK OF TRANSPORTATION KEPT YOU FROM MEDICAL APPOINTMENTS OR FROM GETTING MEDICATIONS?: NO

## 2024-12-19 SDOH — ECONOMIC STABILITY: HOUSING INSECURITY: IN THE LAST 12 MONTHS, WAS THERE A TIME WHEN YOU WERE NOT ABLE TO PAY THE MORTGAGE OR RENT ON TIME?: NO

## 2024-12-19 SDOH — SOCIAL STABILITY: SOCIAL INSECURITY: ABUSE: ADULT

## 2024-12-19 SDOH — HEALTH STABILITY: PHYSICAL HEALTH
HOW OFTEN DO YOU NEED TO HAVE SOMEONE HELP YOU WHEN YOU READ INSTRUCTIONS, PAMPHLETS, OR OTHER WRITTEN MATERIAL FROM YOUR DOCTOR OR PHARMACY?: NEVER

## 2024-12-19 SDOH — SOCIAL STABILITY: SOCIAL INSECURITY: ARE YOU MARRIED, WIDOWED, DIVORCED, SEPARATED, NEVER MARRIED, OR LIVING WITH A PARTNER?: WIDOWED

## 2024-12-19 SDOH — SOCIAL STABILITY: SOCIAL NETWORK
DO YOU BELONG TO ANY CLUBS OR ORGANIZATIONS SUCH AS CHURCH GROUPS, UNIONS, FRATERNAL OR ATHLETIC GROUPS, OR SCHOOL GROUPS?: YES

## 2024-12-19 SDOH — SOCIAL STABILITY: SOCIAL INSECURITY: DO YOU FEEL ANYONE HAS EXPLOITED OR TAKEN ADVANTAGE OF YOU FINANCIALLY OR OF YOUR PERSONAL PROPERTY?: NO

## 2024-12-19 SDOH — ECONOMIC STABILITY: INCOME INSECURITY: IN THE PAST 12 MONTHS HAS THE ELECTRIC, GAS, OIL, OR WATER COMPANY THREATENED TO SHUT OFF SERVICES IN YOUR HOME?: NO

## 2024-12-19 SDOH — HEALTH STABILITY: PHYSICAL HEALTH: ON AVERAGE, HOW MANY DAYS PER WEEK DO YOU ENGAGE IN MODERATE TO STRENUOUS EXERCISE (LIKE A BRISK WALK)?: 7 DAYS

## 2024-12-19 SDOH — HEALTH STABILITY: MENTAL HEALTH
DO YOU FEEL STRESS - TENSE, RESTLESS, NERVOUS, OR ANXIOUS, OR UNABLE TO SLEEP AT NIGHT BECAUSE YOUR MIND IS TROUBLED ALL THE TIME - THESE DAYS?: ONLY A LITTLE

## 2024-12-19 SDOH — SOCIAL STABILITY: SOCIAL INSECURITY: DOES ANYONE TRY TO KEEP YOU FROM HAVING/CONTACTING OTHER FRIENDS OR DOING THINGS OUTSIDE YOUR HOME?: NO

## 2024-12-19 SDOH — ECONOMIC STABILITY: HOUSING INSECURITY: AT ANY TIME IN THE PAST 12 MONTHS, WERE YOU HOMELESS OR LIVING IN A SHELTER (INCLUDING NOW)?: NO

## 2024-12-19 SDOH — SOCIAL STABILITY: SOCIAL INSECURITY: DO YOU FEEL UNSAFE GOING BACK TO THE PLACE WHERE YOU ARE LIVING?: NO

## 2024-12-19 SDOH — SOCIAL STABILITY: SOCIAL INSECURITY: HAVE YOU HAD THOUGHTS OF HARMING ANYONE ELSE?: NO

## 2024-12-19 SDOH — HEALTH STABILITY: MENTAL HEALTH: HOW OFTEN DO YOU HAVE A DRINK CONTAINING ALCOHOL?: NEVER

## 2024-12-19 SDOH — SOCIAL STABILITY: SOCIAL NETWORK: HOW OFTEN DO YOU ATTEND CHURCH OR RELIGIOUS SERVICES?: 1 TO 4 TIMES PER YEAR

## 2024-12-19 SDOH — HEALTH STABILITY: MENTAL HEALTH: HOW MANY DRINKS CONTAINING ALCOHOL DO YOU HAVE ON A TYPICAL DAY WHEN YOU ARE DRINKING?: PATIENT DOES NOT DRINK

## 2024-12-19 SDOH — SOCIAL STABILITY: SOCIAL NETWORK: IN A TYPICAL WEEK, HOW MANY TIMES DO YOU TALK ON THE PHONE WITH FAMILY, FRIENDS, OR NEIGHBORS?: THREE TIMES A WEEK

## 2024-12-19 SDOH — SOCIAL STABILITY: SOCIAL INSECURITY: ARE THERE ANY APPARENT SIGNS OF INJURIES/BEHAVIORS THAT COULD BE RELATED TO ABUSE/NEGLECT?: NO

## 2024-12-19 SDOH — HEALTH STABILITY: MENTAL HEALTH: EXPERIENCED ANY OF THE FOLLOWING LIFE EVENTS: OTHER (COMMENT)

## 2024-12-19 SDOH — ECONOMIC STABILITY: FOOD INSECURITY: HOW HARD IS IT FOR YOU TO PAY FOR THE VERY BASICS LIKE FOOD, HOUSING, MEDICAL CARE, AND HEATING?: NOT HARD AT ALL

## 2024-12-19 SDOH — HEALTH STABILITY: MENTAL HEALTH: HOW OFTEN DO YOU HAVE SIX OR MORE DRINKS ON ONE OCCASION?: NEVER

## 2024-12-19 SDOH — SOCIAL STABILITY: SOCIAL INSECURITY: HAS ANYONE EVER THREATENED TO HURT YOUR FAMILY OR YOUR PETS?: NO

## 2024-12-19 ASSESSMENT — LIFESTYLE VARIABLES
AUDIT-C TOTAL SCORE: 0
AUDIT-C TOTAL SCORE: 0
SKIP TO QUESTIONS 9-10: 1
HOW OFTEN DO YOU HAVE 6 OR MORE DRINKS ON ONE OCCASION: NEVER
SKIP TO QUESTIONS 9-10: 1
HOW MANY STANDARD DRINKS CONTAINING ALCOHOL DO YOU HAVE ON A TYPICAL DAY: PATIENT DOES NOT DRINK
HOW OFTEN DO YOU HAVE A DRINK CONTAINING ALCOHOL: NEVER
SUBSTANCE_ABUSE_PAST_12_MONTHS: NO
AUDIT-C TOTAL SCORE: 0
PRESCIPTION_ABUSE_PAST_12_MONTHS: NO

## 2024-12-19 ASSESSMENT — ACTIVITIES OF DAILY LIVING (ADL)
JUDGMENT_ADEQUATE_SAFELY_COMPLETE_DAILY_ACTIVITIES: YES
TOILETING: NEEDS ASSISTANCE
LACK_OF_TRANSPORTATION: NO
BATHING: NEEDS ASSISTANCE
HEARING - RIGHT EAR: FUNCTIONAL
HEARING - LEFT EAR: FUNCTIONAL
DRESSING YOURSELF: NEEDS ASSISTANCE
ADEQUATE_TO_COMPLETE_ADL: YES
ASSISTIVE_DEVICE: WALKER
PATIENT'S MEMORY ADEQUATE TO SAFELY COMPLETE DAILY ACTIVITIES?: YES
FEEDING YOURSELF: NEEDS ASSISTANCE
GROOMING: NEEDS ASSISTANCE
LACK_OF_TRANSPORTATION: NO
WALKS IN HOME: NEEDS ASSISTANCE

## 2024-12-19 ASSESSMENT — PAIN DESCRIPTION - ORIENTATION: ORIENTATION: LEFT

## 2024-12-19 ASSESSMENT — COGNITIVE AND FUNCTIONAL STATUS - GENERAL
TOILETING: A LITTLE
DAILY ACTIVITIY SCORE: 19
STANDING UP FROM CHAIR USING ARMS: A LITTLE
HELP NEEDED FOR BATHING: A LITTLE
MOVING FROM LYING ON BACK TO SITTING ON SIDE OF FLAT BED WITH BEDRAILS: A LITTLE
WALKING IN HOSPITAL ROOM: A LITTLE
MOVING TO AND FROM BED TO CHAIR: A LITTLE
PATIENT BASELINE BEDBOUND: NO
DRESSING REGULAR UPPER BODY CLOTHING: A LITTLE
PERSONAL GROOMING: A LITTLE
DRESSING REGULAR LOWER BODY CLOTHING: A LITTLE
MOBILITY SCORE: 17
TURNING FROM BACK TO SIDE WHILE IN FLAT BAD: A LITTLE
CLIMB 3 TO 5 STEPS WITH RAILING: A LOT

## 2024-12-19 ASSESSMENT — PAIN SCALES - GENERAL
PAINLEVEL_OUTOF10: 5 - MODERATE PAIN
PAINLEVEL_OUTOF10: 8
PAINLEVEL_OUTOF10: 0 - NO PAIN
PAINLEVEL_OUTOF10: 3

## 2024-12-19 ASSESSMENT — PAIN SCALES - WONG BAKER
WONGBAKER_NUMERICALRESPONSE: HURTS WHOLE LOT
WONGBAKER_NUMERICALRESPONSE: HURTS LITTLE BIT
WONGBAKER_NUMERICALRESPONSE: HURTS WHOLE LOT

## 2024-12-19 ASSESSMENT — PATIENT HEALTH QUESTIONNAIRE - PHQ9
2. FEELING DOWN, DEPRESSED OR HOPELESS: NOT AT ALL
1. LITTLE INTEREST OR PLEASURE IN DOING THINGS: NOT AT ALL
SUM OF ALL RESPONSES TO PHQ9 QUESTIONS 1 & 2: 0

## 2024-12-19 ASSESSMENT — PAIN DESCRIPTION - LOCATION: LOCATION: LEG

## 2024-12-19 NOTE — PROGRESS NOTES
Occupational Therapy                 Therapy Communication Note    Patient Name: Ermelinda Benoit  MRN: [id number]  Department: [facility]  Room: [facility]  Today's Date: 12/19/2024     Discipline: Occupational Therapy    OT Missed Visit: Yes     Missed Visit Reason: Missed Visit Reason: Patient placed on medical hold (per ortho note: IMP- LEFT HIP FX     PLAN - REC IM NAIL.  defer OT eval, await post op orders)

## 2024-12-19 NOTE — PROGRESS NOTES
Pharmacy Medication History Review    Ermelinda Benoit is a 89 y.o. female admitted for Fall, initial encounter. Pharmacy reviewed the patient's [id number] medications and allergies for accuracy.    The list below reflectives the updated PTA list. Please review each medication in order reconciliation for additional clarification and justification.  Prior to Admission medications    Medication Sig Start Date End Date Authorizing Provider   acetaminophen (Tylenol) 325 mg tablet Take 2 tablets (650 mg) by mouth 2 times a day.   Historical Provider, MD   apixaban (Eliquis) 2.5 mg tablet Take 1 tablet (2.5 mg) by mouth 2 times a day.   Ej Hoffman MD PhD   cholecalciferol (Vitamin D-3) 50 mcg (2,000 unit) capsule Take 1 capsule (50 mcg) by mouth once daily.   Historical Provider, MD   escitalopram (Lexapro) 5 mg tablet Take 1 tablet (5 mg) by mouth once daily.   Historical Provider, MD   losartan (Cozaar) 100 mg tablet Take 1 tablet (100 mg) by mouth once daily.   Ej Hoffman MD PhD   potassium chloride CR 10 mEq ER tablet Take 1 tablet (10 mEq) by mouth 2 times a day.   Historical Provider, MD   rosuvastatin (Crestor) 5 mg tablet Take 1 tablet (5 mg) by mouth once daily at bedtime.   Historical Provider, MD   sennosides-docusate sodium (Martha-Colace) 8.6-50 mg tablet Take 2 tablets by mouth once daily at bedtime.   Historical Provider, MD   torsemide (Demadex) 20 mg tablet Take 1.5 tablets (30 mg) by mouth once daily.   Ej Hoffman MD PhD   bisacodyl (Dulcolax) 10 mg suppository Insert 1 suppository (10 mg) into the rectum once daily as needed for constipation.   Historical Provider, MD   guaiFENesin (Mucinex) 600 mg 12 hr tablet Take 2 tablets (1,200 mg) by mouth 2 times a day as needed for cough. Do not crush, chew, or split.   Historical Provider, MD   ipratropium-albuteroL (Duo-Neb) 0.5-2.5 mg/3 mL nebulizer solution Take 3 mL by nebulization every 4 hours. While awake   Historical  Provider, MD   magnesium hydroxide (Milk of Magnesia) 400 mg/5 mL suspension Take 30 mL by mouth once daily as needed for constipation.   Historical Provider, MD   melatonin 3 mg tablet Take 1 tablet (3 mg) by mouth as needed at bedtime for sleep.   Historical Provider, MD   mineral oil enema Insert 118 mL into the rectum once daily as needed for constipation.   Historical Provider, MD   polyethylene glycol (Glycolax, Miralax) 17 gram packet Take 17 g by mouth once daily as needed (constipation).   Historical Provider, MD        The list below reflectives the updated allergy list. Please review each documented allergy for additional clarification and justification.  Allergies  Reviewed by Regina Fernandez on 12/18/2024        Severity Reactions Comments    Codeine Medium GI Upset     Iodinated Contrast Media Medium Hives     Macrolide Antibiotics Medium Rash     Moxifloxacin Medium Swelling     Nitrofurantoin Medium Diarrhea     Penicillins Medium Hives     Adhesive Not Specified Unknown     Propoxyphene Not Specified Unknown     Tetracycline Not Specified Unknown     Erythromycin Low Rash     Sulfa (sulfonamide Antibiotics) Low Other Affects eyes            Below are additional concerns with the patient's PTA list.      Regina Fernandez

## 2024-12-19 NOTE — PROGRESS NOTES
Physical Therapy                 Therapy Communication Note    Patient Name: Ermelinda Benoit  MRN: [id number]  Department: [facility]  Room: [facility]717-  Today's Date: 12/19/2024     Discipline: Physical Therapy    PT Missed Visit: Yes     Missed Visit Reason: Missed Visit Reason: Patient placed on medical hold (Pt admitted with acute L intertrochanteric proximal femoral fracture with plan for surgery 12/19. Will hold until surgery complete.)    Missed Time: Attempt

## 2024-12-19 NOTE — CONSULTS
Inpatient consult to Cardiology  Consult performed by: Jean Guardado DO  Consult ordered by: MARK Perez-CNP  Reason for consult: Preop        History Of Present Illness:    Ermelinda Benoit is a 89 y.o. female with history of permanent atrial fibrillation on Eliquis, status post 2018 TAVR for aortic stenosis (Evolute 26), heart failure with preserved ejection action, hypertension, stroke, hyperlipidemia presenting with hip fracture and now needs open reduction internal fixation and cardiology was consulted for preop eval.  Patient reports that she tripped over her walker-she did not have any near-syncope or syncope.  She does admit to some shortness of breath.  She sometimes has palpitations lying down at night.  Overall activity is limited as she walks with a walker lives in a nursing home.  No recent fever or chills or cough or bleeding abnormalities     Last Recorded Vitals:  Vitals:    12/18/24 2332 12/19/24 0411 12/19/24 0737 12/19/24 0805   BP: 172/73 149/74 (!) 154/96    BP Location: Left arm Left arm Left arm    Patient Position: Lying Lying Lying    Pulse: 73 76     Resp: 16 16     Temp: 37.1 °C (98.8 °F) 37.5 °C (99.5 °F) 37.5 °C (99.5 °F)    TempSrc: Temporal Temporal Temporal    SpO2: 90% (!) 88% (!) 88% 90%   Weight:       Height:           Last Labs:  CBC - 12/19/2024:  6:06 AM  8.6 12.2 169    36.0      CMP - 12/19/2024:  6:06 AM  9.4 8.4 22 --- 1.5   _ 4.7 19 87      PTT - No results in last year.  1.3   14.2 _     Troponin I   Date/Time Value Ref Range Status   04/26/2023 04:14 PM 56 (HH) 0 - 13 ng/L Final     Comment:     .  Less than 99th percentile of normal range cutoff-  Female and children under 18 years old <14 ng/L; Male <21 ng/L: Negative  Repeat testing should be performed if clinically indicated.   .  Female and children under 18 years old 14-50 ng/L; Male 21-50 ng/L:  Consistent with possible cardiac damage and possible increased clinical   risk. Serial measurements may  help to assess extent of myocardial damage.   .  >50 ng/L: Consistent with cardiac damage, increased clinical risk and  myocardial infarction. Serial measurements may help assess extent of   myocardial damage.   .   NOTE: Children less than 1 year old may have higher baseline troponin   levels and results should be interpreted in conjunction with the overall   clinical context.   .  NOTE: Troponin I testing is performed using a different   testing methodology at [facility] than at other   system hospitals. Direct result comparisons should only   be made within the same method.  RESULTS RB TO BAR LAYLA, 04/26/2023 17:20     04/26/2023 09:57 AM 36 (H) 0 - 13 ng/L Final     Comment:     .  Less than 99th percentile of normal range cutoff-  Female and children under 18 years old <14 ng/L; Male <21 ng/L: Negative  Repeat testing should be performed if clinically indicated.   .  Female and children under 18 years old 14-50 ng/L; Male 21-50 ng/L:  Consistent with possible cardiac damage and possible increased clinical   risk. Serial measurements may help to assess extent of myocardial damage.   .  >50 ng/L: Consistent with cardiac damage, increased clinical risk and  myocardial infarction. Serial measurements may help assess extent of   myocardial damage.   .   NOTE: Children less than 1 year old may have higher baseline troponin   levels and results should be interpreted in conjunction with the overall   clinical context.   .  NOTE: Troponin I testing is performed using a different   testing methodology at [facility] than at other   [facility]. Direct result comparisons should only   be made within the same method.     04/22/2023 02:40 PM 23 (H) 0 - 13 ng/L Final     Comment:     .  Less than 99th percentile of normal range cutoff-  Female and children under 18 years old <14 ng/L; Male <21 ng/L: Negative  Repeat testing should be performed if clinically indicated.   .  Female and  children under 18 years old 14-50 ng/L; Male 21-50 ng/L:  Consistent with possible cardiac damage and possible increased clinical   risk. Serial measurements may help to assess extent of myocardial damage.   .  >50 ng/L: Consistent with cardiac damage, increased clinical risk and  myocardial infarction. Serial measurements may help assess extent of   myocardial damage.   .   NOTE: Children less than 1 year old may have higher baseline troponin   levels and results should be interpreted in conjunction with the overall   clinical context.   .  NOTE: Troponin I testing is performed using a different   testing methodology at [facility] than at other   [facility]. Direct result comparisons should only   be made within the same method.       BNP   Date/Time Value Ref Range Status   12/18/2024 07:15  (H) 0 - 99 pg/mL Final   04/26/2023 09:57  (H) 0 - 99 pg/mL Final     Comment:     .  <100 pg/mL - Heart failure unlikely  100-299 pg/mL - Intermediate probability of acute heart  .               failure exacerbation. Correlate with clinical  .               context and patient history.    >=300 pg/mL - Heart Failure likely. Correlate with clinical  .               context and patient history.  BNP testing is performed using different testing   methodology at [facility] than at other   [facility]. Direct result comparisons should   only be made within the same method.     04/11/2019 09:56  (H) 0 - 99 pg/mL Final     Comment:     .  <100 pg/mL - Heart failure unlikely  100-299 pg/mL - Intermediate probability of acute heart  .               failure exacerbation. Correlate with clinical  .               context and patient history.    >=300 pg/mL - Heart Failure likely. Correlate with clinical  .               context and patient history.  BNP testing is performed using different testing   methodology at [facility] than at other   [facility]. Direct  result comparisons should   only be made within the same method.        Last I/O:  I/O last 3 completed shifts:  In: - (0 mL/kg)   Out: 300 (5.5 mL/kg) [Urine:300 (0.2 mL/kg/hr)]  Weight: 54.4 kg     Past Cardiology Tests (Last 3 Years):  EKG:  EKG with atrial fibrillation with occasional PVCs, right axis deviation, cannot rule out prior lateral MI, nonspecific ST-T changes  Echo:  Transthoracic Echo (TTE) Complete 05/21/2024  TAVR functioning normally  Ejection Fractions:  55 to 60%  Cath:  No results found for this or any previous visit from the past 1095 days.    Stress Test:  No results found for this or any previous visit from the past 1095 days.    Cardiac Imaging:  Chest x-ray  1.  Slightly increased interstitial lung markings may represent mild  edema, viral bronchiolitis, or chronic airways disease.  2. Stable retrocardiac opacity, likely representing chronic  atelectasis.  3. Cardiomegaly with mild venous congestion.    Past Medical History:  She has a past medical history of Personal history of malignant neoplasm of breast (01/09/2018), Personal history of other medical treatment, Personal history of other medical treatment, and Unspecified nondisplaced fracture of fifth cervical vertebra, initial encounter for closed fracture (11/15/2016).    Past Surgical History:  She has a past surgical history that includes Other surgical history (01/09/2018); Cataract extraction (01/23/2018); Total hip arthroplasty (03/09/2018); Colonoscopy (03/09/2018); Breast lumpectomy (03/09/2018); and Other surgical history (03/30/2021).      Social History:  She reports that she has never smoked. She has never used smokeless tobacco. No history on file for alcohol use and drug use.    Family History:  Family History   Problem Relation Name Age of Onset    Other (cardiac disorder) Mother      Diabetes Father          Allergies:  Codeine, Iodinated contrast media, Macrolide antibiotics, Moxifloxacin, Nitrofurantoin, Penicillins,  Adhesive, Propoxyphene, Tetracycline, Erythromycin, and Sulfa (sulfonamide antibiotics)    Inpatient Medications:  Scheduled medications   Medication Dose Route Frequency    [Held by provider] apixaban  2.5 mg oral BID    cholecalciferol  2,000 Units oral Daily    escitalopram  5 mg oral Daily    ipratropium-albuteroL  3 mL nebulization TID    losartan  100 mg oral Daily    potassium chloride CR  10 mEq oral BID    rosuvastatin  5 mg oral Nightly    sennosides-docusate sodium  2 tablet oral Nightly    torsemide  30 mg oral Daily     PRN medications   Medication    bisacodyl    guaiFENesin    magnesium hydroxide    melatonin    ondansetron    polyethylene glycol     Continuous Medications   Medication Dose Last Rate     Outpatient Medications:  Current Outpatient Medications   Medication Instructions    acetaminophen (TYLENOL) 650 mg, 2 times daily    apixaban (ELIQUIS) 2.5 mg, oral, 2 times daily    bisacodyl (DULCOLAX) 10 mg, rectal, Daily PRN    cholecalciferol (VITAMIN D-3) 2,000 Units, Daily    escitalopram (LEXAPRO) 5 mg, Daily    guaiFENesin (MUCINEX) 1,200 mg, oral, 2 times daily PRN, Do not crush, chew, or split.    ipratropium-albuteroL (Duo-Neb) 0.5-2.5 mg/3 mL nebulizer solution 3 mL, nebulization, Every 4 hours, While awake    losartan (COZAAR) 100 mg, oral, Daily    magnesium hydroxide (Milk of Magnesia) 400 mg/5 mL suspension 30 mL, oral, Daily PRN    melatonin 3 mg, oral, Nightly PRN    mineral oil enema 118 mL, rectal, Daily PRN    polyethylene glycol (GLYCOLAX, MIRALAX) 17 g, oral, Daily PRN    potassium chloride CR 10 mEq ER tablet 10 mEq, 2 times daily    rosuvastatin (Crestor) 5 mg tablet 1 tablet, Nightly    sennosides-docusate sodium (Martha-Colace) 8.6-50 mg tablet 2 tablets, Nightly    torsemide (DEMADEX) 30 mg, oral, Daily       Physical Exam:  PHYSICAL EXAM:    Constitutional/General: Frail, elderly female who is alert and oriented and answers questions appropriately.  She is mildly increased  work of respiration.   Neck: Moderately increased JVP,no carotid bruits.  Respiratory:  Lungs with diminished breath sounds.  Mild respiratory distress  Cardiovascular: Heart is irregular irregular rhythm with rate controlled with a 1/6 systolic ejection murmur at the right upper sternal border.  No gallops, or rubs, PMI nondisplaced, 2+ distal pulses  Chest:  No chest wall tenderness  GI:  Abdomen soft, nontender, nondistended, + BS, no organomegaly, no palpable masses, no rebound, guarding, or rigidity  Musculoskeletal: Decreased mobility of left lower extremity  Extremities: Mild pretibial pitting peripheral edema  Integument: Skin warm and dry, no rashes  Neurologic:  No focal deficits  Psychiatric:  Normal affect    Vital Signs, Nursing Notes, Allergies, and Medications reviewed by me.     Assessment/Plan   Ermelinda Benoit is a 89 y.o. female with history of permanent atrial fibrillation on Eliquis, status post 2018 TAVR for aortic stenosis (Evolute 26), heart failure with preserved ejection action, hypertension, stroke, hyperlipidemia presenting with hip fracture and now needs open reduction internal fixation and cardiology was consulted for preop eval.  1.  Heart failure preserved ejection fraction-per exam and chest x-ray and BNP patient is mildly volume overloaded.  Will stop her IV fluids and give a dose of IV Lasix.  2.  Preop evaluation-patient in need of open reduction total fixation of left hip.  Per the revised cardiac risk index she would be at moderate risk for cardiac morbidity and mortality.  Currently she does appear to have some mild CHF therefore stopping her IV fluids given a dose of IV Lasix.  She can go to surgery when her respiratory status is stable.  Possibly later today if the Lasix kicks in appropriately, otherwise may have to wait for tomorrow  3.  Status post TAVR-stable per exam and 5/2024 echo  Peripheral IV 12/18/24 20 G 3 cm Left;Proximal Forearm (Active)   Site Assessment  Clean;Dry 12/19/24 0900   Dressing Status Clean;Dry 12/19/24 0900   Number of days: 1       Code Status:  Full Code            Jean Guardado, DO

## 2024-12-19 NOTE — CARE PLAN
The patient's goals for the shift include sleep    The clinical goals for the shift include pain management; o2 weaning      Problem: Pain  Goal: Takes deep breaths with improved pain control throughout the shift  Outcome: Progressing  Goal: Turns in bed with improved pain control throughout the shift  Outcome: Progressing  Goal: Walks with improved pain control throughout the shift  Outcome: Progressing  Goal: Performs ADL's with improved pain control throughout shift  Outcome: Progressing  Goal: Participates in PT with improved pain control throughout the shift  Outcome: Progressing  Goal: Free from opioid side effects throughout the shift  Outcome: Progressing  Goal: Free from acute confusion related to pain meds throughout the shift  Outcome: Progressing     Problem: Skin  Goal: Decreased wound size/increased tissue granulation at next dressing change  Outcome: Progressing  Goal: Participates in plan/prevention/treatment measures  Outcome: Progressing  Goal: Prevent/manage excess moisture  Outcome: Progressing  Goal: Prevent/minimize sheer/friction injuries  Outcome: Progressing  Goal: Promote/optimize nutrition  Outcome: Progressing  Goal: Promote skin healing  Outcome: Progressing

## 2024-12-19 NOTE — H&P
History Of Present Illness  Ermelinda Benoit is a 89-year-old female who presents to the ED after a fall.  Patient has a past medical history of HTN, A-fib on Eliquis, CHF, CVA, COPD, aortic stenosis with TAVR, artery embolism, and right breast cancer with lumpectomy.  Per ED note patient was at her nursing facility clear [place] when she lost her balance and fell onto her left side.  Patient reports she was walking with her walker, denies any dizziness at the time, reports that she tripped and was unable to regain her balance.  POA, daughter Alexus at bedside reports that patient has recently had increasing shortness of breath as well, which she has been evaluated for cardiology and everything was noted to be okay.  Daughter believes possible shortness of breath is related to patient's anxiety when she gets worked up.  Patient follows with Dr. Hoffman for cardiology.  Patient has not seen pulmonology for diagnosis of COPD.  Daughter says she was unaware of the COPD diagnosis until recently, but does not require oxygen at home.  Per daughter patient reports sometimes that she is dizzy but she does not have enough fluid intake and symptoms usually subside when daughter can get her to take in enough fluids.  Patient denies any fevers, cough, chest pain, vomiting, abdominal pain, urinary symptoms, or open skin ulcers.     ED Course      Hemodynamically Stable.    Vitals: Temp. 36.2, HR 80, Resp.  22, /83, Pulse ox 90% RA     Labs: Glucose 89, Na+ 140, K+ 4.2, Bun 30, Creat.  0.93 GFR 58, Ca 9.8, Albumin 4.7, Alk Phos. 87, ALT 19, AST 22, INR 1.3, WBC 6.2,  Hgb.  13.5 Hct.  40.6,      Medications: Tylenol      Imaging: interpreted by radiologist.  CT cervical spine: No acute fracture or traumatic malalignment of the cervical spine.  CT head: No acute intracranial abnormality or calvarial fracture.  X-ray hip left: Intertrochanteric left proximal femoral fracture.  Severe left hip joint degenerative changes with  interval progression.  EKG: Not available for my review.          Past Medical History  Past Medical History:   Diagnosis Date    Personal history of malignant neoplasm of breast 01/09/2018    History of malignant neoplasm of breast    Personal history of other medical treatment     History of cardiac monitoring    Personal history of other medical treatment     History of echocardiogram    Unspecified nondisplaced fracture of fifth cervical vertebra, initial encounter for closed fracture 11/15/2016    Closed nondisplaced fracture of fifth cervical vertebra, unspecified fracture morphology, initial encounter       Surgical History  Past Surgical History:   Procedure Laterality Date    BREAST LUMPECTOMY  03/09/2018    Breast Surgery Lumpectomy    CATARACT EXTRACTION  01/23/2018    Cataract Surgery    COLONOSCOPY  03/09/2018    Colonoscopy (Fiberoptic)    OTHER SURGICAL HISTORY  01/09/2018    Therapeutic Cystoscopy    OTHER SURGICAL HISTORY  03/30/2021    Transcatheter aortic valve replacement    TOTAL HIP ARTHROPLASTY  03/09/2018    Hip Replacement        Social History  Patient reports that she has never smoked, or used any drugs including marijuana.  Patient does report a 1-2 times year use of alcohol on holidays.  Patient lives at [facility], ambulates with a walker.    Family History  Family History   Problem Relation Name Age of Onset    Other (cardiac disorder) Mother      Diabetes Father          Allergies  Codeine, Iodinated contrast media, Macrolide antibiotics, Moxifloxacin, Nitrofurantoin, Penicillins, Adhesive, Propoxyphene, Tetracycline, Erythromycin, and Sulfa (sulfonamide antibiotics)    Review of Systems     10 point ROS systems completed and is negative except for what is stated in HPI.     Physical Exam  Constitutional:       General: She is awake. She is not in acute distress.     Appearance: Normal appearance. She is underweight. She is not toxic-appearing.      Interventions: Nasal cannula  in place.   HENT:      Head: Normocephalic and atraumatic.      Nose: Nose normal. No rhinorrhea.      Mouth/Throat:      Mouth: Mucous membranes are moist.   Eyes:      General:         Right eye: No discharge.         Left eye: No discharge.      Conjunctiva/sclera: Conjunctivae normal.      Pupils: Pupils are equal, round, and reactive to light.   Cardiovascular:      Rate and Rhythm: Normal rate and regular rhythm.      Pulses: Normal pulses.           Dorsalis pedis pulses are 2+ on the right side and 2+ on the left side.   Pulmonary:      Effort: Pulmonary effort is normal. Tachypnea present. No respiratory distress.      Breath sounds: Examination of the right-lower field reveals decreased breath sounds. Examination of the left-lower field reveals decreased breath sounds. Decreased breath sounds present. No wheezing.   Abdominal:      General: Bowel sounds are normal. There is no distension.      Palpations: Abdomen is soft.      Tenderness: There is no abdominal tenderness. There is no guarding.   Musculoskeletal:         General: Normal range of motion.      Cervical back: Normal range of motion and neck supple.      Right lower le+ Edema present.      Left lower leg: Edema present.      Comments: Left leg shortened and externally rotated.  Pain in left hip with palpation.  Pain with movement.  Able to wiggle toes bilateral.  Pulses palpable, dorsalis pedis bilateral feet.   Skin:     General: Skin is warm and dry.   Neurological:      General: No focal deficit present.      Mental Status: She is alert and oriented to person, place, and time. Mental status is at baseline.      Motor: No weakness.   Psychiatric:         Mood and Affect: Mood normal.         Behavior: Behavior normal.          Last Recorded Vitals  Blood pressure 170/79, pulse 77, temperature 36.2 °C (97.2 °F), resp. rate (!) 22, SpO2 (!) 92%.    Relevant Results  Results for orders placed or performed during the hospital encounter of  12/18/24 (from the past 24 hours)   CBC and Auto Differential   Result Value Ref Range    WBC 6.2 4.4 - 11.3 x10*3/uL    nRBC 0.0 0.0 - 0.0 /100 WBCs    RBC 4.38 4.00 - 5.20 x10*6/uL    Hemoglobin 13.5 12.0 - 16.0 g/dL    Hematocrit 40.6 36.0 - 46.0 %    MCV 93 80 - 100 fL    MCH 30.8 26.0 - 34.0 pg    MCHC 33.3 32.0 - 36.0 g/dL    RDW 12.2 11.5 - 14.5 %    Platelets 183 150 - 450 x10*3/uL    Neutrophils % 74.4 40.0 - 80.0 %    Immature Granulocytes %, Automated 0.5 0.0 - 0.9 %    Lymphocytes % 16.5 13.0 - 44.0 %    Monocytes % 6.9 2.0 - 10.0 %    Eosinophils % 1.4 0.0 - 6.0 %    Basophils % 0.3 0.0 - 2.0 %    Neutrophils Absolute 4.63 1.60 - 5.50 x10*3/uL    Immature Granulocytes Absolute, Automated 0.03 0.00 - 0.50 x10*3/uL    Lymphocytes Absolute 1.03 0.80 - 3.00 x10*3/uL    Monocytes Absolute 0.43 0.05 - 0.80 x10*3/uL    Eosinophils Absolute 0.09 0.00 - 0.40 x10*3/uL    Basophils Absolute 0.02 0.00 - 0.10 x10*3/uL   Comprehensive metabolic panel   Result Value Ref Range    Glucose 89 74 - 99 mg/dL    Sodium 140 136 - 145 mmol/L    Potassium 4.2 3.5 - 5.3 mmol/L    Chloride 101 98 - 107 mmol/L    Bicarbonate 26 21 - 32 mmol/L    Anion Gap 17 10 - 20 mmol/L    Urea Nitrogen 30 (H) 6 - 23 mg/dL    Creatinine 0.93 0.50 - 1.05 mg/dL    eGFR 58 (L) >60 mL/min/1.73m*2    Calcium 9.8 8.6 - 10.3 mg/dL    Albumin 4.7 3.4 - 5.0 g/dL    Alkaline Phosphatase 87 33 - 136 U/L    Total Protein 8.4 (H) 6.4 - 8.2 g/dL    AST 22 9 - 39 U/L    Bilirubin, Total 1.5 (H) 0.0 - 1.2 mg/dL    ALT 19 7 - 45 U/L   Protime-INR   Result Value Ref Range    Protime 14.2 (H) 9.8 - 12.8 seconds    INR 1.3 (H) 0.9 - 1.1   Type And Screen   Result Value Ref Range    ABO TYPE A     Rh TYPE NEG     ANTIBODY SCREEN NEG    PST Top   Result Value Ref Range    Extra Tube Hold for add-ons.      CT head wo IV contrast    Result Date: 12/18/2024  Interpreted By:  Carloz Mcconnell, STUDY: CT HEAD WO IV CONTRAST; CT CERVICAL SPINE WO IV CONTRAST;  12/18/2024  6:43 pm   INDICATION: Signs/Symptoms:HIA, distracting injuries, no focal neurologic deficits or physical exam findings on head/c spine   COMPARISON: 07/07/2023   ACCESSION NUMBER(S): [id number]; [id number]   ORDERING CLINICIAN: BISI CHI   TECHNIQUE: Axial noncontrast CT images of head with coronal and sagittal reconstructed images. Axial noncontrast CT images of the cervical spine with coronal and sagittal reconstructed images.   FINDINGS: Demineralization limits sensitivity   CT HEAD:   Left frontal and left occipital encephalomalacia again seen. Periventricular low attenuation compatible with moderate chronic small vessel ischemic change. No acute hemorrhage. No mass effect Global volume loss. No acute hemorrhage. Vascular calcification. Paranasal sinuses and mastoids otherwise clear.   CT CERVICAL SPINE:   Scattered multilevel degenerative disc disease is seen. Demineralization. No findings of acute cervical spine fracture. Vascular calcification.       CT HEAD: No acute intracranial abnormality or calvarial fracture. Chronic findings.   CT CERVICAL SPINE: No acute fracture or traumatic malalignment of the cervical spine. Multilevel degenerative disc disease. Demineralization limits sensitivity. Vascular calcification   Signed by: Carloz Mcconnell 12/18/2024 7:04 PM Dictation workstation:   [id number]    CT cervical spine wo IV contrast    Result Date: 12/18/2024  Interpreted By:  Carloz Mcconnell, STUDY: CT HEAD WO IV CONTRAST; CT CERVICAL SPINE WO IV CONTRAST;  12/18/2024 6:43 pm   INDICATION: Signs/Symptoms:HIA, distracting injuries, no focal neurologic deficits or physical exam findings on head/c spine   COMPARISON: 07/07/2023   ACCESSION NUMBER(S): [id number]; [id number]   ORDERING CLINICIAN: BISI CHI   TECHNIQUE: Axial noncontrast CT images of head with coronal and sagittal reconstructed images. Axial noncontrast CT images of the cervical spine with coronal and sagittal reconstructed  images.   FINDINGS: Demineralization limits sensitivity   CT HEAD:   Left frontal and left occipital encephalomalacia again seen. Periventricular low attenuation compatible with moderate chronic small vessel ischemic change. No acute hemorrhage. No mass effect Global volume loss. No acute hemorrhage. Vascular calcification. Paranasal sinuses and mastoids otherwise clear.   CT CERVICAL SPINE:   Scattered multilevel degenerative disc disease is seen. Demineralization. No findings of acute cervical spine fracture. Vascular calcification.       CT HEAD: No acute intracranial abnormality or calvarial fracture. Chronic findings.   CT CERVICAL SPINE: No acute fracture or traumatic malalignment of the cervical spine. Multilevel degenerative disc disease. Demineralization limits sensitivity. Vascular calcification   Signed by: Carloz Mcconnell 12/18/2024 7:04 PM Dictation workstation:   [id number]    XR hip left with pelvis when performed 2 or 3 views    Result Date: 12/18/2024  Interpreted By:  Jeanne Bourne, STUDY: Single view pelvis. Left hip, two views.   INDICATION: Signs/Symptoms:left hip pain after fall.   COMPARISON: Radiographs 02/17/2009.   ACCESSION NUMBER(S): [id number]   ORDERING CLINICIAN: BISI CHI   FINDINGS: There is intertrochanteric left proximal femoral fracture, paroxysmal appreciated on the frogleg lateral view. Bones appear demineralized. Severe left hip joint degenerative changes with interval progression. Right hip arthroplasty hardware is intact in the AP view. Bones appear demineralized. Soft tissues are within normal limits.       1. Intertrochanteric left proximal femoral fracture. 2. Severe left hip joint degenerative changes with interval progression.   MACRO: None.   Signed by: Jeanne Bourne 12/18/2024 6:50 PM Dictation workstation:   [id number]        Assessment/Plan   Assessment & Plan  Fall, initial encounter      Patient admitted today after mechanical fall while walking  with her walker.  Patient reports she tripped and was unable to regain her balance.  Patient alert and oriented, denies LOC, or hitting her head.  CT scans negative in ED for acute abnormality.  X-ray of left hip showed intertrochanteric left proximal femoral fracture.  Orthopedic consult in place.  Due to patient's cardiac history cardiology consult placed for surgery clearance.  One-time dose of Toradol ordered for pain as patient's daughter states patient cannot tolerate narcotics and becomes difficult to manage when given.  Daughter is Alexus blankenship, she is POA and would like to be consulted/called when patient is getting ready for surgery.  Her phone number is [phone].     Patient admitted to Dr. Mckay Arita for further medical management.      # Intertrochanteric left proximal femoral fracture  # Mechanical fall  # Dehydration  # Increased back/leg pain  -Consult to orthopedic surgery  -Cardiology consult placed for surgery clearance  -Type and screen/coag labs completed  -Chest x-ray ordered  -As needed antiemetics  -As needed analgesics  -EKG ordered, and as needed  -As needed oxygen >92%  -PT/OT/SW   -Fall precautions  -NPO diet, effective midnight for procedure.  -LR at 50 mL/HR  -admitted to inpatient   -q4 vitals   -morning labs, CBC, BMP     Chronic Conditions   # CVA  # COPD  # CHF  # A-fib on Eliquis  # Breast CA with lumpectomy  # Hypertension     Continue home medications as ordered   Full Code      #DVT Prophylaxis   Scd's as tolerated   DVT Prophylaxis on Eliquis, hold for procedure.      I spent 45 minutes in the professional and overall care of this patient.      MARK Perez-CNP

## 2024-12-19 NOTE — CONSULTS
"Reason For Consult  LEFT HIP FX    History Of Present Illness  Ermelinda Benoit is a 89 y.o. female presenting with TRIP AND FALL IN ECF SUSTAINING LEFT HIP FX.  USES WALKER.  C/O PAIN     Past Medical History  She has a past medical history of Personal history of malignant neoplasm of breast (01/09/2018), Personal history of other medical treatment, Personal history of other medical treatment, and Unspecified nondisplaced fracture of fifth cervical vertebra, initial encounter for closed fracture (11/15/2016).    Surgical History  She has a past surgical history that includes Other surgical history (01/09/2018); Cataract extraction (01/23/2018); Total hip arthroplasty (03/09/2018); Colonoscopy (03/09/2018); Breast lumpectomy (03/09/2018); and Other surgical history (03/30/2021).     Social History  She reports that she has never smoked. She has never used smokeless tobacco. No history on file for alcohol use and drug use.    Family History  Family History   Problem Relation Name Age of Onset    Other (cardiac disorder) Mother      Diabetes Father          Allergies  Codeine, Iodinated contrast media, Macrolide antibiotics, Moxifloxacin, Nitrofurantoin, Penicillins, Adhesive, Propoxyphene, Tetracycline, Erythromycin, and Sulfa (sulfonamide antibiotics)    Review of Systems  TOOK ELEIQUIS YEST AM     Physical Exam  NV INTACT LEFT LE     Last Recorded Vitals  Blood pressure (!) 154/96, pulse 76, temperature 37.5 °C (99.5 °F), resp. rate 16, height 1.575 m (5' 2\"), weight 54.4 kg (120 lb), SpO2 (!) 88%.    Relevant Results  XRAYS MINIMALLY DISPACED INTERTROCH FX ON LEFT  HGB 12.2  INR 1.3       Assessment/Plan     IMP- LEFT HIP FX    PLAN - REC IM NAIL.  SURGERY, RISKS AND RECOVERY WERE DISCUSSED WITH PT    NPO AND PLAN TODAY IF MEDICALLY OK    THANK YOU      Willi Calderon MD    "

## 2024-12-20 LAB
ANION GAP SERPL CALC-SCNC: 16 MMOL/L (ref 10–20)
BUN SERPL-MCNC: 26 MG/DL (ref 6–23)
CALCIUM SERPL-MCNC: 9.2 MG/DL (ref 8.6–10.3)
CHLORIDE SERPL-SCNC: 105 MMOL/L (ref 98–107)
CO2 SERPL-SCNC: 23 MMOL/L (ref 21–32)
CREAT SERPL-MCNC: 0.9 MG/DL (ref 0.5–1.05)
EGFRCR SERPLBLD CKD-EPI 2021: 60 ML/MIN/1.73M*2
ERYTHROCYTE [DISTWIDTH] IN BLOOD BY AUTOMATED COUNT: 12.3 % (ref 11.5–14.5)
GLUCOSE SERPL-MCNC: 126 MG/DL (ref 74–99)
HCT VFR BLD AUTO: 38 % (ref 36–46)
HGB BLD-MCNC: 12.2 G/DL (ref 12–16)
MCH RBC QN AUTO: 30.7 PG (ref 26–34)
MCHC RBC AUTO-ENTMCNC: 32.1 G/DL (ref 32–36)
MCV RBC AUTO: 96 FL (ref 80–100)
NRBC BLD-RTO: 0 /100 WBCS (ref 0–0)
PLATELET # BLD AUTO: 148 X10*3/UL (ref 150–450)
POTASSIUM SERPL-SCNC: 4 MMOL/L (ref 3.5–5.3)
RBC # BLD AUTO: 3.98 X10*6/UL (ref 4–5.2)
SODIUM SERPL-SCNC: 140 MMOL/L (ref 136–145)
WBC # BLD AUTO: 8.5 X10*3/UL (ref 4.4–11.3)

## 2024-12-20 PROCEDURE — 2500000002 HC RX 250 W HCPCS SELF ADMINISTERED DRUGS (ALT 637 FOR MEDICARE OP, ALT 636 FOR OP/ED)

## 2024-12-20 PROCEDURE — 2500000001 HC RX 250 WO HCPCS SELF ADMINISTERED DRUGS (ALT 637 FOR MEDICARE OP)

## 2024-12-20 PROCEDURE — 2500000001 HC RX 250 WO HCPCS SELF ADMINISTERED DRUGS (ALT 637 FOR MEDICARE OP): Performed by: INTERNAL MEDICINE

## 2024-12-20 PROCEDURE — 80048 BASIC METABOLIC PNL TOTAL CA: CPT | Performed by: INTERNAL MEDICINE

## 2024-12-20 PROCEDURE — 36415 COLL VENOUS BLD VENIPUNCTURE: CPT | Performed by: INTERNAL MEDICINE

## 2024-12-20 PROCEDURE — 99232 SBSQ HOSP IP/OBS MODERATE 35: CPT | Performed by: INTERNAL MEDICINE

## 2024-12-20 PROCEDURE — 2500000004 HC RX 250 GENERAL PHARMACY W/ HCPCS (ALT 636 FOR OP/ED): Performed by: INTERNAL MEDICINE

## 2024-12-20 PROCEDURE — 99233 SBSQ HOSP IP/OBS HIGH 50: CPT | Performed by: INTERNAL MEDICINE

## 2024-12-20 PROCEDURE — 1100000001 HC PRIVATE ROOM DAILY

## 2024-12-20 PROCEDURE — 85027 COMPLETE CBC AUTOMATED: CPT | Performed by: INTERNAL MEDICINE

## 2024-12-20 RX ORDER — AMLODIPINE BESYLATE 5 MG/1
5 TABLET ORAL DAILY
Status: DISCONTINUED | OUTPATIENT
Start: 2024-12-20 | End: 2024-12-27 | Stop reason: HOSPADM

## 2024-12-20 RX ORDER — FUROSEMIDE 10 MG/ML
40 INJECTION INTRAMUSCULAR; INTRAVENOUS EVERY 12 HOURS
Status: DISCONTINUED | OUTPATIENT
Start: 2024-12-20 | End: 2024-12-22

## 2024-12-20 RX ORDER — TRAMADOL HYDROCHLORIDE 50 MG/1
25 TABLET ORAL EVERY 6 HOURS PRN
Status: DISCONTINUED | OUTPATIENT
Start: 2024-12-20 | End: 2024-12-27

## 2024-12-20 RX ADMIN — Medication 2000 UNITS: at 08:32

## 2024-12-20 RX ADMIN — POTASSIUM CHLORIDE 10 MEQ: 750 TABLET, EXTENDED RELEASE ORAL at 20:54

## 2024-12-20 RX ADMIN — TORSEMIDE 30 MG: 20 TABLET ORAL at 08:32

## 2024-12-20 RX ADMIN — FUROSEMIDE 40 MG: 10 INJECTION, SOLUTION INTRAMUSCULAR; INTRAVENOUS at 22:55

## 2024-12-20 RX ADMIN — LOSARTAN POTASSIUM 100 MG: 50 TABLET, FILM COATED ORAL at 08:32

## 2024-12-20 RX ADMIN — ACETAMINOPHEN 975 MG: 325 TABLET, FILM COATED ORAL at 22:55

## 2024-12-20 RX ADMIN — ROSUVASTATIN CALCIUM 5 MG: 10 TABLET, FILM COATED ORAL at 20:53

## 2024-12-20 RX ADMIN — ACETAMINOPHEN 975 MG: 325 TABLET, FILM COATED ORAL at 14:43

## 2024-12-20 RX ADMIN — TRAMADOL HYDROCHLORIDE 25 MG: 50 TABLET, COATED ORAL at 14:44

## 2024-12-20 RX ADMIN — AMLODIPINE BESYLATE 5 MG: 5 TABLET ORAL at 11:19

## 2024-12-20 RX ADMIN — SENNOSIDES AND DOCUSATE SODIUM 2 TABLET: 50; 8.6 TABLET ORAL at 20:54

## 2024-12-20 RX ADMIN — FUROSEMIDE 40 MG: 10 INJECTION, SOLUTION INTRAMUSCULAR; INTRAVENOUS at 10:05

## 2024-12-20 RX ADMIN — ACETAMINOPHEN 975 MG: 325 TABLET, FILM COATED ORAL at 07:02

## 2024-12-20 RX ADMIN — ESCITALOPRAM OXALATE 5 MG: 10 TABLET ORAL at 08:32

## 2024-12-20 RX ADMIN — POTASSIUM CHLORIDE 10 MEQ: 750 TABLET, EXTENDED RELEASE ORAL at 08:33

## 2024-12-20 RX ADMIN — TRAMADOL HYDROCHLORIDE 25 MG: 50 TABLET, COATED ORAL at 20:55

## 2024-12-20 ASSESSMENT — PAIN SCALES - GENERAL
PAINLEVEL_OUTOF10: 0 - NO PAIN
PAINLEVEL_OUTOF10: 0 - NO PAIN
PAINLEVEL_OUTOF10: 7
PAINLEVEL_OUTOF10: 0 - NO PAIN

## 2024-12-20 ASSESSMENT — PAIN - FUNCTIONAL ASSESSMENT
PAIN_FUNCTIONAL_ASSESSMENT: 0-10
PAIN_FUNCTIONAL_ASSESSMENT: 0-10

## 2024-12-20 NOTE — H&P
History Of Present Illness  Ermelinda Benoit is a 89-year-old female who presents to the ED after a fall.  Patient has a past medical history of HTN, A-fib on Eliquis, CHF, CVA, COPD, aortic stenosis with TAVR, artery embolism, and right breast cancer with lumpectomy.  Per ED note patient was at her nursing facility [facility] when she lost her balance and fell onto her left side.  Patient reports she was walking with her walker, denies any dizziness at the time, reports that she tripped and was unable to regain her balance.  POA, daughter Alexus at bedside reports that patient has recently had increasing shortness of breath as well, which she has been evaluated for cardiology and everything was noted to be okay.  Daughter believes possible shortness of breath is related to patient's anxiety when she gets worked up.  Patient follows with Dr. Hoffman for cardiology.  Patient has not seen pulmonology for diagnosis of COPD.  Daughter says she was unaware of the COPD diagnosis until recently, but does not require oxygen at home.  Per daughter patient reports sometimes that she is dizzy but she does not have enough fluid intake and symptoms usually subside when daughter can get her to take in enough fluids.  Patient denies any fevers, cough, chest pain, vomiting, abdominal pain, urinary symptoms, or open skin ulcers.     ED Course      Hemodynamically Stable.    Vitals: Temp. 36.2, HR 80, Resp.  22, /83, Pulse ox 90% RA     Labs: Glucose 89, Na+ 140, K+ 4.2, Bun 30, Creat.  0.93 GFR 58, Ca 9.8, Albumin 4.7, Alk Phos. 87, ALT 19, AST 22, INR 1.3, WBC 6.2,  Hgb.  13.5 Hct.  40.6,      Medications: Tylenol      Imaging: interpreted by radiologist.  CT cervical spine: No acute fracture or traumatic malalignment of the cervical spine.  CT head: No acute intracranial abnormality or calvarial fracture.  X-ray hip left: Intertrochanteric left proximal femoral fracture.  Severe left hip joint degenerative changes with  interval progression.  EKG: Not available for my review.          Past Medical History  Past Medical History:   Diagnosis Date    Personal history of malignant neoplasm of breast 01/09/2018    History of malignant neoplasm of breast    Personal history of other medical treatment     History of cardiac monitoring    Personal history of other medical treatment     History of echocardiogram    Unspecified nondisplaced fracture of fifth cervical vertebra, initial encounter for closed fracture 11/15/2016    Closed nondisplaced fracture of fifth cervical vertebra, unspecified fracture morphology, initial encounter       Surgical History  Past Surgical History:   Procedure Laterality Date    BREAST LUMPECTOMY  03/09/2018    Breast Surgery Lumpectomy    CATARACT EXTRACTION  01/23/2018    Cataract Surgery    COLONOSCOPY  03/09/2018    Colonoscopy (Fiberoptic)    OTHER SURGICAL HISTORY  01/09/2018    Therapeutic Cystoscopy    OTHER SURGICAL HISTORY  03/30/2021    Transcatheter aortic valve replacement    TOTAL HIP ARTHROPLASTY  03/09/2018    Hip Replacement        Social History  Patient reports that she has never smoked, or used any drugs including marijuana.  Patient does report a 1-2 times year use of alcohol on holidays.  Patient lives at [facility], ambulates with a walker.    Family History  Family History   Problem Relation Name Age of Onset    Other (cardiac disorder) Mother      Diabetes Father          Allergies  Codeine, Iodinated contrast media, Macrolide antibiotics, Moxifloxacin, Nitrofurantoin, Penicillins, Adhesive, Propoxyphene, Tetracycline, Erythromycin, and Sulfa (sulfonamide antibiotics)    Review of Systems     10 point ROS systems completed and is negative except for what is stated in HPI.     Physical Exam  Constitutional:       General: She is awake. She is not in acute distress.     Appearance: Normal appearance. She is underweight. She is not toxic-appearing.      Interventions: Nasal cannula  "in place.   HENT:      Head: Normocephalic and atraumatic.      Nose: Nose normal. No rhinorrhea.      Mouth/Throat:      Mouth: Mucous membranes are moist.   Eyes:      General:         Right eye: No discharge.         Left eye: No discharge.      Conjunctiva/sclera: Conjunctivae normal.      Pupils: Pupils are equal, round, and reactive to light.   Cardiovascular:      Rate and Rhythm: Normal rate and regular rhythm.      Pulses: Normal pulses.           Dorsalis pedis pulses are 2+ on the right side and 2+ on the left side.   Pulmonary:      Effort: Pulmonary effort is normal. Tachypnea present. No respiratory distress.      Breath sounds: Examination of the right-lower field reveals decreased breath sounds. Examination of the left-lower field reveals decreased breath sounds. Decreased breath sounds present. No wheezing.   Abdominal:      General: Bowel sounds are normal. There is no distension.      Palpations: Abdomen is soft.      Tenderness: There is no abdominal tenderness. There is no guarding.   Musculoskeletal:         General: Normal range of motion.      Cervical back: Normal range of motion and neck supple.      Right lower le+ Edema present.      Left lower leg: Edema present.      Comments: Left leg shortened and externally rotated.  Pain in left hip with palpation.  Pain with movement.  Able to wiggle toes bilateral.  Pulses palpable, dorsalis pedis bilateral feet.   Skin:     General: Skin is warm and dry.   Neurological:      General: No focal deficit present.      Mental Status: She is alert and oriented to person, place, and time. Mental status is at baseline.      Motor: No weakness.   Psychiatric:         Mood and Affect: Mood normal.         Behavior: Behavior normal.          Last Recorded Vitals  Blood pressure 168/77, pulse 72, temperature 36.4 °C (97.5 °F), temperature source Temporal, resp. rate 16, height 1.575 m (5' 2\"), weight 54.4 kg (120 lb), SpO2 98%.    Relevant " Results  Results for orders placed or performed during the hospital encounter of 12/18/24 (from the past 24 hours)   CBC   Result Value Ref Range    WBC 8.6 4.4 - 11.3 x10*3/uL    nRBC 0.0 0.0 - 0.0 /100 WBCs    RBC 3.92 (L) 4.00 - 5.20 x10*6/uL    Hemoglobin 12.2 12.0 - 16.0 g/dL    Hematocrit 36.0 36.0 - 46.0 %    MCV 92 80 - 100 fL    MCH 31.1 26.0 - 34.0 pg    MCHC 33.9 32.0 - 36.0 g/dL    RDW 12.2 11.5 - 14.5 %    Platelets 169 150 - 450 x10*3/uL   Basic metabolic panel   Result Value Ref Range    Glucose 114 (H) 74 - 99 mg/dL    Sodium 138 136 - 145 mmol/L    Potassium 4.2 3.5 - 5.3 mmol/L    Chloride 104 98 - 107 mmol/L    Bicarbonate 24 21 - 32 mmol/L    Anion Gap 14 10 - 20 mmol/L    Urea Nitrogen 32 (H) 6 - 23 mg/dL    Creatinine 1.04 0.50 - 1.05 mg/dL    eGFR 51 (L) >60 mL/min/1.73m*2    Calcium 9.4 8.6 - 10.3 mg/dL   POCT GLUCOSE   Result Value Ref Range    POCT Glucose 118 (H) 74 - 99 mg/dL     XR chest 1 view    Result Date: 12/18/2024  Interpreted By:  Tim Sue, STUDY: XR CHEST 1 VIEW;  12/18/2024 9:53 pm   INDICATION: Signs/Symptoms:Surgery clearance, SOB.     COMPARISON: X-ray chest 05/15/2024. CT chest 05/17/2024.   ACCESSION NUMBER(S): [id number]   ORDERING CLINICIAN: FIORELLA CORDOBA   FINDINGS: AP radiograph of the chest was provided.   The heart is enlarged with mild venous congestion. Aortic stent graft is in place. Calcification of the trachea and bilateral mainstem bronchi. Slightly increased interstitial lung markings are evident. Retrocardiac opacity is roughly similar to prior exam. No pneumothorax. No acute osseous abnormality.       1.  Slightly increased interstitial lung markings may represent mild edema, viral bronchiolitis, or chronic airways disease. 2. Stable retrocardiac opacity, likely representing chronic atelectasis. 3. Cardiomegaly with mild venous congestion.       MACRO: None   Signed by: Tim Sue 12/18/2024 11:04 PM Dictation workstation:   [id number]    CT  head wo IV contrast    Result Date: 12/18/2024  Interpreted By:  Carloz Mcconnell, STUDY: CT HEAD WO IV CONTRAST; CT CERVICAL SPINE WO IV CONTRAST;  12/18/2024 6:43 pm   INDICATION: Signs/Symptoms:HIA, distracting injuries, no focal neurologic deficits or physical exam findings on head/c spine   COMPARISON: 07/07/2023   ACCESSION NUMBER(S): [id number]; [id number]   ORDERING CLINICIAN: BISI CHI   TECHNIQUE: Axial noncontrast CT images of head with coronal and sagittal reconstructed images. Axial noncontrast CT images of the cervical spine with coronal and sagittal reconstructed images.   FINDINGS: Demineralization limits sensitivity   CT HEAD:   Left frontal and left occipital encephalomalacia again seen. Periventricular low attenuation compatible with moderate chronic small vessel ischemic change. No acute hemorrhage. No mass effect Global volume loss. No acute hemorrhage. Vascular calcification. Paranasal sinuses and mastoids otherwise clear.   CT CERVICAL SPINE:   Scattered multilevel degenerative disc disease is seen. Demineralization. No findings of acute cervical spine fracture. Vascular calcification.       CT HEAD: No acute intracranial abnormality or calvarial fracture. Chronic findings.   CT CERVICAL SPINE: No acute fracture or traumatic malalignment of the cervical spine. Multilevel degenerative disc disease. Demineralization limits sensitivity. Vascular calcification   Signed by: Carloz Mcconnell 12/18/2024 7:04 PM Dictation workstation:   [id number]    CT cervical spine wo IV contrast    Result Date: 12/18/2024  Interpreted By:  Carloz Mcconnell, STUDY: CT HEAD WO IV CONTRAST; CT CERVICAL SPINE WO IV CONTRAST;  12/18/2024 6:43 pm   INDICATION: Signs/Symptoms:HIA, distracting injuries, no focal neurologic deficits or physical exam findings on head/c spine   COMPARISON: 07/07/2023   ACCESSION NUMBER(S): [id number]; [id number]   ORDERING CLINICIAN: BISI CHI   TECHNIQUE: Axial noncontrast  CT images of head with coronal and sagittal reconstructed images. Axial noncontrast CT images of the cervical spine with coronal and sagittal reconstructed images.   FINDINGS: Demineralization limits sensitivity   CT HEAD:   Left frontal and left occipital encephalomalacia again seen. Periventricular low attenuation compatible with moderate chronic small vessel ischemic change. No acute hemorrhage. No mass effect Global volume loss. No acute hemorrhage. Vascular calcification. Paranasal sinuses and mastoids otherwise clear.   CT CERVICAL SPINE:   Scattered multilevel degenerative disc disease is seen. Demineralization. No findings of acute cervical spine fracture. Vascular calcification.       CT HEAD: No acute intracranial abnormality or calvarial fracture. Chronic findings.   CT CERVICAL SPINE: No acute fracture or traumatic malalignment of the cervical spine. Multilevel degenerative disc disease. Demineralization limits sensitivity. Vascular calcification   Signed by: Carloz Mcconnell 12/18/2024 7:04 PM Dictation workstation:   [id number]    XR hip left with pelvis when performed 2 or 3 views    Result Date: 12/18/2024  Interpreted By:  Jeanne Bourne, STUDY: Single view pelvis. Left hip, two views.   INDICATION: Signs/Symptoms:left hip pain after fall.   COMPARISON: Radiographs 02/17/2009.   ACCESSION NUMBER(S): [id number]   ORDERING CLINICIAN: BISI CHI   FINDINGS: There is intertrochanteric left proximal femoral fracture, paroxysmal appreciated on the frogleg lateral view. Bones appear demineralized. Severe left hip joint degenerative changes with interval progression. Right hip arthroplasty hardware is intact in the AP view. Bones appear demineralized. Soft tissues are within normal limits.       1. Intertrochanteric left proximal femoral fracture. 2. Severe left hip joint degenerative changes with interval progression.   MACRO: None.   Signed by: Jeanne Bourne 12/18/2024 6:50 PM Dictation  workstation:   [id number]        Assessment/Plan   Assessment & Plan  Fall, initial encounter    Closed nondisplaced intertrochanteric fracture of left femur      Patient admitted today after mechanical fall while walking with her walker.  Patient reports she tripped and was unable to regain her balance.  Patient alert and oriented, denies LOC, or hitting her head.  CT scans negative in ED for acute abnormality.  X-ray of left hip showed intertrochanteric left proximal femoral fracture.  Orthopedic consult in place.  Due to patient's cardiac history cardiology consult placed for surgery clearance.  One-time dose of Toradol ordered for pain as patient's daughter states patient cannot tolerate narcotics and becomes difficult to manage when given.  Daughter is Alexus blankenship, she is POA and would like to be consulted/called when patient is getting ready for surgery.  Her phone number is [phone].     Patient admitted to Dr. Mckay Arita for further medical management.      # Intertrochanteric left proximal femoral fracture  # Mechanical fall  # Dehydration  # Increased back/leg pain  -Consult to orthopedic surgery  -Cardiology consult placed for surgery clearance  -Type and screen/coag labs completed  -Chest x-ray ordered  -As needed antiemetics  -As needed analgesics  -EKG ordered, and as needed  -As needed oxygen >92%  -PT/OT/SW   -Fall precautions  -NPO diet, effective midnight for procedure.  -LR at 50 mL/HR  -admitted to inpatient   -q4 vitals   -morning labs, CBC, BMP     Chronic Conditions   # CVA  # COPD  # CHF  # A-fib on Eliquis  # Breast CA with lumpectomy  # Hypertension     Continue home medications as ordered   Full Code      #DVT Prophylaxis   Scd's as tolerated   DVT Prophylaxis on Eliquis, hold for procedure.      I spent 45 minutes in the professional and overall care of this patient.      Mckay Arita, DO

## 2024-12-20 NOTE — PROGRESS NOTES
12/20/24 1140   Discharge Planning   Living Arrangements Other (Comment)  (AL)   Support Systems Children   Assistance Needed independent   Type of Residence Assisted living   Do you have animals or pets at home? No   Care Facility Name [facility]   Who is requesting discharge planning? Provider   Home or Post Acute Services Post acute facilities (Rehab/SNF/etc)   Expected Discharge Disposition SNF   Does the patient need discharge transport arranged? Yes   RoundTrip coordination needed? Yes   Has discharge transport been arranged? No   Patient Choice   Provider Choice list and CMS website (https://medicare.gov/care-compare#search) for post-acute Quality and Resource Measure Data were provided and reviewed with: Patient   Intensity of Service   Intensity of Service 0-30 min     Met with patient at bedside. Introduced self and role as care coordinator. Demographics verified. Patient PCP is SHANKAR Arita. Patient insurance is medicare and [facility]. Patient is a resident at [facility] in [place]. Patient uses a walker and a w/c. Patient will require cardiology clearance  for hip surgery. Patient requesting SNF list. SNF list printed from [facility] and provided for patient review. Care coordinators will follow for discharge planning.           12/20 1210  Spoke with daughter Alexus on the phone. She reports she spoke with [facility] and they are willing to take her back after discharge to do PT with [facility] as long as patient is willing to do the intensive therapy.

## 2024-12-20 NOTE — PROGRESS NOTES
"Ermelinda Benoit is a 89 y.o. female on day 2 of admission presenting with Fall, initial encounter.    Subjective   The patient reports of significant left hip pain       Objective     Physical Exam  The left lower extremity is shortened and externally rotated.  There is no tenderness to palpation distal to the hip.  She is able to plantarflex and dorsiflex the ankle.  Sensation to light touch is intact.  Last Recorded Vitals  Blood pressure 170/80, pulse 83, temperature 36.9 °C (98.4 °F), temperature source Temporal, resp. rate 16, height 1.575 m (5' 2\"), weight 54.4 kg (120 lb), SpO2 92%.  Intake/Output last 3 Shifts:  I/O last 3 completed shifts:  In: 670 (12.3 mL/kg) [I.V.:670 (12.3 mL/kg)]  Out: 1100 (20.2 mL/kg) [Urine:1100 (0.6 mL/kg/hr)]  Weight: 54.4 kg     Relevant Results                     Results for orders placed or performed during the hospital encounter of 12/18/24 (from the past 24 hours)   POCT GLUCOSE   Result Value Ref Range    POCT Glucose 118 (H) 74 - 99 mg/dL   CBC   Result Value Ref Range    WBC 8.5 4.4 - 11.3 x10*3/uL    nRBC 0.0 0.0 - 0.0 /100 WBCs    RBC 3.98 (L) 4.00 - 5.20 x10*6/uL    Hemoglobin 12.2 12.0 - 16.0 g/dL    Hematocrit 38.0 36.0 - 46.0 %    MCV 96 80 - 100 fL    MCH 30.7 26.0 - 34.0 pg    MCHC 32.1 32.0 - 36.0 g/dL    RDW 12.3 11.5 - 14.5 %    Platelets 148 (L) 150 - 450 x10*3/uL   Basic Metabolic Panel   Result Value Ref Range    Glucose 126 (H) 74 - 99 mg/dL    Sodium 140 136 - 145 mmol/L    Potassium 4.0 3.5 - 5.3 mmol/L    Chloride 105 98 - 107 mmol/L    Bicarbonate 23 21 - 32 mmol/L    Anion Gap 16 10 - 20 mmol/L    Urea Nitrogen 26 (H) 6 - 23 mg/dL    Creatinine 0.90 0.50 - 1.05 mg/dL    eGFR 60 (L) >60 mL/min/1.73m*2    Calcium 9.2 8.6 - 10.3 mg/dL             Assessment/Plan   Assessment & Plan  Fall, initial encounter    Closed nondisplaced intertrochanteric fracture of left femur    The anesthesia team has required formal written clearance by cardiology prior to " surgery.  Plan for possible surgery later today.  Please keep the patient n.p.o. and on bedrest             Myles Leggett MD

## 2024-12-20 NOTE — PROGRESS NOTES
Subjective Data:  Patient continues to report shortness of breath  She does have leg pain  No chest pain or palpitations  Generally weak    Overnight Events:    Elevated blood pressure     Objective Data:  Last Recorded Vitals:  Vitals:    12/19/24 1609 12/19/24 1948 12/20/24 0520 12/20/24 0816   BP: (!) 185/90 168/77 170/80 161/77   BP Location:  Left arm Left arm    Patient Position:  Lying Sitting    Pulse: 64 72 83 63   Resp:  16 16    Temp: 36.9 °C (98.4 °F) 36.4 °C (97.5 °F) 36.9 °C (98.4 °F) 36.6 °C (97.9 °F)   TempSrc:  Temporal Temporal    SpO2: 91% 98% 92% 98%   Weight:       Height:           Last Labs:  CBC - 12/20/2024:  6:09 AM  8.5 12.2 148    38.0      CMP - 12/20/2024:  6:09 AM  9.2 8.4 22 --- 1.5   _ 4.7 19 87      PTT - No results in last year.  1.3   14.2 _     TROPHS   Date/Time Value Ref Range Status   04/26/2023 04:14 PM 56 0 - 13 ng/L Final     Comment:     .  Less than 99th percentile of normal range cutoff-  Female and children under 18 years old <14 ng/L; Male <21 ng/L: Negative  Repeat testing should be performed if clinically indicated.   .  Female and children under 18 years old 14-50 ng/L; Male 21-50 ng/L:  Consistent with possible cardiac damage and possible increased clinical   risk. Serial measurements may help to assess extent of myocardial damage.   .  >50 ng/L: Consistent with cardiac damage, increased clinical risk and  myocardial infarction. Serial measurements may help assess extent of   myocardial damage.   .   NOTE: Children less than 1 year old may have higher baseline troponin   levels and results should be interpreted in conjunction with the overall   clinical context.   .  NOTE: Troponin I testing is performed using a different   testing methodology at [facility] than at other   [facility] hospitals. Direct result comparisons should only   be made within the same method.  RESULTS RB TO BAR RICH, 04/26/2023 17:20     04/26/2023 09:57 AM 36 0 - 13 ng/L  Final     Comment:     .  Less than 99th percentile of normal range cutoff-  Female and children under 18 years old <14 ng/L; Male <21 ng/L: Negative  Repeat testing should be performed if clinically indicated.   .  Female and children under 18 years old 14-50 ng/L; Male 21-50 ng/L:  Consistent with possible cardiac damage and possible increased clinical   risk. Serial measurements may help to assess extent of myocardial damage.   .  >50 ng/L: Consistent with cardiac damage, increased clinical risk and  myocardial infarction. Serial measurements may help assess extent of   myocardial damage.   .   NOTE: Children less than 1 year old may have higher baseline troponin   levels and results should be interpreted in conjunction with the overall   clinical context.   .  NOTE: Troponin I testing is performed using a different   testing methodology at [facility] than at other   [facility]. Direct result comparisons should only   be made within the same method.     04/22/2023 02:40 PM 23 0 - 13 ng/L Final     Comment:     .  Less than 99th percentile of normal range cutoff-  Female and children under 18 years old <14 ng/L; Male <21 ng/L: Negative  Repeat testing should be performed if clinically indicated.   .  Female and children under 18 years old 14-50 ng/L; Male 21-50 ng/L:  Consistent with possible cardiac damage and possible increased clinical   risk. Serial measurements may help to assess extent of myocardial damage.   .  >50 ng/L: Consistent with cardiac damage, increased clinical risk and  myocardial infarction. Serial measurements may help assess extent of   myocardial damage.   .   NOTE: Children less than 1 year old may have higher baseline troponin   levels and results should be interpreted in conjunction with the overall   clinical context.   .  NOTE: Troponin I testing is performed using a different   testing methodology at [facility] than at other   [facility]. Direct  result comparisons should only   be made within the same method.       BNP   Date/Time Value Ref Range Status   12/18/2024 07:15  0 - 99 pg/mL Final   04/26/2023 09:57  0 - 99 pg/mL Final     Comment:     .  <100 pg/mL - Heart failure unlikely  100-299 pg/mL - Intermediate probability of acute heart  .               failure exacerbation. Correlate with clinical  .               context and patient history.    >=300 pg/mL - Heart Failure likely. Correlate with clinical  .               context and patient history.  BNP testing is performed using different testing   methodology at [facility] than at other   system hospitals. Direct result comparisons should   only be made within the same method.     04/11/2019 09:56  0 - 99 pg/mL Final     Comment:     .  <100 pg/mL - Heart failure unlikely  100-299 pg/mL - Intermediate probability of acute heart  .               failure exacerbation. Correlate with clinical  .               context and patient history.    >=300 pg/mL - Heart Failure likely. Correlate with clinical  .               context and patient history.  BNP testing is performed using different testing   methodology at [facility] than at other   [facility] hospitals. Direct result comparisons should   only be made within the same method.        Last I/O:  I/O last 3 completed shifts:  In: 670 (12.3 mL/kg) [I.V.:670 (12.3 mL/kg)]  Out: 1100 (20.2 mL/kg) [Urine:1100 (0.6 mL/kg/hr)]  Weight: 54.4 kg     Past Cardiology Tests (Last 3 Years):  EKG:  No telemetry available  Echo:  Transthoracic Echo (TTE) Complete 05/21/2024    Ejection Fractions:  55 to 60%  Cath:  No results found for this or any previous visit from the past 1095 days.    Stress Test:  No results found for this or any previous visit from the past 1095 days.    Cardiac Imaging:  Chest x-ray with mild pulmonary vascular congestion      Inpatient Medications:  Scheduled medications   Medication Dose Route  Frequency    acetaminophen  975 mg oral q8h    amLODIPine  5 mg oral Daily    [Held by provider] apixaban  2.5 mg oral BID    cholecalciferol  2,000 Units oral Daily    escitalopram  5 mg oral Daily    furosemide  40 mg intravenous q12h    losartan  100 mg oral Daily    potassium chloride CR  10 mEq oral BID    rosuvastatin  5 mg oral Nightly    sennosides-docusate sodium  2 tablet oral Nightly    [Held by provider] torsemide  30 mg oral Daily     PRN medications   Medication    bisacodyl    guaiFENesin    ipratropium-albuteroL    magnesium hydroxide    melatonin    ondansetron    polyethylene glycol     Continuous Medications   Medication Dose Last Rate       Physical Exam:  Constitutional/General: Frail, elderly female who is alert and oriented and answers questions appropriately.  She is mildly increased work of respiration.   Neck: Moderately increased JVP,no carotid bruits.  Respiratory:  Lungs with diminished breath sounds.  Mild respiratory distress  Cardiovascular: Heart is irregular irregular rhythm with rate controlled with a 1/6 systolic ejection murmur at the right upper sternal border.  No gallops, or rubs, PMI nondisplaced, 2+ distal pulses  Chest:  No chest wall tenderness  GI:  Abdomen soft, nontender, nondistended, + BS, no organomegaly, no palpable masses, no rebound, guarding, or rigidity  Musculoskeletal: Decreased mobility of left lower extremity  Extremities: Mild pretibial pitting peripheral edema  Integument: Skin warm and dry, no rashes  Neurologic:  No focal deficits  Psychiatric:  Normal affect     Assessment/Plan   Ermelinda Benoit is a 89 y.o. female with history of permanent atrial fibrillation on Eliquis, status post 2018 TAVR for aortic stenosis (Evolute 26), heart failure with preserved ejection action, hypertension, stroke, hyperlipidemia presenting with hip fracture and now needs open reduction internal fixation and cardiology was consulted for preop eval.  1.  Heart failure  preserved ejection fraction-on admission her exam and chest x-ray and BNP were all consistent with mild volume overload.  In addition she admitted to some shortness of breath.  We stopped her IV fluids and gave her a dose of IV Lasix but she still appears to have signs and symptoms of CHF this morning-will diurese more aggressively and reassess tomorrow.  Also need to optimize her blood pressure  2.  Preop evaluation-patient in need of open reduction total fixation of left hip.  Per the revised cardiac risk index she would be at moderate risk for cardiac morbidity and mortality.  She continues to have signs and symptoms of CHF therefore continue diuresis.  Reassess tomorrow.  3.  Status post TAVR-stable per exam and 5/2024 echo  4.  Hypertension-blood pressure suboptimally controlled, will add amlodipine  Peripheral IV 12/18/24 20 G 3 cm Left;Proximal Forearm (Active)   Site Assessment Clean;Dry 12/20/24 0900   Dressing Status Clean;Dry 12/20/24 0900   Number of days: 2       Code Status:  Full Code          Jean Guardado DO

## 2024-12-20 NOTE — CARE PLAN
The patient's goals for the shift include sleep    The clinical goals for the shift include safety and comfort      Problem: Pain  Goal: Takes deep breaths with improved pain control throughout the shift  Outcome: Progressing  Goal: Turns in bed with improved pain control throughout the shift  Outcome: Progressing  Goal: Walks with improved pain control throughout the shift  Outcome: Progressing  Goal: Performs ADL's with improved pain control throughout shift  Outcome: Progressing  Goal: Participates in PT with improved pain control throughout the shift  Outcome: Progressing  Goal: Free from opioid side effects throughout the shift  Outcome: Progressing  Goal: Free from acute confusion related to pain meds throughout the shift  Outcome: Progressing     Problem: Skin  Goal: Participates in plan/prevention/treatment measures  Outcome: Progressing  Flowsheets (Taken 12/20/2024 0257)  Participates in plan/prevention/treatment measures: Elevate heels  Goal: Prevent/manage excess moisture  Outcome: Progressing  Flowsheets (Taken 12/20/2024 0257)  Prevent/manage excess moisture: Moisturize dry skin  Goal: Prevent/minimize sheer/friction injuries  Outcome: Progressing  Flowsheets (Taken 12/20/2024 0257)  Prevent/minimize sheer/friction injuries: Use pull sheet  Goal: Promote/optimize nutrition  Outcome: Progressing  Flowsheets (Taken 12/20/2024 0257)  Promote/optimize nutrition: Monitor/record intake including meals  Goal: Promote skin healing  Outcome: Progressing  Flowsheets (Taken 12/20/2024 0257)  Promote skin healing:   Protective dressings over bony prominences   Rotate device position/do not position patient on device   Assess skin/pad under line(s)/device(s)

## 2024-12-21 ENCOUNTER — APPOINTMENT (OUTPATIENT)
Dept: RADIOLOGY | Facility: HOSPITAL | Age: 89
End: 2024-12-21
Payer: MEDICARE

## 2024-12-21 ENCOUNTER — ANESTHESIA (OUTPATIENT)
Dept: OPERATING ROOM | Facility: HOSPITAL | Age: 89
End: 2024-12-21
Payer: MEDICARE

## 2024-12-21 ENCOUNTER — ANESTHESIA EVENT (OUTPATIENT)
Dept: OPERATING ROOM | Facility: HOSPITAL | Age: 89
End: 2024-12-21
Payer: MEDICARE

## 2024-12-21 ENCOUNTER — PREP FOR PROCEDURE (OUTPATIENT)
Dept: POSTOP/PACU | Facility: HOSPITAL | Age: 89
End: 2024-12-21

## 2024-12-21 LAB
ANION GAP SERPL CALC-SCNC: 14 MMOL/L (ref 10–20)
BUN SERPL-MCNC: 29 MG/DL (ref 6–23)
CALCIUM SERPL-MCNC: 9.2 MG/DL (ref 8.6–10.3)
CHLORIDE SERPL-SCNC: 103 MMOL/L (ref 98–107)
CO2 SERPL-SCNC: 28 MMOL/L (ref 21–32)
CREAT SERPL-MCNC: 0.99 MG/DL (ref 0.5–1.05)
EGFRCR SERPLBLD CKD-EPI 2021: 54 ML/MIN/1.73M*2
ERYTHROCYTE [DISTWIDTH] IN BLOOD BY AUTOMATED COUNT: 12.3 % (ref 11.5–14.5)
GLUCOSE SERPL-MCNC: 80 MG/DL (ref 74–99)
HCT VFR BLD AUTO: 36.6 % (ref 36–46)
HGB BLD-MCNC: 11.9 G/DL (ref 12–16)
MCH RBC QN AUTO: 30.9 PG (ref 26–34)
MCHC RBC AUTO-ENTMCNC: 32.5 G/DL (ref 32–36)
MCV RBC AUTO: 95 FL (ref 80–100)
NRBC BLD-RTO: 0 /100 WBCS (ref 0–0)
PLATELET # BLD AUTO: 151 X10*3/UL (ref 150–450)
POTASSIUM SERPL-SCNC: 3.7 MMOL/L (ref 3.5–5.3)
RBC # BLD AUTO: 3.85 X10*6/UL (ref 4–5.2)
SODIUM SERPL-SCNC: 141 MMOL/L (ref 136–145)
WBC # BLD AUTO: 7.4 X10*3/UL (ref 4.4–11.3)

## 2024-12-21 PROCEDURE — 2720000007 HC OR 272 NO HCPCS: Performed by: ORTHOPAEDIC SURGERY

## 2024-12-21 PROCEDURE — 2500000004 HC RX 250 GENERAL PHARMACY W/ HCPCS (ALT 636 FOR OP/ED): Performed by: NURSE ANESTHETIST, CERTIFIED REGISTERED

## 2024-12-21 PROCEDURE — 99233 SBSQ HOSP IP/OBS HIGH 50: CPT | Performed by: INTERNAL MEDICINE

## 2024-12-21 PROCEDURE — 2500000004 HC RX 250 GENERAL PHARMACY W/ HCPCS (ALT 636 FOR OP/ED)

## 2024-12-21 PROCEDURE — 2500000001 HC RX 250 WO HCPCS SELF ADMINISTERED DRUGS (ALT 637 FOR MEDICARE OP): Performed by: REGISTERED NURSE

## 2024-12-21 PROCEDURE — 2500000004 HC RX 250 GENERAL PHARMACY W/ HCPCS (ALT 636 FOR OP/ED): Performed by: REGISTERED NURSE

## 2024-12-21 PROCEDURE — 2780000003 HC OR 278 NO HCPCS: Performed by: ORTHOPAEDIC SURGERY

## 2024-12-21 PROCEDURE — 0QS704Z REPOSITION LEFT UPPER FEMUR WITH INTERNAL FIXATION DEVICE, OPEN APPROACH: ICD-10-PCS | Performed by: ORTHOPAEDIC SURGERY

## 2024-12-21 PROCEDURE — 76000 FLUOROSCOPY <1 HR PHYS/QHP: CPT

## 2024-12-21 PROCEDURE — A6213 FOAM DRG >16<=48 SQ IN W/BDR: HCPCS | Performed by: ORTHOPAEDIC SURGERY

## 2024-12-21 PROCEDURE — 3700000001 HC GENERAL ANESTHESIA TIME - INITIAL BASE CHARGE: Performed by: ORTHOPAEDIC SURGERY

## 2024-12-21 PROCEDURE — 80048 BASIC METABOLIC PNL TOTAL CA: CPT | Performed by: INTERNAL MEDICINE

## 2024-12-21 PROCEDURE — 2500000004 HC RX 250 GENERAL PHARMACY W/ HCPCS (ALT 636 FOR OP/ED): Mod: JZ | Performed by: ORTHOPAEDIC SURGERY

## 2024-12-21 PROCEDURE — 7100000001 HC RECOVERY ROOM TIME - INITIAL BASE CHARGE: Performed by: ORTHOPAEDIC SURGERY

## 2024-12-21 PROCEDURE — 1100000001 HC PRIVATE ROOM DAILY

## 2024-12-21 PROCEDURE — 2500000001 HC RX 250 WO HCPCS SELF ADMINISTERED DRUGS (ALT 637 FOR MEDICARE OP)

## 2024-12-21 PROCEDURE — 85027 COMPLETE CBC AUTOMATED: CPT | Performed by: INTERNAL MEDICINE

## 2024-12-21 PROCEDURE — C1769 GUIDE WIRE: HCPCS | Performed by: ORTHOPAEDIC SURGERY

## 2024-12-21 PROCEDURE — C1713 ANCHOR/SCREW BN/BN,TIS/BN: HCPCS | Performed by: ORTHOPAEDIC SURGERY

## 2024-12-21 PROCEDURE — 2500000001 HC RX 250 WO HCPCS SELF ADMINISTERED DRUGS (ALT 637 FOR MEDICARE OP): Performed by: INTERNAL MEDICINE

## 2024-12-21 PROCEDURE — 2500000004 HC RX 250 GENERAL PHARMACY W/ HCPCS (ALT 636 FOR OP/ED): Performed by: ANESTHESIOLOGY

## 2024-12-21 PROCEDURE — 3600000004 HC OR TIME - INITIAL BASE CHARGE - PROCEDURE LEVEL FOUR: Performed by: ORTHOPAEDIC SURGERY

## 2024-12-21 PROCEDURE — 2500000004 HC RX 250 GENERAL PHARMACY W/ HCPCS (ALT 636 FOR OP/ED): Performed by: INTERNAL MEDICINE

## 2024-12-21 PROCEDURE — 7100000002 HC RECOVERY ROOM TIME - EACH INCREMENTAL 1 MINUTE: Performed by: ORTHOPAEDIC SURGERY

## 2024-12-21 PROCEDURE — 2500000002 HC RX 250 W HCPCS SELF ADMINISTERED DRUGS (ALT 637 FOR MEDICARE OP, ALT 636 FOR OP/ED)

## 2024-12-21 PROCEDURE — 2500000002 HC RX 250 W HCPCS SELF ADMINISTERED DRUGS (ALT 637 FOR MEDICARE OP, ALT 636 FOR OP/ED): Performed by: REGISTERED NURSE

## 2024-12-21 PROCEDURE — 36415 COLL VENOUS BLD VENIPUNCTURE: CPT | Performed by: INTERNAL MEDICINE

## 2024-12-21 PROCEDURE — 3600000009 HC OR TIME - EACH INCREMENTAL 1 MINUTE - PROCEDURE LEVEL FOUR: Performed by: ORTHOPAEDIC SURGERY

## 2024-12-21 PROCEDURE — 3700000002 HC GENERAL ANESTHESIA TIME - EACH INCREMENTAL 1 MINUTE: Performed by: ORTHOPAEDIC SURGERY

## 2024-12-21 DEVICE — SCREW, LOCKING 5.0MM X 34MM TI STERILE: Type: IMPLANTABLE DEVICE | Site: HIP | Status: FUNCTIONAL

## 2024-12-21 DEVICE — NAIL, TFN ADV SHORT, 170MML, DIAMETER 10, 125 DEG: Type: IMPLANTABLE DEVICE | Site: HIP | Status: FUNCTIONAL

## 2024-12-21 DEVICE — HELICAL BLADES, TFNA FENESTRATED, 85MM, STERILE: Type: IMPLANTABLE DEVICE | Site: HIP | Status: FUNCTIONAL

## 2024-12-21 RX ORDER — ALBUTEROL SULFATE 0.83 MG/ML
2.5 SOLUTION RESPIRATORY (INHALATION) ONCE AS NEEDED
Status: DISCONTINUED | OUTPATIENT
Start: 2024-12-21 | End: 2024-12-21 | Stop reason: HOSPADM

## 2024-12-21 RX ORDER — CEFAZOLIN 1 G/1
INJECTION, POWDER, FOR SOLUTION INTRAVENOUS AS NEEDED
Status: DISCONTINUED | OUTPATIENT
Start: 2024-12-21 | End: 2024-12-21

## 2024-12-21 RX ORDER — ONDANSETRON HYDROCHLORIDE 2 MG/ML
4 INJECTION, SOLUTION INTRAVENOUS ONCE AS NEEDED
Status: DISCONTINUED | OUTPATIENT
Start: 2024-12-21 | End: 2024-12-21 | Stop reason: HOSPADM

## 2024-12-21 RX ORDER — MEPERIDINE HYDROCHLORIDE 25 MG/ML
12.5 INJECTION INTRAMUSCULAR; INTRAVENOUS; SUBCUTANEOUS EVERY 10 MIN PRN
Status: DISCONTINUED | OUTPATIENT
Start: 2024-12-21 | End: 2024-12-21 | Stop reason: HOSPADM

## 2024-12-21 RX ORDER — PROCHLORPERAZINE EDISYLATE 5 MG/ML
5 INJECTION INTRAMUSCULAR; INTRAVENOUS ONCE AS NEEDED
Status: DISCONTINUED | OUTPATIENT
Start: 2024-12-21 | End: 2024-12-21 | Stop reason: HOSPADM

## 2024-12-21 RX ORDER — FENTANYL CITRATE 50 UG/ML
INJECTION, SOLUTION INTRAMUSCULAR; INTRAVENOUS AS NEEDED
Status: DISCONTINUED | OUTPATIENT
Start: 2024-12-21 | End: 2024-12-21

## 2024-12-21 RX ORDER — OXYCODONE HYDROCHLORIDE 5 MG/1
5 TABLET ORAL EVERY 6 HOURS PRN
Status: DISCONTINUED | OUTPATIENT
Start: 2024-12-21 | End: 2024-12-27 | Stop reason: HOSPADM

## 2024-12-21 RX ORDER — KETOROLAC TROMETHAMINE 30 MG/ML
15 INJECTION, SOLUTION INTRAMUSCULAR; INTRAVENOUS ONCE
Status: COMPLETED | OUTPATIENT
Start: 2024-12-21 | End: 2024-12-21

## 2024-12-21 RX ORDER — CEFAZOLIN SODIUM 1 G/50ML
SOLUTION INTRAVENOUS
Status: DISPENSED
Start: 2024-12-21 | End: 2024-12-22

## 2024-12-21 RX ORDER — LIDOCAINE HYDROCHLORIDE 10 MG/ML
0.1 INJECTION, SOLUTION INFILTRATION; PERINEURAL ONCE
Status: DISCONTINUED | OUTPATIENT
Start: 2024-12-21 | End: 2024-12-21 | Stop reason: HOSPADM

## 2024-12-21 RX ORDER — TRANEXAMIC ACID 10 MG/ML
INJECTION, SOLUTION INTRAVENOUS AS NEEDED
Status: DISCONTINUED | OUTPATIENT
Start: 2024-12-21 | End: 2024-12-21

## 2024-12-21 RX ORDER — POLYETHYLENE GLYCOL 3350 17 G/17G
17 POWDER, FOR SOLUTION ORAL DAILY
Status: DISCONTINUED | OUTPATIENT
Start: 2024-12-21 | End: 2024-12-27 | Stop reason: HOSPADM

## 2024-12-21 RX ORDER — TRANEXAMIC ACID 10 MG/ML
1000 INJECTION, SOLUTION INTRAVENOUS ONCE
Status: DISCONTINUED | OUTPATIENT
Start: 2024-12-21 | End: 2024-12-21 | Stop reason: HOSPADM

## 2024-12-21 RX ORDER — ONDANSETRON HYDROCHLORIDE 2 MG/ML
4 INJECTION, SOLUTION INTRAVENOUS EVERY 8 HOURS PRN
Status: DISCONTINUED | OUTPATIENT
Start: 2024-12-21 | End: 2024-12-27 | Stop reason: HOSPADM

## 2024-12-21 RX ORDER — PHENYLEPHRINE HCL IN 0.9% NACL 1 MG/10 ML
SYRINGE (ML) INTRAVENOUS AS NEEDED
Status: DISCONTINUED | OUTPATIENT
Start: 2024-12-21 | End: 2024-12-21

## 2024-12-21 RX ORDER — CEFAZOLIN SODIUM 2 G/100ML
2 INJECTION, SOLUTION INTRAVENOUS EVERY 8 HOURS
Status: COMPLETED | OUTPATIENT
Start: 2024-12-21 | End: 2024-12-22

## 2024-12-21 RX ORDER — LIDOCAINE HYDROCHLORIDE 20 MG/ML
INJECTION, SOLUTION INFILTRATION; PERINEURAL AS NEEDED
Status: DISCONTINUED | OUTPATIENT
Start: 2024-12-21 | End: 2024-12-21

## 2024-12-21 RX ORDER — ONDANSETRON HYDROCHLORIDE 2 MG/ML
4 INJECTION, SOLUTION INTRAVENOUS ONCE
Status: DISCONTINUED | OUTPATIENT
Start: 2024-12-21 | End: 2024-12-21 | Stop reason: HOSPADM

## 2024-12-21 RX ORDER — PROPOFOL 10 MG/ML
INJECTION, EMULSION INTRAVENOUS AS NEEDED
Status: DISCONTINUED | OUTPATIENT
Start: 2024-12-21 | End: 2024-12-21

## 2024-12-21 RX ORDER — ONDANSETRON 4 MG/1
4 TABLET, FILM COATED ORAL EVERY 8 HOURS PRN
Status: DISCONTINUED | OUTPATIENT
Start: 2024-12-21 | End: 2024-12-27 | Stop reason: HOSPADM

## 2024-12-21 RX ORDER — METOCLOPRAMIDE 10 MG/1
5 TABLET ORAL EVERY 6 HOURS PRN
Status: DISCONTINUED | OUTPATIENT
Start: 2024-12-21 | End: 2024-12-27 | Stop reason: HOSPADM

## 2024-12-21 RX ORDER — METOCLOPRAMIDE HYDROCHLORIDE 5 MG/ML
5 INJECTION INTRAMUSCULAR; INTRAVENOUS EVERY 6 HOURS PRN
Status: DISCONTINUED | OUTPATIENT
Start: 2024-12-21 | End: 2024-12-27 | Stop reason: HOSPADM

## 2024-12-21 RX ORDER — ACETAMINOPHEN 325 MG/1
650 TABLET ORAL EVERY 4 HOURS PRN
Status: DISCONTINUED | OUTPATIENT
Start: 2024-12-21 | End: 2024-12-21 | Stop reason: HOSPADM

## 2024-12-21 RX ORDER — GLYCOPYRROLATE 0.2 MG/ML
INJECTION INTRAMUSCULAR; INTRAVENOUS AS NEEDED
Status: DISCONTINUED | OUTPATIENT
Start: 2024-12-21 | End: 2024-12-21

## 2024-12-21 RX ORDER — CEFAZOLIN SODIUM 1 G/50ML
1 SOLUTION INTRAVENOUS ONCE
Status: DISCONTINUED | OUTPATIENT
Start: 2024-12-21 | End: 2024-12-21 | Stop reason: HOSPADM

## 2024-12-21 RX ORDER — CYCLOBENZAPRINE HCL 10 MG
10 TABLET ORAL 3 TIMES DAILY PRN
Status: DISCONTINUED | OUTPATIENT
Start: 2024-12-21 | End: 2024-12-27

## 2024-12-21 RX ORDER — BISACODYL 5 MG
10 TABLET, DELAYED RELEASE (ENTERIC COATED) ORAL DAILY PRN
Status: DISCONTINUED | OUTPATIENT
Start: 2024-12-21 | End: 2024-12-27 | Stop reason: HOSPADM

## 2024-12-21 RX ORDER — LIDOCAINE HCL/PF 100 MG/5ML
SYRINGE (ML) INTRAVENOUS AS NEEDED
Status: DISCONTINUED | OUTPATIENT
Start: 2024-12-21 | End: 2024-12-21

## 2024-12-21 RX ORDER — SCOPOLAMINE 1 MG/3D
1 PATCH, EXTENDED RELEASE TRANSDERMAL ONCE
Status: DISCONTINUED | OUTPATIENT
Start: 2024-12-21 | End: 2024-12-21

## 2024-12-21 RX ORDER — ONDANSETRON HYDROCHLORIDE 2 MG/ML
INJECTION, SOLUTION INTRAVENOUS AS NEEDED
Status: DISCONTINUED | OUTPATIENT
Start: 2024-12-21 | End: 2024-12-21

## 2024-12-21 RX ORDER — SODIUM CHLORIDE 9 MG/ML
50 INJECTION, SOLUTION INTRAVENOUS CONTINUOUS
Status: DISCONTINUED | OUTPATIENT
Start: 2024-12-21 | End: 2024-12-21 | Stop reason: HOSPADM

## 2024-12-21 RX ORDER — SODIUM CHLORIDE, SODIUM LACTATE, POTASSIUM CHLORIDE, CALCIUM CHLORIDE 600; 310; 30; 20 MG/100ML; MG/100ML; MG/100ML; MG/100ML
50 INJECTION, SOLUTION INTRAVENOUS CONTINUOUS
Status: DISCONTINUED | OUTPATIENT
Start: 2024-12-21 | End: 2024-12-22

## 2024-12-21 RX ORDER — IPRATROPIUM BROMIDE 0.5 MG/2.5ML
500 SOLUTION RESPIRATORY (INHALATION) ONCE
Status: DISCONTINUED | OUTPATIENT
Start: 2024-12-21 | End: 2024-12-21 | Stop reason: HOSPADM

## 2024-12-21 RX ADMIN — AMLODIPINE BESYLATE 5 MG: 5 TABLET ORAL at 08:38

## 2024-12-21 RX ADMIN — FUROSEMIDE 40 MG: 10 INJECTION, SOLUTION INTRAMUSCULAR; INTRAVENOUS at 11:41

## 2024-12-21 RX ADMIN — ACETAMINOPHEN 975 MG: 325 TABLET, FILM COATED ORAL at 08:39

## 2024-12-21 RX ADMIN — Medication 2000 UNITS: at 08:39

## 2024-12-21 RX ADMIN — CEFAZOLIN SODIUM 2 G: 2 INJECTION, SOLUTION INTRAVENOUS at 20:33

## 2024-12-21 RX ADMIN — POTASSIUM CHLORIDE 10 MEQ: 750 TABLET, EXTENDED RELEASE ORAL at 08:39

## 2024-12-21 RX ADMIN — SODIUM CHLORIDE, POTASSIUM CHLORIDE, SODIUM LACTATE AND CALCIUM CHLORIDE 50 ML/HR: 600; 310; 30; 20 INJECTION, SOLUTION INTRAVENOUS at 17:43

## 2024-12-21 RX ADMIN — TRAMADOL HYDROCHLORIDE 25 MG: 50 TABLET, COATED ORAL at 08:41

## 2024-12-21 RX ADMIN — SCOPOLAMINE 1 PATCH: 1.5 PATCH, EXTENDED RELEASE TRANSDERMAL at 12:33

## 2024-12-21 RX ADMIN — POLYETHYLENE GLYCOL 3350 17 G: 17 POWDER, FOR SOLUTION ORAL at 20:31

## 2024-12-21 RX ADMIN — LOSARTAN POTASSIUM 100 MG: 50 TABLET, FILM COATED ORAL at 08:39

## 2024-12-21 RX ADMIN — SENNOSIDES AND DOCUSATE SODIUM 2 TABLET: 50; 8.6 TABLET ORAL at 20:34

## 2024-12-21 RX ADMIN — ROSUVASTATIN CALCIUM 5 MG: 10 TABLET, FILM COATED ORAL at 20:34

## 2024-12-21 RX ADMIN — SODIUM CHLORIDE, POTASSIUM CHLORIDE, SODIUM LACTATE AND CALCIUM CHLORIDE 50 ML/HR: 600; 310; 30; 20 INJECTION, SOLUTION INTRAVENOUS at 23:28

## 2024-12-21 RX ADMIN — ESCITALOPRAM OXALATE 5 MG: 10 TABLET ORAL at 08:39

## 2024-12-21 RX ADMIN — POTASSIUM CHLORIDE 10 MEQ: 750 TABLET, EXTENDED RELEASE ORAL at 20:34

## 2024-12-21 RX ADMIN — ACETAMINOPHEN 975 MG: 325 TABLET, FILM COATED ORAL at 23:25

## 2024-12-21 RX ADMIN — APIXABAN 2.5 MG: 2.5 TABLET, FILM COATED ORAL at 20:34

## 2024-12-21 RX ADMIN — FUROSEMIDE 40 MG: 10 INJECTION, SOLUTION INTRAMUSCULAR; INTRAVENOUS at 21:26

## 2024-12-21 RX ADMIN — KETOROLAC TROMETHAMINE 15 MG: 30 INJECTION, SOLUTION INTRAMUSCULAR at 17:42

## 2024-12-21 RX ADMIN — TRAMADOL HYDROCHLORIDE 25 MG: 50 TABLET, COATED ORAL at 16:01

## 2024-12-21 RX ADMIN — ACETAMINOPHEN 975 MG: 325 TABLET, FILM COATED ORAL at 16:00

## 2024-12-21 SDOH — HEALTH STABILITY: MENTAL HEALTH: CURRENT SMOKER: 0

## 2024-12-21 ASSESSMENT — COGNITIVE AND FUNCTIONAL STATUS - GENERAL
MOVING TO AND FROM BED TO CHAIR: A LOT
STANDING UP FROM CHAIR USING ARMS: A LOT
CLIMB 3 TO 5 STEPS WITH RAILING: A LOT
TURNING FROM BACK TO SIDE WHILE IN FLAT BAD: A LOT
HELP NEEDED FOR BATHING: TOTAL
EATING MEALS: A LITTLE
EATING MEALS: A LITTLE
WALKING IN HOSPITAL ROOM: A LOT
DRESSING REGULAR UPPER BODY CLOTHING: TOTAL
TOILETING: A LOT
MOVING TO AND FROM BED TO CHAIR: A LOT
STANDING UP FROM CHAIR USING ARMS: A LOT
TOILETING: A LOT
MOVING FROM LYING ON BACK TO SITTING ON SIDE OF FLAT BED WITH BEDRAILS: A LOT
WALKING IN HOSPITAL ROOM: A LOT
DRESSING REGULAR LOWER BODY CLOTHING: TOTAL
PERSONAL GROOMING: A LOT
DAILY ACTIVITIY SCORE: 10
MOBILITY SCORE: 12
DAILY ACTIVITIY SCORE: 10
DRESSING REGULAR LOWER BODY CLOTHING: TOTAL
MOBILITY SCORE: 12
CLIMB 3 TO 5 STEPS WITH RAILING: A LOT
DRESSING REGULAR UPPER BODY CLOTHING: TOTAL
MOVING FROM LYING ON BACK TO SITTING ON SIDE OF FLAT BED WITH BEDRAILS: A LOT
HELP NEEDED FOR BATHING: TOTAL
PERSONAL GROOMING: A LOT
TURNING FROM BACK TO SIDE WHILE IN FLAT BAD: A LOT

## 2024-12-21 ASSESSMENT — PAIN SCALES - GENERAL
PAINLEVEL_OUTOF10: 0 - NO PAIN
PAIN_LEVEL: 0
PAINLEVEL_OUTOF10: 8
PAINLEVEL_OUTOF10: 0 - NO PAIN
PAINLEVEL_OUTOF10: 0 - NO PAIN
PAINLEVEL_OUTOF10: 7
PAINLEVEL_OUTOF10: 8

## 2024-12-21 ASSESSMENT — COLUMBIA-SUICIDE SEVERITY RATING SCALE - C-SSRS
2. HAVE YOU ACTUALLY HAD ANY THOUGHTS OF KILLING YOURSELF?: NO
1. IN THE PAST MONTH, HAVE YOU WISHED YOU WERE DEAD OR WISHED YOU COULD GO TO SLEEP AND NOT WAKE UP?: NO
6. HAVE YOU EVER DONE ANYTHING, STARTED TO DO ANYTHING, OR PREPARED TO DO ANYTHING TO END YOUR LIFE?: NO

## 2024-12-21 ASSESSMENT — PAIN DESCRIPTION - LOCATION: LOCATION: HIP

## 2024-12-21 ASSESSMENT — PAIN - FUNCTIONAL ASSESSMENT
PAIN_FUNCTIONAL_ASSESSMENT: 0-10

## 2024-12-21 ASSESSMENT — PAIN DESCRIPTION - ORIENTATION: ORIENTATION: LEFT

## 2024-12-21 NOTE — CARE PLAN
The patient's goals for the shift include sleep    The clinical goals for the shift include safety and comfort      Problem: Pain  Goal: Takes deep breaths with improved pain control throughout the shift  Outcome: Progressing  Goal: Turns in bed with improved pain control throughout the shift  Outcome: Progressing  Goal: Walks with improved pain control throughout the shift  Outcome: Progressing  Goal: Performs ADL's with improved pain control throughout shift  Outcome: Progressing  Goal: Participates in PT with improved pain control throughout the shift  Outcome: Progressing  Goal: Free from opioid side effects throughout the shift  Outcome: Progressing  Goal: Free from acute confusion related to pain meds throughout the shift  Outcome: Progressing     Problem: Skin  Goal: Participates in plan/prevention/treatment measures  Outcome: Progressing  Flowsheets (Taken 12/20/2024 2348)  Participates in plan/prevention/treatment measures: Elevate heels  Goal: Prevent/manage excess moisture  Outcome: Progressing  Flowsheets (Taken 12/20/2024 2348)  Prevent/manage excess moisture:   Cleanse incontinence/protect with barrier cream   Moisturize dry skin  Goal: Prevent/minimize sheer/friction injuries  Outcome: Progressing  Flowsheets (Taken 12/20/2024 2348)  Prevent/minimize sheer/friction injuries: Use pull sheet  Goal: Promote/optimize nutrition  Outcome: Progressing  Flowsheets (Taken 12/20/2024 2348)  Promote/optimize nutrition: Monitor/record intake including meals  Goal: Promote skin healing  Outcome: Progressing  Flowsheets (Taken 12/20/2024 2348)  Promote skin healing: Assess skin/pad under line(s)/device(s)

## 2024-12-21 NOTE — PROGRESS NOTES
"Subjective Data:  Patient reports she is breathing better today  She does have leg pain  No chest pain or palpitations  Generally weak    Overnight Events:    Elevated blood pressure     Objective Data:  Last Recorded Vitals:  Vitals:    12/20/24 2011 12/21/24 0442 12/21/24 0752 12/21/24 1202   BP: 114/58 125/78 133/82 123/65   BP Location: Left arm Left arm Left arm    Patient Position: Lying Lying Lying    Pulse: 75 75  86   Resp: 18 18  18   Temp: 36.5 °C (97.7 °F) 37.7 °C (99.9 °F) 37.4 °C (99.3 °F) 37.8 °C (100 °F)   TempSrc: Temporal Temporal Temporal Temporal   SpO2: 97% 95% 96% 96%   Weight:       Height:    1.575 m (5' 2\")       Last Labs:  CBC - 12/21/2024:  6:39 AM  7.4 11.9 151    36.6      CMP - 12/21/2024:  6:39 AM  9.2 8.4 22 --- 1.5   _ 4.7 19 87      PTT - No results in last year.  1.3   14.2 _     TROPHS   Date/Time Value Ref Range Status   04/26/2023 04:14 PM 56 0 - 13 ng/L Final     Comment:     .  Less than 99th percentile of normal range cutoff-  Female and children under 18 years old <14 ng/L; Male <21 ng/L: Negative  Repeat testing should be performed if clinically indicated.   .  Female and children under 18 years old 14-50 ng/L; Male 21-50 ng/L:  Consistent with possible cardiac damage and possible increased clinical   risk. Serial measurements may help to assess extent of myocardial damage.   .  >50 ng/L: Consistent with cardiac damage, increased clinical risk and  myocardial infarction. Serial measurements may help assess extent of   myocardial damage.   .   NOTE: Children less than 1 year old may have higher baseline troponin   levels and results should be interpreted in conjunction with the overall   clinical context.   .  NOTE: Troponin I testing is performed using a different   testing methodology at [facility] than at other   [facility] hospitals. Direct result comparisons should only   be made within the same method.  RESULTS RB TO BAR RICH, 04/26/2023 17:20   "   04/26/2023 09:57 AM 36 0 - 13 ng/L Final     Comment:     .  Less than 99th percentile of normal range cutoff-  Female and children under 18 years old <14 ng/L; Male <21 ng/L: Negative  Repeat testing should be performed if clinically indicated.   .  Female and children under 18 years old 14-50 ng/L; Male 21-50 ng/L:  Consistent with possible cardiac damage and possible increased clinical   risk. Serial measurements may help to assess extent of myocardial damage.   .  >50 ng/L: Consistent with cardiac damage, increased clinical risk and  myocardial infarction. Serial measurements may help assess extent of   myocardial damage.   .   NOTE: Children less than 1 year old may have higher baseline troponin   levels and results should be interpreted in conjunction with the overall   clinical context.   .  NOTE: Troponin I testing is performed using a different   testing methodology at [facility] than at [facility]. Direct result comparisons should only   be made within the same method.     04/22/2023 02:40 PM 23 0 - 13 ng/L Final     Comment:     .  Less than 99th percentile of normal range cutoff-  Female and children under 18 years old <14 ng/L; Male <21 ng/L: Negative  Repeat testing should be performed if clinically indicated.   .  Female and children under 18 years old 14-50 ng/L; Male 21-50 ng/L:  Consistent with possible cardiac damage and possible increased clinical   risk. Serial measurements may help to assess extent of myocardial damage.   .  >50 ng/L: Consistent with cardiac damage, increased clinical risk and  myocardial infarction. Serial measurements may help assess extent of   myocardial damage.   .   NOTE: Children less than 1 year old may have higher baseline troponin   levels and results should be interpreted in conjunction with the overall   clinical context.   .  NOTE: Troponin I testing is performed using a different   testing methodology at [facility] than  at other   system hospitals. Direct result comparisons should only   be made within the same method.       BNP   Date/Time Value Ref Range Status   12/18/2024 07:15  0 - 99 pg/mL Final   04/26/2023 09:57  0 - 99 pg/mL Final     Comment:     .  <100 pg/mL - Heart failure unlikely  100-299 pg/mL - Intermediate probability of acute heart  .               failure exacerbation. Correlate with clinical  .               context and patient history.    >=300 pg/mL - Heart Failure likely. Correlate with clinical  .               context and patient history.  BNP testing is performed using different testing   methodology at [facility] than at other   [facility]. Direct result comparisons should   only be made within the same method.     04/11/2019 09:56  0 - 99 pg/mL Final     Comment:     .  <100 pg/mL - Heart failure unlikely  100-299 pg/mL - Intermediate probability of acute heart  .               failure exacerbation. Correlate with clinical  .               context and patient history.    >=300 pg/mL - Heart Failure likely. Correlate with clinical  .               context and patient history.  BNP testing is performed using different testing   methodology at [facility] than at other   [facility]. Direct result comparisons should   only be made within the same method.        Last I/O:  I/O last 3 completed shifts:  In: - (0 mL/kg)   Out: 2625 (48.2 mL/kg) [Urine:2625 (1.3 mL/kg/hr)]  Weight: 54.4 kg     Past Cardiology Tests (Last 3 Years):  EKG:  No telemetry available  Echo:  Transthoracic Echo (TTE) Complete 05/21/2024    Ejection Fractions:  55 to 60%  Cath:  No results found for this or any previous visit from the past 1095 days.    Stress Test:  No results found for this or any previous visit from the past 1095 days.    Cardiac Imaging:  Chest x-ray with mild pulmonary vascular congestion      Inpatient Medications:  Scheduled medications   Medication Dose  Route Frequency    [Transfer Hold] acetaminophen  975 mg oral q8h    [Transfer Hold] amLODIPine  5 mg oral Daily    [Held by provider] apixaban  2.5 mg oral BID    [Transfer Hold] cholecalciferol  2,000 Units oral Daily    [Transfer Hold] escitalopram  5 mg oral Daily    [Transfer Hold] furosemide  40 mg intravenous q12h    [Transfer Hold] losartan  100 mg oral Daily    [Transfer Hold] potassium chloride CR  10 mEq oral BID    [Transfer Hold] rosuvastatin  5 mg oral Nightly    [Transfer Hold] sennosides-docusate sodium  2 tablet oral Nightly    [Held by provider] torsemide  30 mg oral Daily     PRN medications   Medication    [Transfer Hold] bisacodyl    [Transfer Hold] guaiFENesin    [Transfer Hold] ipratropium-albuteroL    [Transfer Hold] magnesium hydroxide    [Transfer Hold] melatonin    [Transfer Hold] ondansetron    [Transfer Hold] polyethylene glycol    [Transfer Hold] traMADol     Continuous Medications   Medication Dose Last Rate       Physical Exam:  Constitutional/General: Frail, elderly female who is alert and oriented and answers questions appropriately.  She is mildly increased work of respiration.   Neck: No increased JVP,no carotid bruits.  Respiratory:  Lungs with diminished breath sounds.  No respiratory distress  Cardiovascular: Heart is irregular irregular rhythm with rate controlled with a 1/6 systolic ejection murmur at the right upper sternal border.  No gallops, or rubs, PMI nondisplaced, 2+ distal pulses  Chest:  No chest wall tenderness  GI:  Abdomen soft, nontender, nondistended, + BS, no organomegaly, no palpable masses, no rebound, guarding, or rigidity  Musculoskeletal: Decreased mobility of left lower extremity  Extremities: No pretibial pitting peripheral edema  Integument: Skin warm and dry, no rashes  Neurologic:  No focal deficits  Psychiatric:  Normal affect     Assessment/Plan   Ermelinda Benoit is a 89 y.o. female with history of permanent atrial fibrillation on Eliquis,  status post 2018 TAVR for aortic stenosis (Evolute 26), heart failure with preserved ejection action, hypertension, stroke, hyperlipidemia presenting with hip fracture and now needs open reduction internal fixation and cardiology was consulted for preop eval.  1.  Heart failure preserved ejection fraction-on admission her exam and chest x-ray and BNP were all consistent with mild volume overload.  In addition she admitted to some shortness of breath.  We stopped her IV fluids and gave her a dose of IV Lasix.  Still with signs and symptoms of CHF 12/20-diuresed more aggressively.  Signs symptoms of CHF appear improved today.    2.  Preop evaluation-patient in need of open reduction total fixation of left hip.  Per the revised cardiac risk index she would be at moderate risk for cardiac morbidity and mortality.  Signs and symptoms of CHF now improved therefore proceed with surgery today  3.  Status post TAVR-stable per exam and 5/2024 echo  4.  Hypertension-blood pressure better controlled after adding amlodipine  -Will follow with you postop as needed  Peripheral IV 12/18/24 20 G 3 cm Left;Proximal Forearm (Active)   Site Assessment Clean;Dry 12/20/24 0900   Dressing Status Clean;Dry 12/20/24 0900   Number of days: 2       Code Status:  Full Code          Jean Guardado DO

## 2024-12-21 NOTE — PROGRESS NOTES
Ermelinda Benoit is a 89 y.o. female on day 2 of admission presenting with Fall, initial encounter.      Subjective   No events overnight. Patient seen and examined at bedside. Resting comfortably in bed.       Objective     Last Recorded Vitals  /58 (BP Location: Left arm, Patient Position: Lying)   Pulse 75   Temp 36.5 °C (97.7 °F) (Temporal)   Resp 18   Wt 54.4 kg (120 lb)   SpO2 97%   Intake/Output last 3 Shifts:    Intake/Output Summary (Last 24 hours) at 12/20/2024 2300  Last data filed at 12/20/2024 1333  Gross per 24 hour   Intake --   Output 2000 ml   Net -2000 ml       Admission Weight  Weight: 54.4 kg (120 lb) (12/18/24 2205)    Daily Weight  12/18/24 : 54.4 kg (120 lb)    Image Results  XR chest 1 view  Narrative: Interpreted By:  Tim Sue,   STUDY:  XR CHEST 1 VIEW;  12/18/2024 9:53 pm      INDICATION:  Signs/Symptoms:Surgery clearance, SOB.          COMPARISON:  X-ray chest 05/15/2024. CT chest 05/17/2024.      ACCESSION NUMBER(S):  [id number]      ORDERING CLINICIAN:  FIORELLA CORDOBA      FINDINGS:  AP radiograph of the chest was provided.      The heart is enlarged with mild venous congestion. Aortic stent graft  is in place. Calcification of the trachea and bilateral mainstem  bronchi. Slightly increased interstitial lung markings are evident.  Retrocardiac opacity is roughly similar to prior exam. No  pneumothorax. No acute osseous abnormality.      Impression: 1.  Slightly increased interstitial lung markings may represent mild  edema, viral bronchiolitis, or chronic airways disease.  2. Stable retrocardiac opacity, likely representing chronic  atelectasis.  3. Cardiomegaly with mild venous congestion.              MACRO:  None      Signed by: Tim Sue 12/18/2024 11:04 PM  Dictation workstation:   [id number]  CT head wo IV contrast, CT cervical spine wo IV contrast  Narrative: Interpreted By:  Carloz Mcconnell,   STUDY:  CT HEAD WO IV CONTRAST; CT CERVICAL SPINE WO IV  CONTRAST;  12/18/2024  6:43 pm      INDICATION:  Signs/Symptoms:HIA, distracting injuries, no focal neurologic  deficits or physical exam findings on head/c spine      COMPARISON:  07/07/2023      ACCESSION NUMBER(S):  [id number]; [id number]      ORDERING CLINICIAN:  BISI CHI      TECHNIQUE:  Axial noncontrast CT images of head with coronal and sagittal  reconstructed images. Axial noncontrast CT images of the cervical  spine with coronal and sagittal reconstructed images.      FINDINGS:  Demineralization limits sensitivity      CT HEAD:      Left frontal and left occipital encephalomalacia again seen.  Periventricular low attenuation compatible with moderate chronic  small vessel ischemic change. No acute hemorrhage. No mass effect  Global volume loss. No acute hemorrhage. Vascular calcification.  Paranasal sinuses and mastoids otherwise clear.      CT CERVICAL SPINE:      Scattered multilevel degenerative disc disease is seen.  Demineralization. No findings of acute cervical spine fracture.  Vascular calcification.      Impression: CT HEAD:  No acute intracranial abnormality or calvarial fracture.  Chronic findings.      CT CERVICAL SPINE:  No acute fracture or traumatic malalignment of the cervical spine.  Multilevel degenerative disc disease. Demineralization limits  sensitivity. Vascular calcification      Signed by: Carloz Mcconnell 12/18/2024 7:04 PM  Dictation workstation:   [id number]  XR hip left with pelvis when performed 2 or 3 views  Narrative: Interpreted By:  Jeanne Bourne,   STUDY:  Single view pelvis.  Left hip, two views.      INDICATION:  Signs/Symptoms:left hip pain after fall.      COMPARISON:  Radiographs 02/17/2009.      ACCESSION NUMBER(S):  [id number]      ORDERING CLINICIAN:  BISI CHI      FINDINGS:  There is intertrochanteric left proximal femoral fracture, paroxysmal  appreciated on the frogleg lateral view. Bones appear demineralized.  Severe left hip joint  degenerative changes with interval progression.  Right hip arthroplasty hardware is intact in the AP view. Bones  appear demineralized. Soft tissues are within normal limits.      Impression: 1. Intertrochanteric left proximal femoral fracture.  2. Severe left hip joint degenerative changes with interval  progression.      MACRO:  None.      Signed by: Jeanne Bourne 2024 6:50 PM  Dictation workstation:   [id number]      Physical Exam  Constitutional:       General: She is awake. She is not in acute distress.     Appearance: Normal appearance. She is underweight. She is not toxic-appearing.      Interventions: Nasal cannula in place.   HENT:      Head: Normocephalic and atraumatic.      Nose: Nose normal. No rhinorrhea.      Mouth/Throat:      Mouth: Mucous membranes are moist.   Eyes:      General:         Right eye: No discharge.         Left eye: No discharge.      Conjunctiva/sclera: Conjunctivae normal.      Pupils: Pupils are equal, round, and reactive to light.   Cardiovascular:      Rate and Rhythm: Normal rate and regular rhythm.      Pulses: Normal pulses.           Dorsalis pedis pulses are 2+ on the right side and 2+ on the left side.   Pulmonary:      Effort: Pulmonary effort is normal. Tachypnea present. No respiratory distress.      Breath sounds: Examination of the right-lower field reveals decreased breath sounds. Examination of the left-lower field reveals decreased breath sounds. Decreased breath sounds present. No wheezing.   Abdominal:      General: Bowel sounds are normal. There is no distension.      Palpations: Abdomen is soft.      Tenderness: There is no abdominal tenderness. There is no guarding.   Musculoskeletal:         General: Normal range of motion.      Cervical back: Normal range of motion and neck supple.      Right lower le+ Edema present.      Left lower leg: Edema present.      Comments: Left leg shortened and externally rotated.  Pain in left hip with palpation.   Pain with movement.  Able to wiggle toes bilateral.  Pulses palpable, dorsalis pedis bilateral feet.   Skin:     General: Skin is warm and dry.   Neurological:      General: No focal deficit present.      Mental Status: She is alert and oriented to person, place, and time. Mental status is at baseline.      Motor: No weakness.   Psychiatric:         Mood and Affect: Mood normal.         Behavior: Behavior normal.      Relevant Results               Assessment/Plan                  Assessment & Plan  Fall, initial encounter    Closed nondisplaced intertrochanteric fracture of left femur    Patient admitted today after mechanical fall while walking with her walker.  Patient reports she tripped and was unable to regain her balance.  Patient alert and oriented, denies LOC, or hitting her head.  CT scans negative in ED for acute abnormality.  X-ray of left hip showed intertrochanteric left proximal femoral fracture.  Orthopedic consult in place.  Due to patient's cardiac history cardiology consult placed for surgery clearance.  One-time dose of Toradol ordered for pain as patient's daughter states patient cannot tolerate narcotics and becomes difficult to manage when given.  Daughter is Alexus blankenship, she is POA and would like to be consulted/called when patient is getting ready for surgery.  Her phone number is [phone].     Patient admitted to Dr. Mckay Arita for further medical management.      # Intertrochanteric left proximal femoral fracture  # Mechanical fall  # Dehydration  # Increased back/leg pain  -Consult to orthopedic surgery  -Cardiology consult placed for surgery clearance  -Type and screen/coag labs completed  -Chest x-ray ordered  -As needed antiemetics  -As needed analgesics  -EKG ordered, and as needed  -As needed oxygen >92%  -PT/OT/SW   -Fall precautions  -NPO diet, effective midnight for procedure.  -LR at 50 mL/HR  -admitted to inpatient   -q4 vitals   -morning labs, CBC, BMP     Chronic  Conditions   # CVA  # COPD  # CHF  # A-fib on Eliquis  # Breast CA with lumpectomy  # Hypertension     Continue home medications as ordered   Full Code      #DVT Prophylaxis   Scd's as tolerated   DVT Prophylaxis on Eliquis, hold for procedure.     12/20: Patient is being diuresed to optimize for surgery. She diuresed 1.2L yesterday. Continue current management and reassess for surgical readiness tomorrow.               Mckay Arita, DO

## 2024-12-21 NOTE — ANESTHESIA PREPROCEDURE EVALUATION
Patient: Ermelinda Benoit    Procedure Information       Date/Time: 12/21/24 1300    Procedure: LEFT HIP OPEN REDUCTION INTERNAL FIXATION WITH C-ARM (Left: Hip)    Location: [facility] / [facility]    Surgeons: Myles Leggett MD            Relevant Problems   Cardiac   (+) Aortic prosthetic valve regurgitation   (+) Atrioventricular block, complete (Multi)   (+) Chronic diastolic congestive heart failure   (+) Hypertension, essential   (+) Longstanding persistent atrial fibrillation (Multi)   (+) Nonrheumatic aortic (valve) stenosis   (+) Peripheral vascular disease (CMS-HCC)   (+) Permanent atrial fibrillation (Multi)      Pulmonary   (+) COPD (chronic obstructive pulmonary disease) (Multi)   (+) Influenza with pneumonia      Neuro   (+) Cerebrovascular accident (CVA) due to embolism of left posterior cerebral artery (Multi)   (+) Current mild episode of major depressive disorder (CMS-HCC)      /Renal   (+) Urinary tract infection      Endocrine   (+) Nontoxic multinodular goiter      Hematology   (+) Anemia      ID   (+) Influenza with pneumonia   (+) Urinary tract infection       Clinical information reviewed:   Tobacco  Allergies  Meds  Problems  Med Hx  Surg Hx   Fam Hx  Soc   Hx        NPO Detail:  No data recorded     Physical Exam    Airway  Mallampati: III  TM distance: <3 FB  Neck ROM: full     Cardiovascular   Rhythm: irregular  Rate: normal     Dental - normal exam     Pulmonary   Breath sounds clear to auscultation  (+) decreased breath sounds     Abdominal      Other findings: Patient on 3L nc        Anesthesia Plan    History of general anesthesia?: yes  History of complications of general anesthesia?: no    ASA 3     general     The patient is not a current smoker.    intravenous induction   Postoperative administration of opioids is intended.  Trial extubation is planned.  Anesthetic plan and risks discussed with patient.  Use of blood products discussed with patient who consented to  blood products.    Plan discussed with CRNA.

## 2024-12-21 NOTE — CARE PLAN
The patient's goals for the shift include sleep    The clinical goals for the shift include rest      Problem: Pain  Goal: Takes deep breaths with improved pain control throughout the shift  Outcome: Progressing  Goal: Turns in bed with improved pain control throughout the shift  Outcome: Progressing  Goal: Walks with improved pain control throughout the shift  Outcome: Progressing  Goal: Performs ADL's with improved pain control throughout shift  Outcome: Progressing  Goal: Participates in PT with improved pain control throughout the shift  Outcome: Progressing  Goal: Free from opioid side effects throughout the shift  Outcome: Progressing  Goal: Free from acute confusion related to pain meds throughout the shift  Outcome: Progressing     Problem: Skin  Goal: Participates in plan/prevention/treatment measures  Outcome: Progressing  Goal: Prevent/manage excess moisture  Outcome: Progressing  Goal: Prevent/minimize sheer/friction injuries  Outcome: Progressing  Goal: Promote/optimize nutrition  Outcome: Progressing  Goal: Promote skin healing  Outcome: Progressing

## 2024-12-21 NOTE — ANESTHESIA PROCEDURE NOTES
Airway  Date/Time: 12/21/2024 12:53 PM  Urgency: elective    Airway not difficult    Staffing  Performed: CRNA   Authorized by: AVEL Cantrell    Performed by: AVEL Cantrell  Patient location during procedure: OR    Indications and Patient Condition  Indications for airway management: anesthesia and airway protection  Spontaneous ventilation: present  Sedation level: deep  Preoxygenated: yes  Patient position: sniffing  MILS maintained throughout  Mask difficulty assessment: 0 - not attempted  Planned trial extubation    Final Airway Details  Final airway type: supraglottic airway      Successful airway: ProSeal  Size 4     Number of attempts at approach: 1

## 2024-12-21 NOTE — OP NOTE
LEFT HIP OPEN REDUCTION INTERNAL FIXATION WITH C-ARM (L) Operative Note     Date: 2024 - 2024  OR Location: [facility]    Name: Ermelinda Benoit, : 3/14/1933, Age: 89 y.o., MRN: [id number], Sex: female    Diagnosis  Pre-op Diagnosis      * Fall, initial encounter [W19.XXXA]     * Closed nondisplaced intertrochanteric fracture of left femur, initial encounter [S72.145A] Post-op Diagnosis     * Fall, initial encounter [W19.XXXA]     * Closed nondisplaced intertrochanteric fracture of left femur, initial encounter [S72.145A]     Procedures  LEFT HIP OPEN REDUCTION INTERNAL FIXATION WITH C-ARM  33753 - VT TX INTER/VT/SUBTRCHNTRIC FEM FX IMED IMPLTSCREW      Surgeons      * Myles Leggett - Primary    Resident/Fellow/Other Assistant:  Surgeons and Role:  * No surgeons found with a matching role *    Staff:   Circulator: Lona  Surgical Assistant: Nestor Kaufman Person: Tayler    Anesthesia Staff: CRNA: MARK Cantrell-CRNA    Procedure Summary  Anesthesia: General  ASA: III  Estimated Blood Loss: 100 mL  Intra-op Medications:   Administrations occurring from 1300 to 1540 on 24:   Medication Name Total Dose   acetaminophen (Tylenol) tablet 975 mg Cannot be calculated   ceFAZolin (Ancef) vial 1 g 1 g   dexAMETHasone (Decadron) injection 4 mg/mL 4 mg   fentaNYL (Sublimaze) injection 50 mcg/mL 75 mcg   glycopyrrolate (Robinul) injection 0.2 mg   lidocaine (Xylocaine) injection 2 % 30 mg   ondansetron (Zofran) 2 mg/mL injection 4 mg   propofol (Diprivan) injection 10 mg/mL 100 mg   tranexamic acid IV 1,000 mg in 100 mL NaCl (iso) - premix 1,000 mg              Anesthesia Record               Intraprocedure I/O Totals          Intake    Tranexamic Acid 0.00 mL    The total shown is the total volume documented since Anesthesia Start was filed.    Total Intake 0 mL          Specimen: No specimens collected              Drains and/or Catheters: * None in log *    Tourniquet Times:          Implants:  Implants       Type Name Action Serial No.      Joint Hip NAIL, TFN ADV SHORT, 170MML, DIAMETER 10, 125 DEG - [id number] Implanted      Implant HELICAL BLADES, TFNA FENESTRATED, 85MM, STERILE - [id number] Implanted      Screw SCREW, LOCKING 5.0MM X 34MM TI STERILE - [id number] Implanted               Findings: Poor quality proximal femur bone stock    Indications: Ermelinda Benoit is an 89 y.o. female who is having surgery for Fall, initial encounter [W19.XXXA]  Closed nondisplaced intertrochanteric fracture of left femur, initial encounter [S72.145A].  The patient received cardiac and medical clearance prior to surgery.    The patient was seen in the preoperative area. The risks, benefits, complications, treatment options, non-operative alternatives, expected recovery and outcomes were discussed with the patient. The possibilities of reaction to medication, pulmonary aspiration, injury to surrounding structures, bleeding, recurrent infection, malunion, nonunion, hardware failure, the need for additional procedures, failure to diagnose a condition, and creating a complication requiring transfusion or operation were discussed with the patient. The patient concurred with the proposed plan, giving informed consent.  The site of surgery was properly noted/marked if necessary per policy. The patient has been actively warmed in preoperative area. Preoperative antibiotics have been ordered and given within 1 hours of incision. Venous thrombosis prophylaxis have been ordered including unilateral sequential compression device    Procedure Details: The patient was seen in the preanesthesia area with operative site was marked and consent reviewed.  She was then taken back to the surgical suite.  A surgical huddle was performed reviewing patient's name, indicated procedure and correct operative site.  General anesthesia was then induced via LMA.  The patient was then transferred on the [place] fracture table.  All  bony prominences were well-padded.  The right lower extremity was placed in a well-leg tanner with the peroneal nerve well-padded.  A well-padded perineal post was used.  The left lower extremity was placed in a fracture boot.  The fracture was then reduced with slight traction and internal rotation.  This was confirmed by multiplanar fluoroscopy.  The left lower extremity was then prepped and draped in sterile fashion.  Once the draping is completed a surgical pause was performed reviewing patient's name, indicated procedure and correct operative site.  Patient received 1 g of Ancef and TXA prior to making incision.  First a 5 cm incision was made at the tip of the greater trochanter.  The skin was incised sharply down to the subcutaneous tissues.  The fascia was then incised in line to skin incision.  The tip of the greater trochanter was then palpated and then a 3.2 mm guidepin was placed into the proximal femur under fluoroscopic guidance by hand due to poor bone quality.  The opening reamer was then used over the guidepin.  Then a Synthes 125 degree, 10 mm diameter 170 mm length TFN was placed into the femur.  A secondary 5 cm incision was made for the guidepin.  The guidepin was then placed in the center center position and measured to an 85 mm blade.  The lateral cortex reamer was used followed by the step drill.  Then the 85 mm helical blade was malleted into place.  It was then locked from above per 's recommendation to finger tight and then turning counterclockwise 180 degrees.  The distal locking screw was drilled and then a 42 mm, 5.0 locking screw was placed.  Final images showed overall satisfactory alignment of the fracture and hardware.  The wounds were copiously irrigated with Irrisept and sterile saline.  The deep fascia was closed with #1 Vicryl sutures.  The subcutaneous tissues were closed with 2-0 Vicryl suture.  The skin was closed with staples.  The extremity was cleaned and  dried.  A Mepilex dressing was applied.  The drapes were removed.  The patient was extubated and then transferred to hospital bed and taken to the PACU in stable condition.  Complications:  None; patient tolerated the procedure well.    Disposition: PACU - hemodynamically stable.  Condition: stable         Task Performed by RNFA or Surgical Assistant:  An assistant was required for patient positioning, retraction and wound closure.          Additional Details:     Attending Attestation: I was present for the entire procedure.    Myles Leggett  Phone Number: [phone]

## 2024-12-21 NOTE — PROGRESS NOTES
Ermelinda Benoit is a 89 y.o. female on day 3 of admission presenting with Fall, initial encounter.      Subjective   No events overnight. Patient seen and examined at bedside. Resting comfortably in bed. She is on room air.       Objective     Last Recorded Vitals  /82 (BP Location: Left arm, Patient Position: Lying)   Pulse 75   Temp 37.4 °C (99.3 °F) (Temporal)   Resp 18   Wt 54.4 kg (120 lb)   SpO2 96%   Intake/Output last 3 Shifts:    Intake/Output Summary (Last 24 hours) at 12/21/2024 0840  Last data filed at 12/21/2024 0442  Gross per 24 hour   Intake --   Output 1825 ml   Net -1825 ml       Admission Weight  Weight: 54.4 kg (120 lb) (12/18/24 2205)    Daily Weight  12/18/24 : 54.4 kg (120 lb)    Image Results  XR chest 1 view  Narrative: Interpreted By:  Tim Sue,   STUDY:  XR CHEST 1 VIEW;  12/18/2024 9:53 pm      INDICATION:  Signs/Symptoms:Surgery clearance, SOB.          COMPARISON:  X-ray chest 05/15/2024. CT chest 05/17/2024.      ACCESSION NUMBER(S):  [id number]      ORDERING CLINICIAN:  FIORELLA CORDOBA      FINDINGS:  AP radiograph of the chest was provided.      The heart is enlarged with mild venous congestion. Aortic stent graft  is in place. Calcification of the trachea and bilateral mainstem  bronchi. Slightly increased interstitial lung markings are evident.  Retrocardiac opacity is roughly similar to prior exam. No  pneumothorax. No acute osseous abnormality.      Impression: 1.  Slightly increased interstitial lung markings may represent mild  edema, viral bronchiolitis, or chronic airways disease.  2. Stable retrocardiac opacity, likely representing chronic  atelectasis.  3. Cardiomegaly with mild venous congestion.              MACRO:  None      Signed by: Tim Sue 12/18/2024 11:04 PM  Dictation workstation:   [id number]  CT head wo IV contrast, CT cervical spine wo IV contrast  Narrative: Interpreted By:  Carloz Mcconnell,   STUDY:  CT HEAD WO IV CONTRAST; CT  CERVICAL SPINE WO IV CONTRAST;  12/18/2024  6:43 pm      INDICATION:  Signs/Symptoms:HIA, distracting injuries, no focal neurologic  deficits or physical exam findings on head/c spine      COMPARISON:  07/07/2023      ACCESSION NUMBER(S):  [id number]; [id number]      ORDERING CLINICIAN:  BISI CHI      TECHNIQUE:  Axial noncontrast CT images of head with coronal and sagittal  reconstructed images. Axial noncontrast CT images of the cervical  spine with coronal and sagittal reconstructed images.      FINDINGS:  Demineralization limits sensitivity      CT HEAD:      Left frontal and left occipital encephalomalacia again seen.  Periventricular low attenuation compatible with moderate chronic  small vessel ischemic change. No acute hemorrhage. No mass effect  Global volume loss. No acute hemorrhage. Vascular calcification.  Paranasal sinuses and mastoids otherwise clear.      CT CERVICAL SPINE:      Scattered multilevel degenerative disc disease is seen.  Demineralization. No findings of acute cervical spine fracture.  Vascular calcification.      Impression: CT HEAD:  No acute intracranial abnormality or calvarial fracture.  Chronic findings.      CT CERVICAL SPINE:  No acute fracture or traumatic malalignment of the cervical spine.  Multilevel degenerative disc disease. Demineralization limits  sensitivity. Vascular calcification      Signed by: Carloz Mcconnell 12/18/2024 7:04 PM  Dictation workstation:   [id number]  XR hip left with pelvis when performed 2 or 3 views  Narrative: Interpreted By:  Jeanne Bourne,   STUDY:  Single view pelvis.  Left hip, two views.      INDICATION:  Signs/Symptoms:left hip pain after fall.      COMPARISON:  Radiographs 02/17/2009.      ACCESSION NUMBER(S):  [id number]      ORDERING CLINICIAN:  BISI CHI      FINDINGS:  There is intertrochanteric left proximal femoral fracture, paroxysmal  appreciated on the frogleg lateral view. Bones appear demineralized.  Severe  left hip joint degenerative changes with interval progression.  Right hip arthroplasty hardware is intact in the AP view. Bones  appear demineralized. Soft tissues are within normal limits.      Impression: 1. Intertrochanteric left proximal femoral fracture.  2. Severe left hip joint degenerative changes with interval  progression.      MACRO:  None.      Signed by: Jeanne Bourne 2024 6:50 PM  Dictation workstation:   [id number]      Physical Exam  Constitutional:       General: She is awake. She is not in acute distress.     Appearance: Normal appearance. She is underweight. She is not toxic-appearing.      Interventions: Nasal cannula in place.   HENT:      Head: Normocephalic and atraumatic.      Nose: Nose normal. No rhinorrhea.      Mouth/Throat:      Mouth: Mucous membranes are moist.   Eyes:      General:         Right eye: No discharge.         Left eye: No discharge.      Conjunctiva/sclera: Conjunctivae normal.      Pupils: Pupils are equal, round, and reactive to light.   Cardiovascular:      Rate and Rhythm: Normal rate and regular rhythm.      Pulses: Normal pulses.           Dorsalis pedis pulses are 2+ on the right side and 2+ on the left side.   Pulmonary:      Effort: Pulmonary effort is normal. Tachypnea present. No respiratory distress.      Breath sounds: Examination of the right-lower field reveals decreased breath sounds. Examination of the left-lower field reveals decreased breath sounds. Decreased breath sounds present. No wheezing.   Abdominal:      General: Bowel sounds are normal. There is no distension.      Palpations: Abdomen is soft.      Tenderness: There is no abdominal tenderness. There is no guarding.   Musculoskeletal:         General: Normal range of motion.      Cervical back: Normal range of motion and neck supple.      Right lower le+ Edema present.      Left lower leg: Edema present.      Comments: Left leg shortened and externally rotated.  Pain in left hip  with palpation.  Pain with movement.  Able to wiggle toes bilateral.  Pulses palpable, dorsalis pedis bilateral feet.   Skin:     General: Skin is warm and dry.   Neurological:      General: No focal deficit present.      Mental Status: She is alert and oriented to person, place, and time. Mental status is at baseline.      Motor: No weakness.   Psychiatric:         Mood and Affect: Mood normal.         Behavior: Behavior normal.      Relevant Results               Assessment/Plan                  Assessment & Plan  Fall, initial encounter    Closed nondisplaced intertrochanteric fracture of left femur    Patient admitted today after mechanical fall while walking with her walker.  Patient reports she tripped and was unable to regain her balance.  Patient alert and oriented, denies LOC, or hitting her head.  CT scans negative in ED for acute abnormality.  X-ray of left hip showed intertrochanteric left proximal femoral fracture.  Orthopedic consult in place.  Due to patient's cardiac history cardiology consult placed for surgery clearance.  One-time dose of Toradol ordered for pain as patient's daughter states patient cannot tolerate narcotics and becomes difficult to manage when given.  Daughter is Alexus blankenship, she is POA and would like to be consulted/called when patient is getting ready for surgery.  Her phone number is [phone].     Patient admitted to Dr. Mckay Arita for further medical management.      # Intertrochanteric left proximal femoral fracture  # Mechanical fall  # Dehydration  # Increased back/leg pain  -Consult to orthopedic surgery  -Cardiology consult placed for surgery clearance  -Type and screen/coag labs completed  -Chest x-ray ordered  -As needed antiemetics  -As needed analgesics  -EKG ordered, and as needed  -As needed oxygen >92%  -PT/OT/SW   -Fall precautions  -NPO diet, effective midnight for procedure.  -LR at 50 mL/HR  -admitted to inpatient   -q4 vitals   -morning labs, CBC,  BMP     Chronic Conditions   # CVA  # COPD  # CHF  # A-fib on Eliquis  # Breast CA with lumpectomy  # Hypertension     Continue home medications as ordered   Full Code      #DVT Prophylaxis   Scd's as tolerated   DVT Prophylaxis on Eliquis, hold for procedure.     12/20: Patient is being diuresed to optimize for surgery. She diuresed 1.2L yesterday. Continue current management and reassess for surgical readiness tomorrow.     12/21: Patient diuresed ~2L in the past 24 hours. She is on room air. Lab work is stable. She is medically cleared for surgery today.              Mckay Arita, DO

## 2024-12-21 NOTE — ANESTHESIA POSTPROCEDURE EVALUATION
Patient: Ermelinda Benoit    Procedure Summary       Date: 12/21/24 Room / Location: [facility] OR 08 / Virtual [facility]    Anesthesia Start: 1248 Anesthesia Stop:     Procedure: LEFT HIP OPEN REDUCTION INTERNAL FIXATION WITH C-ARM (Left: Hip) Diagnosis:       Fall, initial encounter      Closed nondisplaced intertrochanteric fracture of left femur, initial encounter      (Fall, initial encounter [W19.XXXA])      (Closed nondisplaced intertrochanteric fracture of left femur, initial encounter [S72.145A])    Surgeons: Myles Leggett MD Responsible Provider: Leo Acevedo MD    Anesthesia Type: general ASA Status: 3            Anesthesia Type: general    Vitals Value Taken Time   /71 12/21/24 1401   Temp 37.3 12/21/24 1401   Pulse 95 12/21/24 1401   Resp 18 12/21/24 1401   SpO2 100% 12/21/24 1401       Anesthesia Post Evaluation    Patient location during evaluation: PACU  Patient participation: complete - patient participated  Level of consciousness: sleepy but conscious  Pain score: 0  Pain management: adequate  Airway patency: patent  Cardiovascular status: acceptable  Respiratory status: acceptable  Hydration status: acceptable  Postoperative Nausea and Vomiting: none        There were no known notable events for this encounter.

## 2024-12-22 VITALS
OXYGEN SATURATION: 97 % | SYSTOLIC BLOOD PRESSURE: 96 MMHG | HEIGHT: 62 IN | TEMPERATURE: 97 F | DIASTOLIC BLOOD PRESSURE: 54 MMHG | WEIGHT: 120 LBS | HEART RATE: 51 BPM | BODY MASS INDEX: 22.08 KG/M2 | RESPIRATION RATE: 16 BRPM

## 2024-12-22 LAB
ANION GAP SERPL CALC-SCNC: 14 MMOL/L (ref 10–20)
BUN SERPL-MCNC: 40 MG/DL (ref 6–23)
CALCIUM SERPL-MCNC: 8.8 MG/DL (ref 8.6–10.3)
CHLORIDE SERPL-SCNC: 101 MMOL/L (ref 98–107)
CO2 SERPL-SCNC: 27 MMOL/L (ref 21–32)
CREAT SERPL-MCNC: 1.29 MG/DL (ref 0.5–1.05)
EGFRCR SERPLBLD CKD-EPI 2021: 39 ML/MIN/1.73M*2
ERYTHROCYTE [DISTWIDTH] IN BLOOD BY AUTOMATED COUNT: 11.9 % (ref 11.5–14.5)
GLUCOSE SERPL-MCNC: 114 MG/DL (ref 74–99)
HCT VFR BLD AUTO: 35.2 % (ref 36–46)
HGB BLD-MCNC: 11.4 G/DL (ref 12–16)
MCH RBC QN AUTO: 31.1 PG (ref 26–34)
MCHC RBC AUTO-ENTMCNC: 32.4 G/DL (ref 32–36)
MCV RBC AUTO: 96 FL (ref 80–100)
NRBC BLD-RTO: 0 /100 WBCS (ref 0–0)
PLATELET # BLD AUTO: 152 X10*3/UL (ref 150–450)
POTASSIUM SERPL-SCNC: 4.6 MMOL/L (ref 3.5–5.3)
RBC # BLD AUTO: 3.67 X10*6/UL (ref 4–5.2)
SODIUM SERPL-SCNC: 137 MMOL/L (ref 136–145)
WBC # BLD AUTO: 7.8 X10*3/UL (ref 4.4–11.3)

## 2024-12-22 PROCEDURE — 85027 COMPLETE CBC AUTOMATED: CPT | Performed by: INTERNAL MEDICINE

## 2024-12-22 PROCEDURE — 2500000001 HC RX 250 WO HCPCS SELF ADMINISTERED DRUGS (ALT 637 FOR MEDICARE OP): Performed by: REGISTERED NURSE

## 2024-12-22 PROCEDURE — 97165 OT EVAL LOW COMPLEX 30 MIN: CPT | Mod: GO

## 2024-12-22 PROCEDURE — 97161 PT EVAL LOW COMPLEX 20 MIN: CPT | Mod: GP

## 2024-12-22 PROCEDURE — 2500000002 HC RX 250 W HCPCS SELF ADMINISTERED DRUGS (ALT 637 FOR MEDICARE OP, ALT 636 FOR OP/ED): Performed by: REGISTERED NURSE

## 2024-12-22 PROCEDURE — 99232 SBSQ HOSP IP/OBS MODERATE 35: CPT | Performed by: INTERNAL MEDICINE

## 2024-12-22 PROCEDURE — 2500000004 HC RX 250 GENERAL PHARMACY W/ HCPCS (ALT 636 FOR OP/ED): Performed by: REGISTERED NURSE

## 2024-12-22 PROCEDURE — 2500000004 HC RX 250 GENERAL PHARMACY W/ HCPCS (ALT 636 FOR OP/ED): Mod: JZ | Performed by: ORTHOPAEDIC SURGERY

## 2024-12-22 PROCEDURE — 80048 BASIC METABOLIC PNL TOTAL CA: CPT | Performed by: INTERNAL MEDICINE

## 2024-12-22 PROCEDURE — 99233 SBSQ HOSP IP/OBS HIGH 50: CPT | Performed by: INTERNAL MEDICINE

## 2024-12-22 PROCEDURE — 1100000001 HC PRIVATE ROOM DAILY

## 2024-12-22 PROCEDURE — 36415 COLL VENOUS BLD VENIPUNCTURE: CPT | Performed by: INTERNAL MEDICINE

## 2024-12-22 RX ADMIN — ESCITALOPRAM OXALATE 5 MG: 10 TABLET ORAL at 09:15

## 2024-12-22 RX ADMIN — TRAMADOL HYDROCHLORIDE 25 MG: 50 TABLET, COATED ORAL at 12:07

## 2024-12-22 RX ADMIN — BISACODYL 10 MG: 5 TABLET, COATED ORAL at 18:38

## 2024-12-22 RX ADMIN — APIXABAN 2.5 MG: 2.5 TABLET, FILM COATED ORAL at 09:14

## 2024-12-22 RX ADMIN — SENNOSIDES AND DOCUSATE SODIUM 2 TABLET: 50; 8.6 TABLET ORAL at 20:39

## 2024-12-22 RX ADMIN — ACETAMINOPHEN 975 MG: 325 TABLET, FILM COATED ORAL at 15:58

## 2024-12-22 RX ADMIN — CEFAZOLIN SODIUM 2 G: 2 INJECTION, SOLUTION INTRAVENOUS at 05:38

## 2024-12-22 RX ADMIN — AMLODIPINE BESYLATE 5 MG: 5 TABLET ORAL at 09:14

## 2024-12-22 RX ADMIN — POLYETHYLENE GLYCOL 3350 17 G: 17 POWDER, FOR SOLUTION ORAL at 09:14

## 2024-12-22 RX ADMIN — APIXABAN 2.5 MG: 2.5 TABLET, FILM COATED ORAL at 20:38

## 2024-12-22 RX ADMIN — Medication 2000 UNITS: at 09:15

## 2024-12-22 RX ADMIN — LOSARTAN POTASSIUM 100 MG: 50 TABLET, FILM COATED ORAL at 09:14

## 2024-12-22 RX ADMIN — ACETAMINOPHEN 975 MG: 325 TABLET, FILM COATED ORAL at 23:46

## 2024-12-22 RX ADMIN — ACETAMINOPHEN 975 MG: 325 TABLET, FILM COATED ORAL at 05:38

## 2024-12-22 RX ADMIN — ROSUVASTATIN CALCIUM 5 MG: 10 TABLET, FILM COATED ORAL at 20:38

## 2024-12-22 RX ADMIN — OXYCODONE HYDROCHLORIDE 5 MG: 5 TABLET ORAL at 09:15

## 2024-12-22 ASSESSMENT — PAIN SCALES - GENERAL
PAINLEVEL_OUTOF10: 3
PAINLEVEL_OUTOF10: 8
PAINLEVEL_OUTOF10: 5 - MODERATE PAIN
PAINLEVEL_OUTOF10: 5 - MODERATE PAIN
PAINLEVEL_OUTOF10: 7

## 2024-12-22 ASSESSMENT — COGNITIVE AND FUNCTIONAL STATUS - GENERAL
WALKING IN HOSPITAL ROOM: TOTAL
MOVING FROM LYING ON BACK TO SITTING ON SIDE OF FLAT BED WITH BEDRAILS: TOTAL
HELP NEEDED FOR BATHING: TOTAL
MOBILITY SCORE: 6
STANDING UP FROM CHAIR USING ARMS: TOTAL
CLIMB 3 TO 5 STEPS WITH RAILING: TOTAL
TOILETING: TOTAL
DRESSING REGULAR LOWER BODY CLOTHING: TOTAL
DAILY ACTIVITIY SCORE: 12
TURNING FROM BACK TO SIDE WHILE IN FLAT BAD: TOTAL
MOVING TO AND FROM BED TO CHAIR: TOTAL
PERSONAL GROOMING: A LITTLE
DRESSING REGULAR UPPER BODY CLOTHING: A LOT

## 2024-12-22 ASSESSMENT — PAIN - FUNCTIONAL ASSESSMENT: PAIN_FUNCTIONAL_ASSESSMENT: 0-10

## 2024-12-22 NOTE — PROGRESS NOTES
"Subjective Data:  Patient did have her surgery yesterday-she has some hip pain  She is breathing \"so-so\"  No chest pain or palpitations  Generally weak but appetite is improving    Overnight Events:    None reported     Objective Data:  Last Recorded Vitals:  Vitals:    12/21/24 1458 12/21/24 1927 12/22/24 0336 12/22/24 0753   BP: 138/65 104/58 120/64 147/70   BP Location:  Left arm  Left arm   Patient Position:  Lying  Lying   Pulse:  88 81 63   Resp:  16  16   Temp: 36.7 °C (98.1 °F)  35.7 °C (96.3 °F) 36.1 °C (97 °F)   TempSrc:    Temporal   SpO2: 96% 93% 95% 96%   Weight:       Height:           Last Labs:  CBC - 12/22/2024:  6:39 AM  7.8 11.4 152    35.2      CMP - 12/22/2024:  6:39 AM  8.8 8.4 22 --- 1.5   _ 4.7 19 87      PTT - No results in last year.  1.3   14.2 _     TROPHS   Date/Time Value Ref Range Status   04/26/2023 04:14 PM 56 0 - 13 ng/L Final     Comment:     .  Less than 99th percentile of normal range cutoff-  Female and children under 18 years old <14 ng/L; Male <21 ng/L: Negative  Repeat testing should be performed if clinically indicated.   .  Female and children under 18 years old 14-50 ng/L; Male 21-50 ng/L:  Consistent with possible cardiac damage and possible increased clinical   risk. Serial measurements may help to assess extent of myocardial damage.   .  >50 ng/L: Consistent with cardiac damage, increased clinical risk and  myocardial infarction. Serial measurements may help assess extent of   myocardial damage.   .   NOTE: Children less than 1 year old may have higher baseline troponin   levels and results should be interpreted in conjunction with the overall   clinical context.   .  NOTE: Troponin I testing is performed using a different   testing methodology at [facility] than at other   [facility] hospitals. Direct result comparisons should only   be made within the same method.  RESULTS RB TO BAR RICH, 04/26/2023 17:20     04/26/2023 09:57 AM 36 0 - 13 ng/L Final "     Comment:     .  Less than 99th percentile of normal range cutoff-  Female and children under 18 years old <14 ng/L; Male <21 ng/L: Negative  Repeat testing should be performed if clinically indicated.   .  Female and children under 18 years old 14-50 ng/L; Male 21-50 ng/L:  Consistent with possible cardiac damage and possible increased clinical   risk. Serial measurements may help to assess extent of myocardial damage.   .  >50 ng/L: Consistent with cardiac damage, increased clinical risk and  myocardial infarction. Serial measurements may help assess extent of   myocardial damage.   .   NOTE: Children less than 1 year old may have higher baseline troponin   levels and results should be interpreted in conjunction with the overall   clinical context.   .  NOTE: Troponin I testing is performed using a different   testing methodology at [facility] than at other   [facility]. Direct result comparisons should only   be made within the same method.     04/22/2023 02:40 PM 23 0 - 13 ng/L Final     Comment:     .  Less than 99th percentile of normal range cutoff-  Female and children under 18 years old <14 ng/L; Male <21 ng/L: Negative  Repeat testing should be performed if clinically indicated.   .  Female and children under 18 years old 14-50 ng/L; Male 21-50 ng/L:  Consistent with possible cardiac damage and possible increased clinical   risk. Serial measurements may help to assess extent of myocardial damage.   .  >50 ng/L: Consistent with cardiac damage, increased clinical risk and  myocardial infarction. Serial measurements may help assess extent of   myocardial damage.   .   NOTE: Children less than 1 year old may have higher baseline troponin   levels and results should be interpreted in conjunction with the overall   clinical context.   .  NOTE: Troponin I testing is performed using a different   testing methodology at [facility] than at other   [facility]. Direct result  comparisons should only   be made within the same method.       BNP   Date/Time Value Ref Range Status   12/18/2024 07:15  0 - 99 pg/mL Final   04/26/2023 09:57  0 - 99 pg/mL Final     Comment:     .  <100 pg/mL - Heart failure unlikely  100-299 pg/mL - Intermediate probability of acute heart  .               failure exacerbation. Correlate with clinical  .               context and patient history.    >=300 pg/mL - Heart Failure likely. Correlate with clinical  .               context and patient history.  BNP testing is performed using different testing   methodology at [facility] than at other   system hospitals. Direct result comparisons should   only be made within the same method.     04/11/2019 09:56  0 - 99 pg/mL Final     Comment:     .  <100 pg/mL - Heart failure unlikely  100-299 pg/mL - Intermediate probability of acute heart  .               failure exacerbation. Correlate with clinical  .               context and patient history.    >=300 pg/mL - Heart Failure likely. Correlate with clinical  .               context and patient history.  BNP testing is performed using different testing   methodology at [facility] than at other   [facility] hospitals. Direct result comparisons should   only be made within the same method.        Last I/O:  I/O last 3 completed shifts:  In: 300 (5.5 mL/kg) [I.V.:100 (1.8 mL/kg); IV Piggyback:200]  Out: 1225 (22.5 mL/kg) [Urine:1125 (0.6 mL/kg/hr); Blood:100]  Weight: 54.4 kg     Past Cardiology Tests (Last 3 Years):  EKG:  No telemetry available  Echo:  Transthoracic Echo (TTE) Complete 05/21/2024    Ejection Fractions:  55 to 60%  Cath:  No results found for this or any previous visit from the past 1095 days.    Stress Test:  No results found for this or any previous visit from the past 1095 days.    Cardiac Imaging:  Chest x-ray with mild pulmonary vascular congestion      Inpatient Medications:  Scheduled medications    Medication Dose Route Frequency    acetaminophen  975 mg oral q8h    amLODIPine  5 mg oral Daily    [Held by provider] apixaban  2.5 mg oral BID    apixaban  2.5 mg oral q12h VICTOR HUGO    cholecalciferol  2,000 Units oral Daily    escitalopram  5 mg oral Daily    furosemide  40 mg intravenous q12h    losartan  100 mg oral Daily    polyethylene glycol  17 g oral Daily    potassium chloride CR  10 mEq oral BID    rosuvastatin  5 mg oral Nightly    sennosides-docusate sodium  2 tablet oral Nightly    [Held by provider] torsemide  30 mg oral Daily     PRN medications   Medication    bisacodyl    bisacodyl    cyclobenzaprine    guaiFENesin    ipratropium-albuteroL    magnesium hydroxide    melatonin    metoclopramide    Or    metoclopramide    ondansetron    ondansetron    Or    ondansetron    oxyCODONE    polyethylene glycol    traMADol     Continuous Medications   Medication Dose Last Rate    lactated Ringer's  50 mL/hr 50 mL/hr (12/21/24 9729)    oxygen  2 L/min         Physical Exam:  Constitutional/General: Frail, elderly female who is alert and oriented and answers questions appropriately.  She is mildly increased work of respiration.   Neck: No increased JVP,no carotid bruits.  Respiratory:  Lungs with diminished breath sounds.  No respiratory distress  Cardiovascular: Heart is irregular irregular rhythm with rate controlled with a 1/6 systolic ejection murmur at the right upper sternal border.  No gallops, or rubs, PMI nondisplaced, 2+ distal pulses  Chest:  No chest wall tenderness  GI:  Abdomen soft, nontender, nondistended, + BS, no organomegaly, no palpable masses, no rebound, guarding, or rigidity  Musculoskeletal: Decreased mobility of left lower extremity  Extremities: No pretibial pitting peripheral edema  Integument: Skin warm and dry, no rashes  Neurologic:  No focal deficits  Psychiatric:  Normal affect     Assessment/Plan   Ermelinda Benoit is a 89 y.o. female with history of permanent atrial  fibrillation on Eliquis, status post 2018 TAVR for aortic stenosis (Evolute 26), heart failure with preserved ejection action, hypertension, stroke, hyperlipidemia presenting with hip fracture and now needs open reduction internal fixation and cardiology was consulted for preop eval.  1.  Heart failure preserved ejection fraction-on admission her exam and chest x-ray and BNP were all consistent with mild volume overload.  In addition she admitted to some shortness of breath.  We stopped her IV fluids and gave her a dose of IV Lasix.  Still with signs and symptoms of CHF 12/20-diuresed more aggressively.  Signs symptoms of CHF appear improved.  DC IV Lasix and resume home dose of p.o. torsemide.  Note will DC the patient's current IV fluids to reduce risk of recurrent volume overload (patient did eat breakfast today)  2.  Hip fracture-postop day #1 status post open reduction internal fixation.  Advance activity per Ortho  3.  Status post TAVR-stable per exam and 5/2024 echo  4.  Hypertension-blood pressure better controlled after adding amlodipine  -Will follow with you postop as needed  Peripheral IV 12/18/24 20 G 3 cm Left;Proximal Forearm (Active)   Site Assessment Clean;Dry 12/20/24 0900   Dressing Status Clean;Dry 12/20/24 0900   Number of days: 2       Code Status:  Full Code          Jean Guardado DO

## 2024-12-22 NOTE — PROGRESS NOTES
Occupational Therapy    Evaluation    Patient Name: Ermelinda Benoit  MRN: [id number]  Department: [facility]  Room: [address] [place] [address]  Today's Date: 12/22/2024  Time Calculation  Start Time: 1143  Stop Time: 1201  Time Calculation (min): 18 min        Assessment:  End of Session Communication: Bedside nurse  End of Session Patient Position: Bed, 3 rail up     Plan:  Treatment Interventions: ADL retraining, Functional transfer training, Endurance training, Compensatory technique education  OT Frequency: 3 times per week  OT Discharge Recommendations: Moderate intensity level of continued care  OT - OK to Discharge: Yes (to next level of care when cleared by medical team)  Treatment Interventions: ADL retraining, Functional transfer training, Endurance training, Compensatory technique education    Subjective   Current Problem:  1. Fall, initial encounter  Case Request Operating Room: Hip Fracture ORIF w/ Nail Trochanteric    Case Request Operating Room: Hip Fracture ORIF w/ Nail Trochanteric      2. Closed nondisplaced intertrochanteric fracture of left femur, initial encounter  Case Request Operating Room: Hip Fracture ORIF w/ Nail Trochanteric    Case Request Operating Room: Hip Fracture ORIF w/ Nail Trochanteric    CANCELED: Case Request Operating Room: Hip Fracture ORIF w/ Nail Trochanteric    CANCELED: Case Request Operating Room: Hip Fracture ORIF w/ Nail Trochanteric        General:  General  Reason for Referral: impaired adl; pt. admitted s/p fall, at [facility], L hip fx, orif 12/21/24  Referred By: Juvenal  Co-Treatment: PT  Co-Treatment Reason: to maximize patient safety/abilities  Prior to Session Communication: Bedside nurse  Patient Position Received: Bed, 3 rail up, Alarm off, not on at start of session  General Comment: pt. agreeable to therapy intervention  Precautions:  Precautions Comment: falls, wbat LLE s/p orif, o2 3 L/min, spo2 97% pre session, high 80's during, returned to 90's post session, pt. sob with  activity    Vital Sign (Past 2hrs)                 Pain:  Pain Assessment  Pain Assessment:  (pt. c/o buttocks pain, unable to rate, with activity having LLE pain, RN notified at end of session of pt's request for pain medicine)    Objective   Cognition:  Overall Cognitive Status: Within Functional Limits           Home Living:  Home Living Comments: pt. lives at [facility] x [facility] years, mod i with adl, spongebathes, staff assist with actual showers, pt. ambulates via wh. walker, pt. able to toilet self/dress self, staff completes all iadl tasks, driving per daughter  Prior Function:     IADL History:     ADL:  ADL Comments: dependent for LB dress/bathe, max assist for UB dress/bathe, feeding/grooming with set up, dependent for toileting  Activity Tolerance:  Endurance: Decreased tolerance for upright activites  Bed Mobility/Transfers: Bed Mobility  Bed Mobility:  (dependent x 2 supine <> sit, with use of draw sheet)     Sitting Balance:  Static Sitting Balance  Static Sitting-Comment/Number of Minutes: pt. able to tolerate sitting eob x few minutes, able to maintain with cga, however provided with max assist to conserve energy  Sensation:  Sensation Comment: sensation intact  Strength:  Strength Comments: bue's at least 3/5    Outcome Measures:[facility] Daily Activity  Putting on and taking off regular lower body clothing: Total  Bathing (including washing, rinsing, drying): Total  Putting on and taking off regular upper body clothing: A lot  Toileting, which includes using toilet, bedpan or urinal: Total  Taking care of personal grooming such as brushing teeth: A little  Eating Meals: None  Daily Activity - Total Score: 12        Education Documentation  Precautions, taught by Malu Erazo OT at 12/22/2024  1:55 PM.  Learner: Patient  Readiness: Acceptance  Method: Explanation  Response: Verbalizes Understanding, Needs Reinforcement    ADL Training, taught by Malu Erazo OT at 12/22/2024  1:55 PM.  Learner:  Patient  Readiness: Acceptance  Method: Explanation  Response: Verbalizes Understanding, Needs Reinforcement         Goals:  Encounter Problems       Encounter Problems (Active)       OT Goals       Increase functional mobility and  functional transfers to min assist x 1 for bed/chair/toilet with dme prn   (Progressing)       Start:  12/22/24    Expected End:  01/05/25            increase bue ther ex/activity x 7-10 minutes and increase standing tolerance x 3-5 minutes with min assist x 1 to promote greater activity tolerance for assist with adl.   (Progressing)       Start:  12/22/24    Expected End:  01/05/25            Increase ub/lb dressing to min assist x 1 with dme prn  (Progressing)       Start:  12/22/24    Expected End:  01/05/25            Increase toileting to min assist x 1 with dme prn  (Progressing)       Start:  12/22/24    Expected End:  01/05/25            Increase grooming to min assist x 1 sitting eob (Progressing)       Start:  12/22/24    Expected End:  01/05/25

## 2024-12-22 NOTE — PROGRESS NOTES
"Ermelinda Benoit is a 89 y.o. female on day 4 of admission presenting with Fall, initial encounter.    Subjective   The patient reports of tolerable left hip pain.       Objective     Physical Exam  Inspection of the left hip reveals within normal notes postoperative swelling.  The incision dressing is clean, dry and intact.  The left lower extremity is neurovascularly intact.  Last Recorded Vitals  Blood pressure 120/64, pulse 81, temperature 35.7 °C (96.3 °F), resp. rate 16, height 1.575 m (5' 2\"), weight 54.4 kg (120 lb), SpO2 95%.  Intake/Output last 3 Shifts:  I/O last 3 completed shifts:  In: 300 (5.5 mL/kg) [I.V.:100 (1.8 mL/kg); IV Piggyback:200]  Out: 1225 (22.5 mL/kg) [Urine:1125 (0.6 mL/kg/hr); Blood:100]  Weight: 54.4 kg     Relevant Results                   No results found for this or any previous visit (from the past 24 hours).             Assessment/Plan   Assessment & Plan  Fall, initial encounter    Closed nondisplaced intertrochanteric fracture of left femur    The patient is postoperative day 1 from her left hip ORIF with a short TFN.  Today's labs are pending.  Overall, she appears to be stable.  Plan to begin physical therapy today weightbearing as tolerated on the left lower extremity.  Her Eliquis was restarted.             Myles Leggett MD      "

## 2024-12-22 NOTE — CARE PLAN
The patient's goals for the shift include sleep    The clinical goals for the shift include pt. will report a decrease in pain with PRN pain meds      Problem: Pain  Goal: Takes deep breaths with improved pain control throughout the shift  12/22/2024 1028 by Carol Ervin RN  Outcome: Progressing  12/22/2024 1026 by Carol Ervin RN  Outcome: Progressing  Goal: Turns in bed with improved pain control throughout the shift  12/22/2024 1028 by Carol Ervin RN  Outcome: Progressing  12/22/2024 1026 by Carol Ervin RN  Outcome: Progressing  Goal: Walks with improved pain control throughout the shift  12/22/2024 1028 by Carol Ervin RN  Outcome: Progressing  12/22/2024 1026 by Carol Ervin RN  Outcome: Progressing  Goal: Performs ADL's with improved pain control throughout shift  12/22/2024 1028 by Carol Ervin RN  Outcome: Progressing  12/22/2024 1026 by Carol Ervin RN  Outcome: Progressing  Goal: Participates in PT with improved pain control throughout the shift  12/22/2024 1028 by Carol Evrin RN  Outcome: Progressing  12/22/2024 1026 by Carol Ervin RN  Outcome: Progressing  Goal: Free from opioid side effects throughout the shift  12/22/2024 1028 by Carol Ervin RN  Outcome: Progressing  12/22/2024 1026 by Carol Ervin RN  Outcome: Progressing  Goal: Free from acute confusion related to pain meds throughout the shift  12/22/2024 1028 by Carol Ervin RN  Outcome: Progressing  12/22/2024 1026 by Carol Ervin RN  Outcome: Progressing     Problem: Skin  Goal: Participates in plan/prevention/treatment measures  12/22/2024 1028 by Carol Ervin RN  Outcome: Progressing  12/22/2024 1026 by Carol Ervin RN  Outcome: Progressing  Flowsheets (Taken 12/22/2024 1026)  Participates in plan/prevention/treatment measures: Elevate heels  Goal: Prevent/manage excess moisture  12/22/2024 1028 by Carol Ervin RN  Outcome:  Progressing  12/22/2024 1026 by Carol Ervin RN  Outcome: Progressing  Flowsheets (Taken 12/22/2024 1026)  Prevent/manage excess moisture:   Cleanse incontinence/protect with barrier cream   Moisturize dry skin  Goal: Prevent/minimize sheer/friction injuries  12/22/2024 1028 by Carol Ervin RN  Outcome: Progressing  12/22/2024 1026 by Carol Ervin RN  Outcome: Progressing  Flowsheets (Taken 12/22/2024 1026)  Prevent/minimize sheer/friction injuries: Use pull sheet  Goal: Promote/optimize nutrition  12/22/2024 1028 by Carol Ervin RN  Outcome: Progressing  12/22/2024 1026 by Carol Ervin RN  Outcome: Progressing  Flowsheets (Taken 12/22/2024 1026)  Promote/optimize nutrition: Consume > 50% meals/supplements  Goal: Promote skin healing  12/22/2024 1028 by Carol Ervin RN  Outcome: Progressing  12/22/2024 1026 by Carol Ervin RN  Outcome: Progressing  Flowsheets (Taken 12/22/2024 1026)  Promote skin healing: Assess skin/pad under line(s)/device(s)

## 2024-12-22 NOTE — PROGRESS NOTES
Physical Therapy    Physical Therapy Evaluation    Patient Name: Ermelinda Benoit  MRN: [id number]  Today's Date: 12/22/2024   Time Calculation  Start Time: 1142  Stop Time: 1202  Time Calculation (min): 20 min  717/717-A    Assessment/Plan   PT Assessment  PT Assessment Results: Decreased strength, Decreased endurance, Impaired balance, Decreased mobility, Decreased skin integrity, Orthopedic restrictions, Pain  Rehab Prognosis: Good  Evaluation/Treatment Tolerance: Patient limited by fatigue  Medical Staff Made Aware: Yes  End of Session Communication: Bedside nurse  Assessment Comment: Pt presents /c above impairments and decline from functional baseline s/p fall, sustained hip fx, now s/p repair. Pt will benefit from continued PT services at mod intensity to address above and maximize functional mobility.  End of Session Patient Position: Bed, 3 rail up, Alarm off, not on at start of session  IP OR SWING BED PT PLAN  Inpatient or Swing Bed: Inpatient  PT Plan  Treatment/Interventions: Bed mobility, Transfer training, Gait training, Balance training, Neuromuscular re-education, Strengthening, Endurance training, Therapeutic exercise, Therapeutic activity  PT Plan: Ongoing PT  PT Frequency: 5 times per week  PT Discharge Recommendations: Moderate intensity level of continued care  PT - OK to Discharge: Yes    Subjective     Current Problem:  1. Fall, initial encounter  Case Request Operating Room: Hip Fracture ORIF w/ Nail Trochanteric    Case Request Operating Room: Hip Fracture ORIF w/ Nail Trochanteric      2. Closed nondisplaced intertrochanteric fracture of left femur, initial encounter  Case Request Operating Room: Hip Fracture ORIF w/ Nail Trochanteric    Case Request Operating Room: Hip Fracture ORIF w/ Nail Trochanteric    CANCELED: Case Request Operating Room: Hip Fracture ORIF w/ Nail Trochanteric    CANCELED: Case Request Operating Room: Hip Fracture ORIF w/ Nail Trochanteric        Patient Active  Problem List   Diagnosis    Leg edema    Weakness    Closed displaced comminuted fracture of shaft of right humerus with routine healing    COPD (chronic obstructive pulmonary disease) (Multi)    Hypertension, essential    S/P TAVR (transcatheter aortic valve replacement)    Dyslipidemia    Vertigo    Chronic diastolic congestive heart failure    Dyspnea    Epistaxis    History of cerebrovascular accident    History of malignant neoplasm of breast    Influenza with pneumonia    Absence of right breast    Melena    Longstanding persistent atrial fibrillation (Multi)    Proximal humerus fracture    History of aortic valve replacement    Vasovagal syncope    Cerebrovascular accident (CVA) due to embolism of left posterior cerebral artery (Multi)    Aortic prosthetic valve regurgitation    Ambulatory dysfunction    Presence of artificial hip joint, right    Permanent atrial fibrillation (Multi)    Anemia    Atrioventricular block, complete (Multi)    Dizziness    On continuous oral anticoagulation    Peripheral vascular disease (CMS-HCC)    Pain of right hip    Urinary tract infection    Abnormal CXR    Nontoxic multinodular goiter    Nonrheumatic aortic (valve) stenosis    Non-pressure chronic ulcer of other part of right foot limited to breakdown of skin    Muscle weakness (generalized)    Pain in right upper arm    Insomnia, unspecified    Cerebral infarction, unspecified    Cardiomegaly    Constipation, unspecified    Diverticulosis of large intestine without perforation or abscess without bleeding    Fatigue due to depression    Current mild episode of major depressive disorder (CMS-HCC)    Abnormal bilirubin test    Weight gain    Alteration in skin integrity    Other fatigue    Hordeolum externum of left upper eyelid    Fall, initial encounter    Closed nondisplaced intertrochanteric fracture of left femur       General Visit Information:  General  Reason for Referral: PT eval and treat  Referred By:  Juvenal  Co-Treatment: OT  Co-Treatment Reason: possible two person assist, maximize functional mobility  Prior to Session Communication: Bedside nurse  Patient Position Received: Bed, 3 rail up, Alarm off, not on at start of session  General Comment: Pt presents from [facility] s/p fall /c sustained L hip fx. Pt now s/p L hip ORIF 12/21. WBAT. Pt cleared for therapy by nursing. Pt supine, agreeable.    Home Living:  Home Living  Home Living Comments: Pt reports being in [facility] x5 years. Mod I /c ADLs, sponge bathes. Facility for IADLs. Pt ambulates /c WW. Denies any other recent falls.      Precautions:  Precautions  Precautions Comment: falls, WBAT LLE s/p ORIF      Objective     Pain:  Pain Assessment  Pain Assessment: 0-10  0-10 (Numeric) Pain Score:  (no numeric stated, pt c/o LLE discomfort, requetsing pain meds end of session, nursing notified)    Cognition:  Cognition  Overall Cognitive Status: Within Functional Limits (Pt A&Ox3 /c increased time)    General Assessments:  General Observation  General Observation: activity orders:  lines:   skin integrity:   Activity Tolerance  Endurance: Decreased tolerance for upright activites  Sensation  Sensation Comment: denies n/t  Strength  Strength Comments: BLE grossly 3-/5           Dynamic Sitting Balance  Dynamic Sitting-Comments: F+       Functional Assessments:     Bed Mobility  Bed Mobility: Yes  Bed Mobility 1  Bed Mobility Comments 1: Supine<>Sit /c dependent x2. HOB max elevated, use of hodan pad. Pt attempts to assist by reaching towards side rail. Pt tolerates EOB sitting ~2min /c modAx1>CGA. Decreasing SpO2, pt reports dizziness. N/A to progress transfers.   SpO2 ~97% at rest, decreasing to ~87% /c conversation and/or mobility. Seated rest breaks to recover. HR ~60 throughout.           Outcome Measures:     [facility] Basic Mobility  Turning from your back to your side while in a flat bed without using bedrails: Total  Moving from lying on your back to sitting on the  side of a flat bed without using bedrails: Total  Moving to and from bed to chair (including a wheelchair): Total  Standing up from a chair using your arms (e.g. wheelchair or bedside chair): Total  To walk in hospital room: Total  Climbing 3-5 steps with railing: Total  Basic Mobility - Total Score: 6                Goals:  Encounter Problems       Encounter Problems (Active)       PT Problem       STG - Pt will transition supine <> sitting with minAx2 (Progressing)       Start:  12/22/24    Expected End:  01/05/25            STG - Pt will transfer STS with modAx2 (Progressing)       Start:  12/22/24    Expected End:  01/05/25            STG - Pt will amb >/=15' using WW with minAx2 (Progressing)       Start:  12/22/24    Expected End:  01/05/25            STG - Pt will maintain F+ standing balance to decrease risk of falls (Progressing)       Start:  12/22/24    Expected End:  01/05/25                 Education Documentation  Mobility Training, taught by Africa Bell, PT at 12/22/2024  1:33 PM.  Learner: Patient  Readiness: Acceptance  Method: Explanation  Response: Verbalizes Understanding, Needs Reinforcement    Education Comments  No comments found.

## 2024-12-23 LAB
ANION GAP SERPL CALC-SCNC: 13 MMOL/L (ref 10–20)
ANION GAP SERPL CALC-SCNC: 13 MMOL/L (ref 10–20)
BUN SERPL-MCNC: 46 MG/DL (ref 6–23)
BUN SERPL-MCNC: 48 MG/DL (ref 6–23)
CALCIUM SERPL-MCNC: 8.5 MG/DL (ref 8.6–10.3)
CALCIUM SERPL-MCNC: 8.6 MG/DL (ref 8.6–10.3)
CHLORIDE SERPL-SCNC: 96 MMOL/L (ref 98–107)
CHLORIDE SERPL-SCNC: 97 MMOL/L (ref 98–107)
CO2 SERPL-SCNC: 24 MMOL/L (ref 21–32)
CO2 SERPL-SCNC: 24 MMOL/L (ref 21–32)
CREAT SERPL-MCNC: 0.98 MG/DL (ref 0.5–1.05)
CREAT SERPL-MCNC: 1.24 MG/DL (ref 0.5–1.05)
EGFRCR SERPLBLD CKD-EPI 2021: 41 ML/MIN/1.73M*2
EGFRCR SERPLBLD CKD-EPI 2021: 55 ML/MIN/1.73M*2
ERYTHROCYTE [DISTWIDTH] IN BLOOD BY AUTOMATED COUNT: 12.2 % (ref 11.5–14.5)
GLUCOSE SERPL-MCNC: 132 MG/DL (ref 74–99)
GLUCOSE SERPL-MCNC: 77 MG/DL (ref 74–99)
HCT VFR BLD AUTO: 36.5 % (ref 36–46)
HGB BLD-MCNC: 11.7 G/DL (ref 12–16)
MCH RBC QN AUTO: 30.9 PG (ref 26–34)
MCHC RBC AUTO-ENTMCNC: 32.1 G/DL (ref 32–36)
MCV RBC AUTO: 96 FL (ref 80–100)
NRBC BLD-RTO: 0 /100 WBCS (ref 0–0)
PLATELET # BLD AUTO: 162 X10*3/UL (ref 150–450)
POTASSIUM SERPL-SCNC: 3.9 MMOL/L (ref 3.5–5.3)
POTASSIUM SERPL-SCNC: 4.3 MMOL/L (ref 3.5–5.3)
RBC # BLD AUTO: 3.79 X10*6/UL (ref 4–5.2)
SODIUM SERPL-SCNC: 129 MMOL/L (ref 136–145)
SODIUM SERPL-SCNC: 130 MMOL/L (ref 136–145)
WBC # BLD AUTO: 8.6 X10*3/UL (ref 4.4–11.3)

## 2024-12-23 PROCEDURE — 2500000001 HC RX 250 WO HCPCS SELF ADMINISTERED DRUGS (ALT 637 FOR MEDICARE OP): Performed by: REGISTERED NURSE

## 2024-12-23 PROCEDURE — 36415 COLL VENOUS BLD VENIPUNCTURE: CPT | Performed by: REGISTERED NURSE

## 2024-12-23 PROCEDURE — 80048 BASIC METABOLIC PNL TOTAL CA: CPT | Performed by: INTERNAL MEDICINE

## 2024-12-23 PROCEDURE — 2500000001 HC RX 250 WO HCPCS SELF ADMINISTERED DRUGS (ALT 637 FOR MEDICARE OP): Performed by: INTERNAL MEDICINE

## 2024-12-23 PROCEDURE — 2500000004 HC RX 250 GENERAL PHARMACY W/ HCPCS (ALT 636 FOR OP/ED): Performed by: INTERNAL MEDICINE

## 2024-12-23 PROCEDURE — 97535 SELF CARE MNGMENT TRAINING: CPT | Mod: CQ,GP

## 2024-12-23 PROCEDURE — 1100000001 HC PRIVATE ROOM DAILY

## 2024-12-23 PROCEDURE — 99232 SBSQ HOSP IP/OBS MODERATE 35: CPT | Performed by: INTERNAL MEDICINE

## 2024-12-23 PROCEDURE — 2500000001 HC RX 250 WO HCPCS SELF ADMINISTERED DRUGS (ALT 637 FOR MEDICARE OP)

## 2024-12-23 PROCEDURE — 85027 COMPLETE CBC AUTOMATED: CPT | Performed by: REGISTERED NURSE

## 2024-12-23 PROCEDURE — 97535 SELF CARE MNGMENT TRAINING: CPT | Mod: CO,GO

## 2024-12-23 PROCEDURE — 2500000004 HC RX 250 GENERAL PHARMACY W/ HCPCS (ALT 636 FOR OP/ED): Performed by: REGISTERED NURSE

## 2024-12-23 PROCEDURE — 2500000002 HC RX 250 W HCPCS SELF ADMINISTERED DRUGS (ALT 637 FOR MEDICARE OP, ALT 636 FOR OP/ED): Performed by: REGISTERED NURSE

## 2024-12-23 PROCEDURE — 36415 COLL VENOUS BLD VENIPUNCTURE: CPT | Performed by: INTERNAL MEDICINE

## 2024-12-23 PROCEDURE — 80048 BASIC METABOLIC PNL TOTAL CA: CPT | Performed by: REGISTERED NURSE

## 2024-12-23 RX ORDER — ESCITALOPRAM OXALATE 10 MG/1
10 TABLET ORAL DAILY
Qty: 90 TABLET | Refills: 3 | Status: SHIPPED | OUTPATIENT
Start: 2024-12-24

## 2024-12-23 RX ORDER — HYDROXYZINE HYDROCHLORIDE 25 MG/1
25 TABLET, FILM COATED ORAL EVERY 6 HOURS PRN
Qty: 30 TABLET | Refills: 0 | Status: SHIPPED | OUTPATIENT
Start: 2024-12-23

## 2024-12-23 RX ORDER — ESCITALOPRAM OXALATE 10 MG/1
10 TABLET ORAL DAILY
Status: DISCONTINUED | OUTPATIENT
Start: 2024-12-24 | End: 2024-12-27 | Stop reason: HOSPADM

## 2024-12-23 RX ORDER — HYDROXYZINE HYDROCHLORIDE 25 MG/1
25 TABLET, FILM COATED ORAL ONCE
Status: COMPLETED | OUTPATIENT
Start: 2024-12-23 | End: 2024-12-23

## 2024-12-23 RX ORDER — MIRTAZAPINE 7.5 MG/1
7.5 TABLET, FILM COATED ORAL NIGHTLY
Qty: 90 TABLET | Refills: 3 | Status: SHIPPED | OUTPATIENT
Start: 2024-12-23

## 2024-12-23 RX ORDER — MIRTAZAPINE 15 MG/1
7.5 TABLET, FILM COATED ORAL NIGHTLY
Status: DISCONTINUED | OUTPATIENT
Start: 2024-12-23 | End: 2024-12-27 | Stop reason: HOSPADM

## 2024-12-23 RX ORDER — CYCLOBENZAPRINE HCL 10 MG
10 TABLET ORAL 3 TIMES DAILY PRN
Qty: 30 TABLET | Refills: 0 | Status: SHIPPED | OUTPATIENT
Start: 2024-12-23 | End: 2024-12-27 | Stop reason: HOSPADM

## 2024-12-23 RX ORDER — TRAMADOL HYDROCHLORIDE 25 MG/1
25 TABLET, COATED ORAL EVERY 6 HOURS PRN
Qty: 20 TABLET | Refills: 0 | Status: SHIPPED | OUTPATIENT
Start: 2024-12-23

## 2024-12-23 RX ORDER — SODIUM CHLORIDE 9 MG/ML
75 INJECTION, SOLUTION INTRAVENOUS CONTINUOUS
Status: DISCONTINUED | OUTPATIENT
Start: 2024-12-23 | End: 2024-12-23

## 2024-12-23 RX ORDER — HYDROXYZINE HYDROCHLORIDE 25 MG/1
25 TABLET, FILM COATED ORAL EVERY 6 HOURS PRN
Status: DISCONTINUED | OUTPATIENT
Start: 2024-12-23 | End: 2024-12-27 | Stop reason: HOSPADM

## 2024-12-23 RX ADMIN — SODIUM CHLORIDE 75 ML/HR: 9 INJECTION, SOLUTION INTRAVENOUS at 15:08

## 2024-12-23 RX ADMIN — POLYETHYLENE GLYCOL 3350 17 G: 17 POWDER, FOR SOLUTION ORAL at 08:25

## 2024-12-23 RX ADMIN — HYDROXYZINE HYDROCHLORIDE 25 MG: 25 TABLET ORAL at 16:26

## 2024-12-23 RX ADMIN — TRAMADOL HYDROCHLORIDE 25 MG: 50 TABLET, COATED ORAL at 20:35

## 2024-12-23 RX ADMIN — Medication 2000 UNITS: at 08:25

## 2024-12-23 RX ADMIN — AMLODIPINE BESYLATE 5 MG: 5 TABLET ORAL at 08:24

## 2024-12-23 RX ADMIN — ESCITALOPRAM OXALATE 5 MG: 10 TABLET ORAL at 08:24

## 2024-12-23 RX ADMIN — SENNOSIDES AND DOCUSATE SODIUM 2 TABLET: 50; 8.6 TABLET ORAL at 20:34

## 2024-12-23 RX ADMIN — APIXABAN 2.5 MG: 2.5 TABLET, FILM COATED ORAL at 20:39

## 2024-12-23 RX ADMIN — LOSARTAN POTASSIUM 100 MG: 50 TABLET, FILM COATED ORAL at 08:24

## 2024-12-23 RX ADMIN — TORSEMIDE 30 MG: 20 TABLET ORAL at 08:25

## 2024-12-23 RX ADMIN — POTASSIUM CHLORIDE 10 MEQ: 750 TABLET, EXTENDED RELEASE ORAL at 08:24

## 2024-12-23 RX ADMIN — ACETAMINOPHEN 975 MG: 325 TABLET, FILM COATED ORAL at 23:03

## 2024-12-23 RX ADMIN — TRAMADOL HYDROCHLORIDE 25 MG: 50 TABLET, COATED ORAL at 08:25

## 2024-12-23 RX ADMIN — ROSUVASTATIN CALCIUM 5 MG: 10 TABLET, FILM COATED ORAL at 20:36

## 2024-12-23 RX ADMIN — ACETAMINOPHEN 975 MG: 325 TABLET, FILM COATED ORAL at 15:07

## 2024-12-23 RX ADMIN — APIXABAN 2.5 MG: 2.5 TABLET, FILM COATED ORAL at 08:24

## 2024-12-23 RX ADMIN — POTASSIUM CHLORIDE 10 MEQ: 750 TABLET, EXTENDED RELEASE ORAL at 20:34

## 2024-12-23 RX ADMIN — MIRTAZAPINE 7.5 MG: 15 TABLET, FILM COATED ORAL at 20:36

## 2024-12-23 RX ADMIN — ACETAMINOPHEN 975 MG: 325 TABLET, FILM COATED ORAL at 06:15

## 2024-12-23 ASSESSMENT — COGNITIVE AND FUNCTIONAL STATUS - GENERAL
DAILY ACTIVITIY SCORE: 19
MOBILITY SCORE: 14
MOBILITY SCORE: 9
DRESSING REGULAR LOWER BODY CLOTHING: TOTAL
MOVING FROM LYING ON BACK TO SITTING ON SIDE OF FLAT BED WITH BEDRAILS: A LOT
TOILETING: A LITTLE
TOILETING: A LITTLE
DRESSING REGULAR LOWER BODY CLOTHING: A LOT
TOILETING: TOTAL
MOVING FROM LYING ON BACK TO SITTING ON SIDE OF FLAT BED WITH BEDRAILS: A LITTLE
DAILY ACTIVITIY SCORE: 19
CLIMB 3 TO 5 STEPS WITH RAILING: TOTAL
HELP NEEDED FOR BATHING: A LITTLE
WALKING IN HOSPITAL ROOM: A LOT
DAILY ACTIVITIY SCORE: 12
CLIMB 3 TO 5 STEPS WITH RAILING: TOTAL
MOVING TO AND FROM BED TO CHAIR: A LITTLE
CLIMB 3 TO 5 STEPS WITH RAILING: TOTAL
HELP NEEDED FOR BATHING: A LITTLE
DRESSING REGULAR UPPER BODY CLOTHING: A LOT
MOBILITY SCORE: 14
HELP NEEDED FOR BATHING: A LOT
DRESSING REGULAR UPPER BODY CLOTHING: A LITTLE
MOVING FROM LYING ON BACK TO SITTING ON SIDE OF FLAT BED WITH BEDRAILS: A LITTLE
TURNING FROM BACK TO SIDE WHILE IN FLAT BAD: A LOT
DRESSING REGULAR UPPER BODY CLOTHING: A LITTLE
STANDING UP FROM CHAIR USING ARMS: A LOT
STANDING UP FROM CHAIR USING ARMS: A LOT
EATING MEALS: A LITTLE
WALKING IN HOSPITAL ROOM: A LOT
MOVING TO AND FROM BED TO CHAIR: A LITTLE
DRESSING REGULAR LOWER BODY CLOTHING: A LOT
TURNING FROM BACK TO SIDE WHILE IN FLAT BAD: A LITTLE
STANDING UP FROM CHAIR USING ARMS: A LOT
WALKING IN HOSPITAL ROOM: TOTAL
PERSONAL GROOMING: A LITTLE
MOVING TO AND FROM BED TO CHAIR: TOTAL
TURNING FROM BACK TO SIDE WHILE IN FLAT BAD: A LITTLE

## 2024-12-23 ASSESSMENT — PAIN SCALES - GENERAL
PAINLEVEL_OUTOF10: 7
PAINLEVEL_OUTOF10: 10 - WORST POSSIBLE PAIN

## 2024-12-23 ASSESSMENT — PAIN DESCRIPTION - DESCRIPTORS: DESCRIPTORS: ACHING

## 2024-12-23 ASSESSMENT — PAIN SCALES - PAIN ASSESSMENT IN ADVANCED DEMENTIA (PAINAD)
BODYLANGUAGE: TENSE, DISTRESSED PACING, FIDGETING
TOTALSCORE: 5
NEGVOCALIZATION: OCCASIONAL MOAN/GROAN, LOW SPEECH, NEGATIVE/DISAPPROVING QUALITY
BREATHING: OCCASIONAL LABORED BREATHING, SHORT PERIOD OF HYPERVENTILATION
CONSOLABILITY: DISTRACTED OR REASSURED BY VOICE/TOUCH
FACIALEXPRESSION: SAD, FRIGHTENED, FROWN

## 2024-12-23 ASSESSMENT — PAIN - FUNCTIONAL ASSESSMENT
PAIN_FUNCTIONAL_ASSESSMENT: 0-10
PAIN_FUNCTIONAL_ASSESSMENT: 0-10
PAIN_FUNCTIONAL_ASSESSMENT: WONG-BAKER FACES

## 2024-12-23 ASSESSMENT — PAIN SCALES - WONG BAKER: WONGBAKER_NUMERICALRESPONSE: HURTS WORST

## 2024-12-23 ASSESSMENT — PAIN DESCRIPTION - ORIENTATION: ORIENTATION: LEFT

## 2024-12-23 ASSESSMENT — PAIN DESCRIPTION - LOCATION: LOCATION: LEG

## 2024-12-23 ASSESSMENT — ACTIVITIES OF DAILY LIVING (ADL): HOME_MANAGEMENT_TIME_ENTRY: 15

## 2024-12-23 NOTE — PROGRESS NOTES
Ermelinda Benoit is a 89 y.o. female on day 5 of admission presenting with Fall, initial encounter.      Subjective   No events overnight. Patient seen and examined at bedside. No new complaints. She admits her appetite is poor.       Objective     Last Recorded Vitals  /58 (BP Location: Left arm, Patient Position: Lying)   Pulse 56   Temp 36.6 °C (97.9 °F) (Temporal)   Resp 16   Wt 54.4 kg (120 lb)   SpO2 98%   Intake/Output last 3 Shifts:    Intake/Output Summary (Last 24 hours) at 12/23/2024 1336  Last data filed at 12/23/2024 0600  Gross per 24 hour   Intake --   Output 675 ml   Net -675 ml       Admission Weight  Weight: 54.4 kg (120 lb) (12/18/24 2205)    Daily Weight  12/18/24 : 54.4 kg (120 lb)    Image Results  FL less than 1 hour  These images are not reportable by radiology and will not be interpreted   by  Radiologists.      Physical Exam  Constitutional:       General: She is awake. She is not in acute distress.     Appearance: Normal appearance. She is underweight. She is not toxic-appearing.      Interventions: Nasal cannula in place.   HENT:      Head: Normocephalic and atraumatic.      Nose: Nose normal. No rhinorrhea.      Mouth/Throat:      Mouth: Mucous membranes are moist.   Eyes:      General:         Right eye: No discharge.         Left eye: No discharge.      Conjunctiva/sclera: Conjunctivae normal.      Pupils: Pupils are equal, round, and reactive to light.   Cardiovascular:      Rate and Rhythm: Normal rate and regular rhythm.      Pulses: Normal pulses.           Dorsalis pedis pulses are 2+ on the right side and 2+ on the left side.   Pulmonary:      Effort: Pulmonary effort is normal. Tachypnea present. No respiratory distress.      Breath sounds: Examination of the right-lower field reveals decreased breath sounds. Examination of the left-lower field reveals decreased breath sounds. Decreased breath sounds present. No wheezing.   Abdominal:      General: Bowel sounds  are normal. There is no distension.      Palpations: Abdomen is soft.      Tenderness: There is no abdominal tenderness. There is no guarding.   Musculoskeletal:         General: Normal range of motion.      Cervical back: Normal range of motion and neck supple.      Right lower le+ Edema present.      Left lower leg: Edema present.      Comments: Left leg shortened and externally rotated.  Pain in left hip with palpation.  Pain with movement.  Able to wiggle toes bilateral.  Pulses palpable, dorsalis pedis bilateral feet.   Skin:     General: Skin is warm and dry.   Neurological:      General: No focal deficit present.      Mental Status: She is alert and oriented to person, place, and time. Mental status is at baseline.      Motor: No weakness.   Psychiatric:         Mood and Affect: Mood normal.         Behavior: Behavior normal.      Relevant Results               Assessment/Plan                  Assessment & Plan  Fall, initial encounter    Closed nondisplaced intertrochanteric fracture of left femur    Patient admitted today after mechanical fall while walking with her walker.  Patient reports she tripped and was unable to regain her balance.  Patient alert and oriented, denies LOC, or hitting her head.  CT scans negative in ED for acute abnormality.  X-ray of left hip showed intertrochanteric left proximal femoral fracture.  Orthopedic consult in place.  Due to patient's cardiac history cardiology consult placed for surgery clearance.  One-time dose of Toradol ordered for pain as patient's daughter states patient cannot tolerate narcotics and becomes difficult to manage when given.  Daughter is Alexus blankenship, she is POA and would like to be consulted/called when patient is getting ready for surgery.  Her phone number is [phone].     Patient admitted to Dr. Mckay Arita for further medical management.      # Intertrochanteric left proximal femoral fracture  # Mechanical fall  # Dehydration  #  Increased back/leg pain  -Consult to orthopedic surgery  -Cardiology consult placed for surgery clearance  -Type and screen/coag labs completed  -Chest x-ray ordered  -As needed antiemetics  -As needed analgesics  -EKG ordered, and as needed  -As needed oxygen >92%  -PT/OT/SW   -Fall precautions  -NPO diet, effective midnight for procedure.  -LR at 50 mL/HR  -admitted to inpatient   -q4 vitals   -morning labs, CBC, BMP     Chronic Conditions   # CVA  # COPD  # CHF  # A-fib on Eliquis  # Breast CA with lumpectomy  # Hypertension     Continue home medications as ordered   Full Code      #DVT Prophylaxis   Scd's as tolerated   DVT Prophylaxis on Eliquis, hold for procedure.     12/20: Patient is being diuresed to optimize for surgery. She diuresed 1.2L yesterday. Continue current management and reassess for surgical readiness tomorrow.     12/21: Patient diuresed ~2L in the past 24 hours. She is on room air. Lab work is stable. She is medically cleared for surgery today.    12/22: S/p ORIF left hip yesterday. PT/OT pending. Transitioned to PO Torsemide.     12/23: Patient hyponatremic. Suspect multifactorial. Start IVF and place on fluid restriction. Add remeron to increase solute intake. Anticipate discharge back to [facility] tomorrow.             Mckay Arita, DO

## 2024-12-23 NOTE — PROGRESS NOTES
Ermelinda Benoit is a 89 y.o. female on day 4 of admission presenting with Fall, initial encounter.      Subjective   Patient is status post ORIF. Patient seen and examined at bedside. Resting comfortably in bed. Her pain is controlled. She has not been out of bed yet.       Objective     Last Recorded Vitals  BP 96/54   Pulse 51   Temp 36.1 °C (97 °F)   Resp 16   Wt 54.4 kg (120 lb)   SpO2 97%   Intake/Output last 3 Shifts:    Intake/Output Summary (Last 24 hours) at 12/22/2024 2311  Last data filed at 12/22/2024 2000  Gross per 24 hour   Intake 1071.67 ml   Output 1025 ml   Net 46.67 ml       Admission Weight  Weight: 54.4 kg (120 lb) (12/18/24 2205)    Daily Weight  12/18/24 : 54.4 kg (120 lb)    Image Results  FL less than 1 hour  These images are not reportable by radiology and will not be interpreted   by  Radiologists.      Physical Exam  Constitutional:       General: She is awake. She is not in acute distress.     Appearance: Normal appearance. She is underweight. She is not toxic-appearing.      Interventions: Nasal cannula in place.   HENT:      Head: Normocephalic and atraumatic.      Nose: Nose normal. No rhinorrhea.      Mouth/Throat:      Mouth: Mucous membranes are moist.   Eyes:      General:         Right eye: No discharge.         Left eye: No discharge.      Conjunctiva/sclera: Conjunctivae normal.      Pupils: Pupils are equal, round, and reactive to light.   Cardiovascular:      Rate and Rhythm: Normal rate and regular rhythm.      Pulses: Normal pulses.           Dorsalis pedis pulses are 2+ on the right side and 2+ on the left side.   Pulmonary:      Effort: Pulmonary effort is normal. Tachypnea present. No respiratory distress.      Breath sounds: Examination of the right-lower field reveals decreased breath sounds. Examination of the left-lower field reveals decreased breath sounds. Decreased breath sounds present. No wheezing.   Abdominal:      General: Bowel sounds are normal.  There is no distension.      Palpations: Abdomen is soft.      Tenderness: There is no abdominal tenderness. There is no guarding.   Musculoskeletal:         General: Normal range of motion.      Cervical back: Normal range of motion and neck supple.      Right lower le+ Edema present.      Left lower leg: Edema present.      Comments: Left leg shortened and externally rotated.  Pain in left hip with palpation.  Pain with movement.  Able to wiggle toes bilateral.  Pulses palpable, dorsalis pedis bilateral feet.   Skin:     General: Skin is warm and dry.   Neurological:      General: No focal deficit present.      Mental Status: She is alert and oriented to person, place, and time. Mental status is at baseline.      Motor: No weakness.   Psychiatric:         Mood and Affect: Mood normal.         Behavior: Behavior normal.      Relevant Results               Assessment/Plan                  Assessment & Plan  Fall, initial encounter    Closed nondisplaced intertrochanteric fracture of left femur    Patient admitted today after mechanical fall while walking with her walker.  Patient reports she tripped and was unable to regain her balance.  Patient alert and oriented, denies LOC, or hitting her head.  CT scans negative in ED for acute abnormality.  X-ray of left hip showed intertrochanteric left proximal femoral fracture.  Orthopedic consult in place.  Due to patient's cardiac history cardiology consult placed for surgery clearance.  One-time dose of Toradol ordered for pain as patient's daughter states patient cannot tolerate narcotics and becomes difficult to manage when given.  Daughter is Alexus blankenship, she is POA and would like to be consulted/called when patient is getting ready for surgery.  Her phone number is [phone].     Patient admitted to Dr. Mckay Arita for further medical management.      # Intertrochanteric left proximal femoral fracture  # Mechanical fall  # Dehydration  # Increased  back/leg pain  -Consult to orthopedic surgery  -Cardiology consult placed for surgery clearance  -Type and screen/coag labs completed  -Chest x-ray ordered  -As needed antiemetics  -As needed analgesics  -EKG ordered, and as needed  -As needed oxygen >92%  -PT/OT/SW   -Fall precautions  -NPO diet, effective midnight for procedure.  -LR at 50 mL/HR  -admitted to inpatient   -q4 vitals   -morning labs, CBC, BMP     Chronic Conditions   # CVA  # COPD  # CHF  # A-fib on Eliquis  # Breast CA with lumpectomy  # Hypertension     Continue home medications as ordered   Full Code      #DVT Prophylaxis   Scd's as tolerated   DVT Prophylaxis on Eliquis, hold for procedure.     12/20: Patient is being diuresed to optimize for surgery. She diuresed 1.2L yesterday. Continue current management and reassess for surgical readiness tomorrow.     12/21: Patient diuresed ~2L in the past 24 hours. She is on room air. Lab work is stable. She is medically cleared for surgery today.    12/22: S/p ORIF left hip yesterday. PT/OT pending. Transitioned to PO Torsemide.               Mckay Arita, DO

## 2024-12-23 NOTE — CARE PLAN
Problem: Pain  Goal: Takes deep breaths with improved pain control throughout the shift  Outcome: Progressing   The patient's goals for the shift include sleep    The clinical goals for the shift include pain management

## 2024-12-23 NOTE — PROGRESS NOTES
Occupational Therapy    OT Treatment    Patient Name: Ermelinda Benoit  MRN: [id number]  Department: [facility]  Room: [address] [place] [address]  Today's Date: 12/23/2024  Time Calculation  Start Time: 1054  Stop Time: 1120  Time Calculation (min): 26 min        Assessment:  End of Session Patient Position: Bed, 3 rail up, Alarm off, not on at start of session     Plan:  Treatment Interventions: ADL retraining, Functional transfer training, Endurance training, Compensatory technique education  OT Frequency: 3 times per week  OT Discharge Recommendations: Moderate intensity level of continued care  OT - OK to Discharge: Yes (to next level of care when cleared by medical team)  Treatment Interventions: ADL retraining, Functional transfer training, Endurance training, Compensatory technique education    Subjective   Previous Visit Info:  OT Last Visit  OT Received On: 12/23/24  General:  General  Family/Caregiver Present: Yes (daughter present and supportive)  Co-Treatment: PT  Co-Treatment Reason: to maximize patient safety/abilities  Prior to Session Communication: Bedside nurse  Patient Position Received: Bed, 3 rail up, Alarm off, not on at start of session  General Comment: pt. agreeable to therapy intervention  Precautions:  LE Weight Bearing Status: Weight Bearing as Tolerated  Medical Precautions: Fall precautions, Oxygen therapy device and L/min  Precautions Comment: pt anxious throughout therapy session, emotional support provided            Pain:  Pain Assessment  Pain Assessment:  (c/o pain in L hip but unable to rate on pain scale. ice pack applied at end of therapy session)    Objective         Activities of Daily Living: LE Dressing  LE Dressing: Yes  Sock Level of Assistance: Dependent  LE Dressing Where Assessed: Bed level  Functional Standing Tolerance:  Time: 1:30 standing at FWW  Bed Mobility/Transfers: Bed Mobility  Bed Mobility: Yes  Bed Mobility 1  Bed Mobility 1: Supine to sitting, Sitting to supine  Level of  Assistance 1: Maximum assistance, +2    Transfers  Transfer: Yes  Transfer 1  Technique 1: Sit to stand, Stand to sit  Transfer Device 1: Walker  Transfer Level of Assistance 1: Maximum assistance, +2      Functional Mobility:  Functional Mobility  Functional Mobility Performed: No (unable to safely attempt)  Sitting Balance:  Static Sitting Balance  Static Sitting-Balance Support: Right upper extremity supported  Static Sitting-Level of Assistance: Maximum assistance  Static Sitting-Comment/Number of Minutes: pt heavily leaning to R side d/t pain in L hip. pt tolerated sitting EOB for ~10:00      Outcome Measures:[facility] Daily Activity  Putting on and taking off regular lower body clothing: Total  Bathing (including washing, rinsing, drying): A lot  Putting on and taking off regular upper body clothing: A lot  Toileting, which includes using toilet, bedpan or urinal: Total  Taking care of personal grooming such as brushing teeth: A little  Eating Meals: A little  Daily Activity - Total Score: 12        Education Documentation  Precautions, taught by THOMAS Baxter at 12/23/2024  3:01 PM.  Learner: Patient  Readiness: Acceptance  Method: Explanation  Response: Verbalizes Understanding, Needs Reinforcement    ADL Training, taught by THOMAS Baxter at 12/23/2024  3:01 PM.  Learner: Patient  Readiness: Acceptance  Method: Explanation  Response: Verbalizes Understanding, Needs Reinforcement    Education Comments  No comments found.            Goals:  Encounter Problems       Encounter Problems (Active)       OT Goals       Increase functional mobility and  functional transfers to min assist x 1 for bed/chair/toilet with dme prn   (Progressing)       Start:  12/22/24    Expected End:  01/05/25            increase bue ther ex/activity x 7-10 minutes and increase standing tolerance x 3-5 minutes with min assist x 1 to promote greater activity tolerance for assist with adl.   (Progressing)       Start:   12/22/24    Expected End:  01/05/25            Increase ub/lb dressing to min assist x 1 with dme prn  (Progressing)       Start:  12/22/24    Expected End:  01/05/25            Increase toileting to min assist x 1 with dme prn  (Progressing)       Start:  12/22/24    Expected End:  01/05/25            Increase grooming to min assist x 1 sitting eob (Progressing)       Start:  12/22/24    Expected End:  01/05/25

## 2024-12-23 NOTE — PROGRESS NOTES
"Ermelinda Benoit is a 89 y.o. female on day 5 of admission presenting with Fall, initial encounter.    Subjective   The patient reports of not being able to participate much in physical therapy yesterday due to pain.       Objective     Physical Exam  Inspection of the left lower extremity reveals the dressing is clean, dry and intact.  There is within normal limits postoperative swelling.  The left lower extremity is neurovascularly intact.  Last Recorded Vitals  Blood pressure 120/58, pulse 56, temperature 36.6 °C (97.9 °F), resp. rate 16, height 1.575 m (5' 2\"), weight 54.4 kg (120 lb), SpO2 98%.  Intake/Output last 3 Shifts:  I/O last 3 completed shifts:  In: 1071.7 (19.7 mL/kg) [I.V.:871.7 (16 mL/kg); IV Piggyback:200]  Out: 1175 (21.6 mL/kg) [Urine:1175 (0.6 mL/kg/hr)]  Weight: 54.4 kg     Relevant Results                     Results for orders placed or performed during the hospital encounter of 12/18/24 (from the past 24 hours)   CBC   Result Value Ref Range    WBC 8.6 4.4 - 11.3 x10*3/uL    nRBC 0.0 0.0 - 0.0 /100 WBCs    RBC 3.79 (L) 4.00 - 5.20 x10*6/uL    Hemoglobin 11.7 (L) 12.0 - 16.0 g/dL    Hematocrit 36.5 36.0 - 46.0 %    MCV 96 80 - 100 fL    MCH 30.9 26.0 - 34.0 pg    MCHC 32.1 32.0 - 36.0 g/dL    RDW 12.2 11.5 - 14.5 %    Platelets 162 150 - 450 x10*3/uL   Basic metabolic panel   Result Value Ref Range    Glucose 77 74 - 99 mg/dL    Sodium 130 (L) 136 - 145 mmol/L    Potassium 3.9 3.5 - 5.3 mmol/L    Chloride 97 (L) 98 - 107 mmol/L    Bicarbonate 24 21 - 32 mmol/L    Anion Gap 13 10 - 20 mmol/L    Urea Nitrogen 46 (H) 6 - 23 mg/dL    Creatinine 0.98 0.50 - 1.05 mg/dL    eGFR 55 (L) >60 mL/min/1.73m*2    Calcium 8.5 (L) 8.6 - 10.3 mg/dL              Assessment/Plan   Assessment & Plan  Fall, initial encounter    Closed nondisplaced intertrochanteric fracture of left femur    The patient is postoperative day 2 from her left hip ORIF with a short TFN.  Overall, she is stable.  Continue with " physical therapy and discharge planning plan for staple removal and repeat hip x-rays at 2 weeks postop.             Myles Leggett MD

## 2024-12-23 NOTE — PROGRESS NOTES
Physical Therapy    Physical Therapy Treatment    Patient Name: Ermelinda Benoit  MRN: [id number]  Department: [facility]  Room: [address] [place] [address]  Today's Date: 12/23/2024  Time Calculation  Start Time: 1031  Stop Time: 1101  Time Calculation (min): 30 min         Assessment/Plan         PT Plan  Treatment/Interventions: Bed mobility, Transfer training, Gait training, Balance training, Neuromuscular re-education, Strengthening, Endurance training, Therapeutic exercise, Therapeutic activity  PT Plan: Ongoing PT  PT Frequency: 5 times per week  PT Discharge Recommendations: Moderate intensity level of continued care  PT - OK to Discharge: Yes      General Visit Information:   PT  Visit  PT Received On: 12/23/24       Subjective                Objective   Pain: left hip-no rating                           Treatments:  Daughter observed    Therapeutic Exercise  Therapeutic Exercise Performed:  (supine to sit and sit to supine max a x 2   sat unsupported on edge of bed 15 minutes max assist to mod a x 1  leans right)  Performed;ble ap's, heelslides (passive) and assisted laqs x 10 reps           Transfers  Transfer:  (sit to stand max a x 2   stood with walker one minute min a x 2)         Outcome Measures:  [facility] Basic Mobility  Turning from your back to your side while in a flat bed without using bedrails: A lot  Moving from lying on your back to sitting on the side of a flat bed without using bedrails: A lot  Moving to and from bed to chair (including a wheelchair): Total  Standing up from a chair using your arms (e.g. wheelchair or bedside chair): A lot  To walk in hospital room: Total  Climbing 3-5 steps with railing: Total  Basic Mobility - Total Score: 9    Education Documentation  Precautions, taught by Africa Tee PTA at 12/23/2024 12:55 PM.  Learner: Patient  Readiness: Acceptance  Method: Demonstration  Response: Demonstrated Understanding    ADL Training, taught by Africa Tee PTA at 12/23/2024 12:55  PM.  Learner: Patient  Readiness: Acceptance  Method: Demonstration  Response: Demonstrated Understanding    Mobility Training, taught by Africa Tee PTA at 12/23/2024 12:55 PM.  Learner: Patient  Readiness: Acceptance  Method: Demonstration  Response: Demonstrated Understanding    Education Comments  No comments found.           Encounter Problems       Encounter Problems (Active)       PT Problem       STG - Pt will transition supine <> sitting with minAx2 (Progressing)       Start:  12/22/24    Expected End:  01/05/25            STG - Pt will transfer STS with modAx2 (Progressing)       Start:  12/22/24    Expected End:  01/05/25            STG - Pt will amb >/=15' using WW with minAx2 (Progressing)       Start:  12/22/24    Expected End:  01/05/25            STG - Pt will maintain F+ standing balance to decrease risk of falls (Progressing)       Start:  12/22/24    Expected End:  01/05/25

## 2024-12-24 LAB
ANION GAP SERPL CALC-SCNC: 13 MMOL/L (ref 10–20)
BUN SERPL-MCNC: 45 MG/DL (ref 6–23)
CALCIUM SERPL-MCNC: 8.6 MG/DL (ref 8.6–10.3)
CHLORIDE SERPL-SCNC: 100 MMOL/L (ref 98–107)
CO2 SERPL-SCNC: 26 MMOL/L (ref 21–32)
CREAT SERPL-MCNC: 1.06 MG/DL (ref 0.5–1.05)
EGFRCR SERPLBLD CKD-EPI 2021: 50 ML/MIN/1.73M*2
ERYTHROCYTE [DISTWIDTH] IN BLOOD BY AUTOMATED COUNT: 12.2 % (ref 11.5–14.5)
GLUCOSE SERPL-MCNC: 102 MG/DL (ref 74–99)
HCT VFR BLD AUTO: 32.2 % (ref 36–46)
HGB BLD-MCNC: 10.6 G/DL (ref 12–16)
MCH RBC QN AUTO: 30.7 PG (ref 26–34)
MCHC RBC AUTO-ENTMCNC: 32.9 G/DL (ref 32–36)
MCV RBC AUTO: 93 FL (ref 80–100)
NRBC BLD-RTO: 0 /100 WBCS (ref 0–0)
PLATELET # BLD AUTO: 174 X10*3/UL (ref 150–450)
POTASSIUM SERPL-SCNC: 4.3 MMOL/L (ref 3.5–5.3)
RBC # BLD AUTO: 3.45 X10*6/UL (ref 4–5.2)
SODIUM SERPL-SCNC: 135 MMOL/L (ref 136–145)
WBC # BLD AUTO: 7.4 X10*3/UL (ref 4.4–11.3)

## 2024-12-24 PROCEDURE — 2500000001 HC RX 250 WO HCPCS SELF ADMINISTERED DRUGS (ALT 637 FOR MEDICARE OP): Performed by: REGISTERED NURSE

## 2024-12-24 PROCEDURE — 80048 BASIC METABOLIC PNL TOTAL CA: CPT | Performed by: INTERNAL MEDICINE

## 2024-12-24 PROCEDURE — 2500000005 HC RX 250 GENERAL PHARMACY W/O HCPCS: Performed by: INTERNAL MEDICINE

## 2024-12-24 PROCEDURE — 85027 COMPLETE CBC AUTOMATED: CPT | Performed by: INTERNAL MEDICINE

## 2024-12-24 PROCEDURE — 1100000001 HC PRIVATE ROOM DAILY

## 2024-12-24 PROCEDURE — 2500000001 HC RX 250 WO HCPCS SELF ADMINISTERED DRUGS (ALT 637 FOR MEDICARE OP): Performed by: INTERNAL MEDICINE

## 2024-12-24 PROCEDURE — 36415 COLL VENOUS BLD VENIPUNCTURE: CPT | Performed by: INTERNAL MEDICINE

## 2024-12-24 PROCEDURE — 2500000002 HC RX 250 W HCPCS SELF ADMINISTERED DRUGS (ALT 637 FOR MEDICARE OP, ALT 636 FOR OP/ED): Performed by: REGISTERED NURSE

## 2024-12-24 PROCEDURE — 2500000004 HC RX 250 GENERAL PHARMACY W/ HCPCS (ALT 636 FOR OP/ED): Performed by: REGISTERED NURSE

## 2024-12-24 RX ORDER — LIDOCAINE 560 MG/1
2 PATCH PERCUTANEOUS; TOPICAL; TRANSDERMAL DAILY
Status: DISCONTINUED | OUTPATIENT
Start: 2024-12-24 | End: 2024-12-27 | Stop reason: HOSPADM

## 2024-12-24 RX ADMIN — AMLODIPINE BESYLATE 5 MG: 5 TABLET ORAL at 09:10

## 2024-12-24 RX ADMIN — SENNOSIDES AND DOCUSATE SODIUM 2 TABLET: 50; 8.6 TABLET ORAL at 21:02

## 2024-12-24 RX ADMIN — ACETAMINOPHEN 975 MG: 325 TABLET, FILM COATED ORAL at 15:17

## 2024-12-24 RX ADMIN — APIXABAN 2.5 MG: 2.5 TABLET, FILM COATED ORAL at 09:10

## 2024-12-24 RX ADMIN — TORSEMIDE 30 MG: 20 TABLET ORAL at 09:10

## 2024-12-24 RX ADMIN — ACETAMINOPHEN 975 MG: 325 TABLET, FILM COATED ORAL at 06:08

## 2024-12-24 RX ADMIN — POLYETHYLENE GLYCOL 3350 17 G: 17 POWDER, FOR SOLUTION ORAL at 09:10

## 2024-12-24 RX ADMIN — LIDOCAINE 4% 2 PATCH: 40 PATCH TOPICAL at 17:59

## 2024-12-24 RX ADMIN — APIXABAN 2.5 MG: 2.5 TABLET, FILM COATED ORAL at 21:02

## 2024-12-24 RX ADMIN — Medication 2000 UNITS: at 09:10

## 2024-12-24 RX ADMIN — ROSUVASTATIN CALCIUM 5 MG: 10 TABLET, FILM COATED ORAL at 21:02

## 2024-12-24 RX ADMIN — MIRTAZAPINE 7.5 MG: 15 TABLET, FILM COATED ORAL at 21:02

## 2024-12-24 RX ADMIN — ESCITALOPRAM OXALATE 10 MG: 10 TABLET ORAL at 09:10

## 2024-12-24 RX ADMIN — POTASSIUM CHLORIDE 10 MEQ: 750 TABLET, EXTENDED RELEASE ORAL at 09:10

## 2024-12-24 RX ADMIN — LOSARTAN POTASSIUM 100 MG: 50 TABLET, FILM COATED ORAL at 09:10

## 2024-12-24 RX ADMIN — POTASSIUM CHLORIDE 10 MEQ: 750 TABLET, EXTENDED RELEASE ORAL at 21:02

## 2024-12-24 RX ADMIN — TRAMADOL HYDROCHLORIDE 25 MG: 50 TABLET, COATED ORAL at 15:17

## 2024-12-24 ASSESSMENT — PAIN SCALES - PAIN ASSESSMENT IN ADVANCED DEMENTIA (PAINAD)
BREATHING: OCCASIONAL LABORED BREATHING, SHORT PERIOD OF HYPERVENTILATION
BODYLANGUAGE: RIGID, FISTS CLENCHED, KNEES UP, PUSHING/PULLING AWAY, STRIKES OUT
NEGVOCALIZATION: OCCASIONAL MOAN/GROAN, LOW SPEECH, NEGATIVE/DISAPPROVING QUALITY
TOTALSCORE: MEDICATION (SEE MAR)
CONSOLABILITY: NO NEED TO CONSOLE
FACIALEXPRESSION: FACIAL GRIMACING
TOTALSCORE: 6

## 2024-12-24 ASSESSMENT — PAIN SCALES - GENERAL
PAINLEVEL_OUTOF10: 0 - NO PAIN

## 2024-12-24 ASSESSMENT — PAIN - FUNCTIONAL ASSESSMENT: PAIN_FUNCTIONAL_ASSESSMENT: 0-10

## 2024-12-24 NOTE — PROGRESS NOTES
Ermelinda Benoit is a 89 y.o. female on day 6 of admission presenting with Fall, initial encounter.      Subjective   No events overnight. Patient seen and examined at bedside. No new complaints. She admits her appetite is poor.       Objective     Last Recorded Vitals  /71 (BP Location: Left arm, Patient Position: Lying)   Pulse 73   Temp 36.6 °C (97.9 °F) (Temporal)   Resp 15   Wt 54.4 kg (120 lb)   SpO2 95%   Intake/Output last 3 Shifts:    Intake/Output Summary (Last 24 hours) at 12/24/2024 1440  Last data filed at 12/23/2024 2158  Gross per 24 hour   Intake 491.25 ml   Output --   Net 491.25 ml       Admission Weight  Weight: 54.4 kg (120 lb) (12/18/24 2205)    Daily Weight  12/18/24 : 54.4 kg (120 lb)    Image Results  FL less than 1 hour  These images are not reportable by radiology and will not be interpreted   by  Radiologists.      Physical Exam  Constitutional:       General: She is awake. She is not in acute distress.     Appearance: Normal appearance. She is underweight. She is not toxic-appearing.      Interventions: Nasal cannula in place.   HENT:      Head: Normocephalic and atraumatic.      Nose: Nose normal. No rhinorrhea.      Mouth/Throat:      Mouth: Mucous membranes are moist.   Eyes:      General:         Right eye: No discharge.         Left eye: No discharge.      Conjunctiva/sclera: Conjunctivae normal.      Pupils: Pupils are equal, round, and reactive to light.   Cardiovascular:      Rate and Rhythm: Normal rate and regular rhythm.      Pulses: Normal pulses.           Dorsalis pedis pulses are 2+ on the right side and 2+ on the left side.   Pulmonary:      Effort: Pulmonary effort is normal. Tachypnea present. No respiratory distress.      Breath sounds: Examination of the right-lower field reveals decreased breath sounds. Examination of the left-lower field reveals decreased breath sounds. Decreased breath sounds present. No wheezing.   Abdominal:      General: Bowel  sounds are normal. There is no distension.      Palpations: Abdomen is soft.      Tenderness: There is no abdominal tenderness. There is no guarding.   Musculoskeletal:         General: Normal range of motion.      Cervical back: Normal range of motion and neck supple.      Right lower le+ Edema present.      Left lower leg: Edema present.      Comments: Left leg shortened and externally rotated.  Pain in left hip with palpation.  Pain with movement.  Able to wiggle toes bilateral.  Pulses palpable, dorsalis pedis bilateral feet.   Skin:     General: Skin is warm and dry.   Neurological:      General: No focal deficit present.      Mental Status: She is alert and oriented to person, place, and time. Mental status is at baseline.      Motor: No weakness.   Psychiatric:         Mood and Affect: Mood normal.         Behavior: Behavior normal.      Relevant Results               Assessment/Plan                  Assessment & Plan  Fall, initial encounter    Closed nondisplaced intertrochanteric fracture of left femur    Patient admitted today after mechanical fall while walking with her walker.  Patient reports she tripped and was unable to regain her balance.  Patient alert and oriented, denies LOC, or hitting her head.  CT scans negative in ED for acute abnormality.  X-ray of left hip showed intertrochanteric left proximal femoral fracture.  Orthopedic consult in place.  Due to patient's cardiac history cardiology consult placed for surgery clearance.  One-time dose of Toradol ordered for pain as patient's daughter states patient cannot tolerate narcotics and becomes difficult to manage when given.  Daughter is Alexus blankenship, she is POA and would like to be consulted/called when patient is getting ready for surgery.  Her phone number is [phone].     Patient admitted to Dr. Mckay Arita for further medical management.      # Intertrochanteric left proximal femoral fracture  # Mechanical fall  #  Dehydration  # Increased back/leg pain  -Consult to orthopedic surgery  -Cardiology consult placed for surgery clearance  -Type and screen/coag labs completed  -Chest x-ray ordered  -As needed antiemetics  -As needed analgesics  -EKG ordered, and as needed  -As needed oxygen >92%  -PT/OT/SW   -Fall precautions  -NPO diet, effective midnight for procedure.  -LR at 50 mL/HR  -admitted to inpatient   -q4 vitals   -morning labs, CBC, BMP     Chronic Conditions   # CVA  # COPD  # CHF  # A-fib on Eliquis  # Breast CA with lumpectomy  # Hypertension     Continue home medications as ordered   Full Code      #DVT Prophylaxis   Scd's as tolerated   DVT Prophylaxis on Eliquis, hold for procedure.     12/20: Patient is being diuresed to optimize for surgery. She diuresed 1.2L yesterday. Continue current management and reassess for surgical readiness tomorrow.     12/21: Patient diuresed ~2L in the past 24 hours. She is on room air. Lab work is stable. She is medically cleared for surgery today.    12/22: S/p ORIF left hip yesterday. PT/OT pending. Transitioned to PO Torsemide.     12/23: Patient hyponatremic. Suspect multifactorial. Start IVF and place on fluid restriction. Add remeron to increase solute intake. Anticipate discharge back to [place] tomorrow.      Patient fully evaluated  12/24  for    Problem List Items Addressed This Visit       * (Principal) Fall, initial encounter - Primary    Relevant Orders    Case Request Operating Room: Hip Fracture ORIF w/ Nail Trochanteric (Completed)    Closed nondisplaced intertrochanteric fracture of left femur    Relevant Medications    escitalopram (Lexapro) 10 mg tablet    cyclobenzaprine (Flexeril) 10 mg tablet    mirtazapine (Remeron) 7.5 mg tablet    traMADol 25 mg tablet    Other Relevant Orders    Case Request Operating Room: Hip Fracture ORIF w/ Nail Trochanteric (Completed)     Other Visit Diagnoses       Anxiety        Relevant Medications    hydrOXYzine HCL (Atarax) 25 mg  tablet          Patient seen resting in bed with head of bed elevated, no s/s or c/o acute difficulties at this time.  Vital signs for last 24 hours Temp:  [36 °C (96.8 °F)-37.5 °C (99.5 °F)] 36.6 °C (97.9 °F)  Heart Rate:  [73-83] 73  Resp:  [15-16] 15  BP: (111-160)/(56-71) 160/71    No intake/output data recorded.  Patient still requiring frequent cardiac and SPO2 monitoring. Continue aggressive pulmonary hygiene and oral hygiene. Off loading as tolerated for skin integrity. Medications and labs reviewed-   Results for orders placed or performed during the hospital encounter of 12/18/24 (from the past 24 hours)   Basic metabolic panel   Result Value Ref Range    Glucose 102 (H) 74 - 99 mg/dL    Sodium 135 (L) 136 - 145 mmol/L    Potassium 4.3 3.5 - 5.3 mmol/L    Chloride 100 98 - 107 mmol/L    Bicarbonate 26 21 - 32 mmol/L    Anion Gap 13 10 - 20 mmol/L    Urea Nitrogen 45 (H) 6 - 23 mg/dL    Creatinine 1.06 (H) 0.50 - 1.05 mg/dL    eGFR 50 (L) >60 mL/min/1.73m*2    Calcium 8.6 8.6 - 10.3 mg/dL   CBC   Result Value Ref Range    WBC 7.4 4.4 - 11.3 x10*3/uL    nRBC 0.0 0.0 - 0.0 /100 WBCs    RBC 3.45 (L) 4.00 - 5.20 x10*6/uL    Hemoglobin 10.6 (L) 12.0 - 16.0 g/dL    Hematocrit 32.2 (L) 36.0 - 46.0 %    MCV 93 80 - 100 fL    MCH 30.7 26.0 - 34.0 pg    MCHC 32.9 32.0 - 36.0 g/dL    RDW 12.2 11.5 - 14.5 %    Platelets 174 150 - 450 x10*3/uL      Patient recently received an antibiotic (last 12 hours)       None           Plan discussed with interdisciplinary team, per orthopedics today - The patient continues to report of difficulty with mobilization secondary to left hip pain. The patient is postoperative day 3 from her left hip ORIF.  Overall, she is stable.  Continue with the Eliquis for DVT prophylaxis.  Plan for staple removal and hip x-rays at 2 weeks postop. Appreciate input and agree with plan. Will continue current and repeat labs in the AM. Lidocaine patch ordered for discomfort and multimodal pain relief.  Patient with slow but progressive improvement noted, returning to baseline. Hemoglobin down from 11.7 to 10.6 today. No active bleeding noted. Will monitor overnight and repeat labs in the AM.        Discharge planning discussed with patient and care team. Therapy evaluations ordered. [facility]- 9. Per [facility]- Plan for patient to return to [facility] [place] when medically ready. They have intensive therapy program where she will get 1 hour of therapy a day.  Patient aware and agreeable to current plan, continue plan as above.     I spent a total of 50 minutes on the date of the service which included preparing to see the patient, face-to-face patient care, completing clinical documentation, obtaining and/or reviewing separately obtained history, performing a medically appropriate examination, counseling and educating the patient/family/caregiver, ordering medications, tests, or procedures, communicating with other HCPs (not separately reported), independently interpreting results (not separately reported), communicating results to the patient/family/caregiver, and care coordination (not separately reported).        Malu Moreno

## 2024-12-24 NOTE — PROGRESS NOTES
"Ermelinda Benoit is a 89 y.o. female on day 6 of admission presenting with Fall, initial encounter.    Subjective   The patient continues to report of difficulty with mobilization secondary to left hip pain.       Objective     Physical Exam  Inspection of the left lower extremity reveals within normal limits postoperative swelling.  The hip dressing is clean, dry and intact.  The calf is soft and nontender.  The left lower extremity is neurovascular intact.  Last Recorded Vitals  Blood pressure 125/60, pulse 83, temperature 36.2 °C (97.2 °F), temperature source Temporal, resp. rate 15, height 1.575 m (5' 2\"), weight 54.4 kg (120 lb), SpO2 92%.  Intake/Output last 3 Shifts:  I/O last 3 completed shifts:  In: 491.3 (9 mL/kg) [I.V.:491.3 (9 mL/kg)]  Out: 350 (6.4 mL/kg) [Urine:350 (0.2 mL/kg/hr)]  Weight: 54.4 kg     Relevant Results               Results for orders placed or performed during the hospital encounter of 12/18/24 (from the past 24 hours)   Basic metabolic panel   Result Value Ref Range    Glucose 132 (H) 74 - 99 mg/dL    Sodium 129 (L) 136 - 145 mmol/L    Potassium 4.3 3.5 - 5.3 mmol/L    Chloride 96 (L) 98 - 107 mmol/L    Bicarbonate 24 21 - 32 mmol/L    Anion Gap 13 10 - 20 mmol/L    Urea Nitrogen 48 (H) 6 - 23 mg/dL    Creatinine 1.24 (H) 0.50 - 1.05 mg/dL    eGFR 41 (L) >60 mL/min/1.73m*2    Calcium 8.6 8.6 - 10.3 mg/dL   Basic metabolic panel   Result Value Ref Range    Glucose 102 (H) 74 - 99 mg/dL    Sodium 135 (L) 136 - 145 mmol/L    Potassium 4.3 3.5 - 5.3 mmol/L    Chloride 100 98 - 107 mmol/L    Bicarbonate 26 21 - 32 mmol/L    Anion Gap 13 10 - 20 mmol/L    Urea Nitrogen 45 (H) 6 - 23 mg/dL    Creatinine 1.06 (H) 0.50 - 1.05 mg/dL    eGFR 50 (L) >60 mL/min/1.73m*2    Calcium 8.6 8.6 - 10.3 mg/dL   CBC   Result Value Ref Range    WBC 7.4 4.4 - 11.3 x10*3/uL    nRBC 0.0 0.0 - 0.0 /100 WBCs    RBC 3.45 (L) 4.00 - 5.20 x10*6/uL    Hemoglobin 10.6 (L) 12.0 - 16.0 g/dL    Hematocrit 32.2 (L) 36.0 " - 46.0 %    MCV 93 80 - 100 fL    MCH 30.7 26.0 - 34.0 pg    MCHC 32.9 32.0 - 36.0 g/dL    RDW 12.2 11.5 - 14.5 %    Platelets 174 150 - 450 x10*3/uL                     Assessment/Plan   Assessment & Plan  Fall, initial encounter    Closed nondisplaced intertrochanteric fracture of left femur    The patient is postoperative day 3 from her left hip ORIF.  Overall, she is stable.  Continue with the Eliquis for DVT prophylaxis.  Plan for staple removal and hip x-rays at 2 weeks postop.             Myles Leggett MD

## 2024-12-24 NOTE — PROGRESS NOTES
Plan for patient to return to [facility] in [place] when medically ready. They have intensive therapy program where she will get 1 hour of therapy a day.  Provider stating one more day.

## 2024-12-24 NOTE — CARE PLAN
The patient's goals for the shift include awareness of space    The clinical goals for the shift include pain management      Problem: Pain  Goal: Takes deep breaths with improved pain control throughout the shift  Outcome: Progressing  Goal: Turns in bed with improved pain control throughout the shift  Outcome: Progressing  Goal: Walks with improved pain control throughout the shift  Outcome: Progressing  Goal: Performs ADL's with improved pain control throughout shift  Outcome: Progressing  Goal: Participates in PT with improved pain control throughout the shift  Outcome: Progressing  Goal: Free from opioid side effects throughout the shift  Outcome: Progressing  Goal: Free from acute confusion related to pain meds throughout the shift  Outcome: Progressing     Problem: Skin  Goal: Participates in plan/prevention/treatment measures  Outcome: Progressing  Flowsheets (Taken 12/23/2024 2021)  Participates in plan/prevention/treatment measures: Elevate heels  Goal: Prevent/manage excess moisture  Outcome: Progressing  Flowsheets (Taken 12/23/2024 2021)  Prevent/manage excess moisture:   Cleanse incontinence/protect with barrier cream   Moisturize dry skin  Goal: Prevent/minimize sheer/friction injuries  Outcome: Progressing  Flowsheets (Taken 12/23/2024 2021)  Prevent/minimize sheer/friction injuries: Use pull sheet  Goal: Promote/optimize nutrition  Outcome: Progressing  Flowsheets (Taken 12/23/2024 2021)  Promote/optimize nutrition: Monitor/record intake including meals  Goal: Promote skin healing  Outcome: Progressing  Flowsheets (Taken 12/23/2024 2021)  Promote skin healing: Assess skin/pad under line(s)/device(s)

## 2024-12-25 LAB
ALBUMIN SERPL BCP-MCNC: 3.1 G/DL (ref 3.4–5)
ALP SERPL-CCNC: 58 U/L (ref 33–136)
ALT SERPL W P-5'-P-CCNC: 3 U/L (ref 7–45)
ANION GAP SERPL CALC-SCNC: 13 MMOL/L (ref 10–20)
AST SERPL W P-5'-P-CCNC: 17 U/L (ref 9–39)
BILIRUB SERPL-MCNC: 1.1 MG/DL (ref 0–1.2)
BUN SERPL-MCNC: 42 MG/DL (ref 6–23)
CALCIUM SERPL-MCNC: 8.7 MG/DL (ref 8.6–10.3)
CHLORIDE SERPL-SCNC: 103 MMOL/L (ref 98–107)
CO2 SERPL-SCNC: 28 MMOL/L (ref 21–32)
CREAT SERPL-MCNC: 1.16 MG/DL (ref 0.5–1.05)
EGFRCR SERPLBLD CKD-EPI 2021: 45 ML/MIN/1.73M*2
ERYTHROCYTE [DISTWIDTH] IN BLOOD BY AUTOMATED COUNT: 12.3 % (ref 11.5–14.5)
GLUCOSE SERPL-MCNC: 68 MG/DL (ref 74–99)
HCT VFR BLD AUTO: 31 % (ref 36–46)
HGB BLD-MCNC: 10.3 G/DL (ref 12–16)
MCH RBC QN AUTO: 31.8 PG (ref 26–34)
MCHC RBC AUTO-ENTMCNC: 33.2 G/DL (ref 32–36)
MCV RBC AUTO: 96 FL (ref 80–100)
NRBC BLD-RTO: 0 /100 WBCS (ref 0–0)
PLATELET # BLD AUTO: 173 X10*3/UL (ref 150–450)
POTASSIUM SERPL-SCNC: 4.5 MMOL/L (ref 3.5–5.3)
PROT SERPL-MCNC: 5.9 G/DL (ref 6.4–8.2)
RBC # BLD AUTO: 3.24 X10*6/UL (ref 4–5.2)
SODIUM SERPL-SCNC: 139 MMOL/L (ref 136–145)
WBC # BLD AUTO: 6.4 X10*3/UL (ref 4.4–11.3)

## 2024-12-25 PROCEDURE — 2500000005 HC RX 250 GENERAL PHARMACY W/O HCPCS: Performed by: INTERNAL MEDICINE

## 2024-12-25 PROCEDURE — 2500000001 HC RX 250 WO HCPCS SELF ADMINISTERED DRUGS (ALT 637 FOR MEDICARE OP): Performed by: INTERNAL MEDICINE

## 2024-12-25 PROCEDURE — 2500000001 HC RX 250 WO HCPCS SELF ADMINISTERED DRUGS (ALT 637 FOR MEDICARE OP): Performed by: REGISTERED NURSE

## 2024-12-25 PROCEDURE — 2500000004 HC RX 250 GENERAL PHARMACY W/ HCPCS (ALT 636 FOR OP/ED): Performed by: REGISTERED NURSE

## 2024-12-25 PROCEDURE — 36415 COLL VENOUS BLD VENIPUNCTURE: CPT | Performed by: INTERNAL MEDICINE

## 2024-12-25 PROCEDURE — 1100000001 HC PRIVATE ROOM DAILY

## 2024-12-25 PROCEDURE — 85027 COMPLETE CBC AUTOMATED: CPT | Performed by: INTERNAL MEDICINE

## 2024-12-25 PROCEDURE — 80053 COMPREHEN METABOLIC PANEL: CPT | Performed by: INTERNAL MEDICINE

## 2024-12-25 PROCEDURE — 2500000002 HC RX 250 W HCPCS SELF ADMINISTERED DRUGS (ALT 637 FOR MEDICARE OP, ALT 636 FOR OP/ED): Performed by: REGISTERED NURSE

## 2024-12-25 RX ADMIN — SENNOSIDES AND DOCUSATE SODIUM 2 TABLET: 50; 8.6 TABLET ORAL at 21:17

## 2024-12-25 RX ADMIN — POTASSIUM CHLORIDE 10 MEQ: 750 TABLET, EXTENDED RELEASE ORAL at 08:41

## 2024-12-25 RX ADMIN — POTASSIUM CHLORIDE 10 MEQ: 750 TABLET, EXTENDED RELEASE ORAL at 21:17

## 2024-12-25 RX ADMIN — LOSARTAN POTASSIUM 100 MG: 50 TABLET, FILM COATED ORAL at 08:40

## 2024-12-25 RX ADMIN — LIDOCAINE 4% 2 PATCH: 40 PATCH TOPICAL at 08:40

## 2024-12-25 RX ADMIN — ACETAMINOPHEN 975 MG: 325 TABLET, FILM COATED ORAL at 00:54

## 2024-12-25 RX ADMIN — ESCITALOPRAM OXALATE 10 MG: 10 TABLET ORAL at 08:41

## 2024-12-25 RX ADMIN — ACETAMINOPHEN 975 MG: 325 TABLET, FILM COATED ORAL at 08:41

## 2024-12-25 RX ADMIN — Medication 2000 UNITS: at 08:41

## 2024-12-25 RX ADMIN — TORSEMIDE 30 MG: 20 TABLET ORAL at 08:40

## 2024-12-25 RX ADMIN — HYDROXYZINE HYDROCHLORIDE 25 MG: 25 TABLET ORAL at 12:54

## 2024-12-25 RX ADMIN — AMLODIPINE BESYLATE 5 MG: 5 TABLET ORAL at 08:40

## 2024-12-25 RX ADMIN — MIRTAZAPINE 7.5 MG: 15 TABLET, FILM COATED ORAL at 21:17

## 2024-12-25 RX ADMIN — APIXABAN 2.5 MG: 2.5 TABLET, FILM COATED ORAL at 21:17

## 2024-12-25 RX ADMIN — APIXABAN 2.5 MG: 2.5 TABLET, FILM COATED ORAL at 08:41

## 2024-12-25 RX ADMIN — OXYCODONE HYDROCHLORIDE 5 MG: 5 TABLET ORAL at 21:22

## 2024-12-25 RX ADMIN — POLYETHYLENE GLYCOL 3350 17 G: 17 POWDER, FOR SOLUTION ORAL at 08:40

## 2024-12-25 RX ADMIN — ACETAMINOPHEN 975 MG: 325 TABLET, FILM COATED ORAL at 16:27

## 2024-12-25 RX ADMIN — ROSUVASTATIN CALCIUM 5 MG: 10 TABLET, FILM COATED ORAL at 21:17

## 2024-12-25 ASSESSMENT — COGNITIVE AND FUNCTIONAL STATUS - GENERAL
TOILETING: A LITTLE
WALKING IN HOSPITAL ROOM: A LOT
MOVING TO AND FROM BED TO CHAIR: A LITTLE
DAILY ACTIVITIY SCORE: 19
DRESSING REGULAR UPPER BODY CLOTHING: A LITTLE
CLIMB 3 TO 5 STEPS WITH RAILING: TOTAL
TOILETING: A LITTLE
MOBILITY SCORE: 14
DRESSING REGULAR UPPER BODY CLOTHING: A LITTLE
MOVING FROM LYING ON BACK TO SITTING ON SIDE OF FLAT BED WITH BEDRAILS: A LITTLE
WALKING IN HOSPITAL ROOM: A LOT
TURNING FROM BACK TO SIDE WHILE IN FLAT BAD: A LITTLE
HELP NEEDED FOR BATHING: A LITTLE
DRESSING REGULAR LOWER BODY CLOTHING: A LOT
CLIMB 3 TO 5 STEPS WITH RAILING: TOTAL
MOVING FROM LYING ON BACK TO SITTING ON SIDE OF FLAT BED WITH BEDRAILS: A LITTLE
TURNING FROM BACK TO SIDE WHILE IN FLAT BAD: A LITTLE
HELP NEEDED FOR BATHING: A LITTLE
STANDING UP FROM CHAIR USING ARMS: A LOT
DAILY ACTIVITIY SCORE: 19
MOBILITY SCORE: 14
STANDING UP FROM CHAIR USING ARMS: A LOT
MOVING TO AND FROM BED TO CHAIR: A LITTLE
DRESSING REGULAR LOWER BODY CLOTHING: A LOT

## 2024-12-25 ASSESSMENT — PAIN - FUNCTIONAL ASSESSMENT
PAIN_FUNCTIONAL_ASSESSMENT: 0-10
PAIN_FUNCTIONAL_ASSESSMENT: 0-10

## 2024-12-25 ASSESSMENT — PAIN SCALES - GENERAL
PAINLEVEL_OUTOF10: 8
PAINLEVEL_OUTOF10: 0 - NO PAIN
PAINLEVEL_OUTOF10: 0 - NO PAIN

## 2024-12-25 ASSESSMENT — PAIN DESCRIPTION - ORIENTATION: ORIENTATION: LEFT

## 2024-12-25 ASSESSMENT — PAIN DESCRIPTION - LOCATION: LOCATION: HIP

## 2024-12-25 NOTE — PROGRESS NOTES
"Ermelinda Benoit is a 89 y.o. female on day 7 of admission presenting with Fall, initial encounter.    Subjective   The patient's family is present today and states that she is more somnolent than she has been the prior 2 days.       Objective     Physical Exam  The patient appears somnolent, but answers question and is oriented to person, place and situation.    Inspection of the left lower extremity reveals that the dressing is clean, dry and intact.  Minimal postoperative swelling is noted.  She is able to plantarflex and dorsiflex the ankle.  Sensation to light touch is grossly intact.  Last Recorded Vitals  Blood pressure 155/68, pulse 55, temperature 36.4 °C (97.5 °F), resp. rate 18, height 1.575 m (5' 2\"), weight 54.4 kg (120 lb), SpO2 99%.  Intake/Output last 3 Shifts:  I/O last 3 completed shifts:  In: 491.3 (9 mL/kg) [I.V.:491.3 (9 mL/kg)]  Out: 800 (14.7 mL/kg) [Urine:800 (0.4 mL/kg/hr)]  Weight: 54.4 kg     Relevant Results               Results for orders placed or performed during the hospital encounter of 12/18/24 (from the past 24 hours)   CBC   Result Value Ref Range    WBC 6.4 4.4 - 11.3 x10*3/uL    nRBC 0.0 0.0 - 0.0 /100 WBCs    RBC 3.24 (L) 4.00 - 5.20 x10*6/uL    Hemoglobin 10.3 (L) 12.0 - 16.0 g/dL    Hematocrit 31.0 (L) 36.0 - 46.0 %    MCV 96 80 - 100 fL    MCH 31.8 26.0 - 34.0 pg    MCHC 33.2 32.0 - 36.0 g/dL    RDW 12.3 11.5 - 14.5 %    Platelets 173 150 - 450 x10*3/uL   Comprehensive Metabolic Panel   Result Value Ref Range    Glucose 68 (L) 74 - 99 mg/dL    Sodium 139 136 - 145 mmol/L    Potassium 4.5 3.5 - 5.3 mmol/L    Chloride 103 98 - 107 mmol/L    Bicarbonate 28 21 - 32 mmol/L    Anion Gap 13 10 - 20 mmol/L    Urea Nitrogen 42 (H) 6 - 23 mg/dL    Creatinine 1.16 (H) 0.50 - 1.05 mg/dL    eGFR 45 (L) >60 mL/min/1.73m*2    Calcium 8.7 8.6 - 10.3 mg/dL    Albumin 3.1 (L) 3.4 - 5.0 g/dL    Alkaline Phosphatase 58 33 - 136 U/L    Total Protein 5.9 (L) 6.4 - 8.2 g/dL    AST 17 9 - 39 U/L "    Bilirubin, Total 1.1 0.0 - 1.2 mg/dL    ALT 3 (L) 7 - 45 U/L                   Assessment/Plan   Assessment & Plan  Fall, initial encounter    Closed nondisplaced intertrochanteric fracture of left femur    The patient is postoperative day 4 from her left hip ORIF.  From an orthopedic standpoint she is stable.  Her hemoglobin is 10.3 today.  The white count is normal and she is afebrile.  We will wait for medical evaluation to determine if any further testing needs to be performed             Myles Leggett MD

## 2024-12-25 NOTE — PROGRESS NOTES
12/25/24 1127   Discharge Planning   Living Arrangements Other (Comment)   Support Systems Children   Type of Residence Assisted living   Care Facility Name [facility] in [place]     Patient with discharge orders and plan to return to assisted living, [facility], no home care or after care arranged.  I called, spoke with patient's daughter Alexus who is concerned about patient's mental and physical capacity postoperatively.  Alexus verbalized concerns regarding patient returning to [facility], verbalized preference for acute rehab: referral sent.  Now, it is an unsafe plan for patient to return to assisted living as patient is requiring extensive assistance with any/all ADLs.  Discharge planning ongoing while patient has been medically cleared to transition to next level of care.

## 2024-12-25 NOTE — CARE PLAN
The patient's goals for the shift include rest.    The clinical goals for the shift include safety

## 2024-12-25 NOTE — CARE PLAN
The patient's goals for the shift include rest.    The clinical goals for the shift include safety, comfort, and pain control.    Pain  Add All  Takes deep breaths with improved pain control throughout the shift  Add  Today at 0112 - Progressing by Juan J Juarez, ABY  Add  Turns in bed with improved pain control throughout the shift  Add  Today at 0112 - Progressing by Juan J Juarez, RN  Add  Walks with improved pain control throughout the shift  Add  Today at 0112 - Progressing by Juan J Juarez, RN  Add  Performs ADL's with improved pain control throughout shift  Add  Today at 0112 - Progressing by Juan J Juarez, RN  Add  Participates in PT with improved pain control throughout the shift  Add  Today at 0112 - Progressing by Juan J Juarez, RN  Add  Free from opioid side effects throughout the shift  Add  Today at 0112 - Progressing by Juan J Juarez, ABY  Add  Free from acute confusion related to pain meds throughout the shift  Add  Today at 0112 - Progressing by Juan J Juarez, ABY  Add  Skin  Add All  Participates in plan/prevention/treatment measures  Add  Today at 0112 - Progressing by Juan J Juarez RN  Add  Flowsheets  Taken today at 0112  Participates in plan/prevention/treatment measures  Elevate heels  Prevent/manage excess moisture  Add  Today at 0112 - Progressing by Juan J Juarez, RN  Add  Flowsheets  Taken today at 0112  Prevent/manage excess moisture  Monitor for/manage infection if present  Prevent/minimize sheer/friction injuries  Add  Today at 0112 - Progressing by Juan J Juarez, RN  Add  Flowsheets  Taken today at 0112  Prevent/minimize sheer/friction injuries  HOB 30 degrees or less  Promote/optimize nutrition  Add  Today at 0112 - Progressing by Juan J Juarez, RN  Add  Flowsheets  Taken today at 0112  Promote/optimize nutrition  Monitor/record intake including meals;Assist with feeding  Promote skin healing  Add  Today at 0112 - Progressing by Juan J Juarez  RN  Add  Flowsheets  Taken today at 0112  Promote skin healing  Assess skin/pad under line(s)/device(s)

## 2024-12-25 NOTE — PROGRESS NOTES
"Physical Therapy                 Therapy Communication Note    Patient Name: Ermelinda Benoit  MRN: [id number]  Department: [facility]  Room: [facility]-A  Today's Date: 12/25/2024     Discipline: Physical Therapy    PT Missed Visit: Yes     Missed Visit Reason: Patient refused    Missed Time: Attempt    Comment:  pt declined participation in tx at this time despite encouragement and education regarding benefits of functional mobility - pt states she is tired, cold, and doesn't feel well.  when given encouragement to participate pt states \"just leave me alone\".    "

## 2024-12-26 ENCOUNTER — APPOINTMENT (OUTPATIENT)
Dept: RADIOLOGY | Facility: HOSPITAL | Age: 89
End: 2024-12-26
Payer: MEDICARE

## 2024-12-26 LAB
ANION GAP SERPL CALC-SCNC: 14 MMOL/L (ref 10–20)
BUN SERPL-MCNC: 46 MG/DL (ref 6–23)
CALCIUM SERPL-MCNC: 8.7 MG/DL (ref 8.6–10.3)
CHLORIDE SERPL-SCNC: 101 MMOL/L (ref 98–107)
CO2 SERPL-SCNC: 27 MMOL/L (ref 21–32)
CREAT SERPL-MCNC: 1.08 MG/DL (ref 0.5–1.05)
EGFRCR SERPLBLD CKD-EPI 2021: 49 ML/MIN/1.73M*2
ERYTHROCYTE [DISTWIDTH] IN BLOOD BY AUTOMATED COUNT: 12.3 % (ref 11.5–14.5)
GLUCOSE SERPL-MCNC: 76 MG/DL (ref 74–99)
HCT VFR BLD AUTO: 33.4 % (ref 36–46)
HGB BLD-MCNC: 11 G/DL (ref 12–16)
MCH RBC QN AUTO: 31.5 PG (ref 26–34)
MCHC RBC AUTO-ENTMCNC: 32.9 G/DL (ref 32–36)
MCV RBC AUTO: 96 FL (ref 80–100)
NRBC BLD-RTO: 0 /100 WBCS (ref 0–0)
PLATELET # BLD AUTO: 202 X10*3/UL (ref 150–450)
POTASSIUM SERPL-SCNC: 4.5 MMOL/L (ref 3.5–5.3)
RBC # BLD AUTO: 3.49 X10*6/UL (ref 4–5.2)
SODIUM SERPL-SCNC: 137 MMOL/L (ref 136–145)
WBC # BLD AUTO: 6.8 X10*3/UL (ref 4.4–11.3)

## 2024-12-26 PROCEDURE — 2500000001 HC RX 250 WO HCPCS SELF ADMINISTERED DRUGS (ALT 637 FOR MEDICARE OP): Performed by: REGISTERED NURSE

## 2024-12-26 PROCEDURE — 97535 SELF CARE MNGMENT TRAINING: CPT | Mod: GP,CQ

## 2024-12-26 PROCEDURE — 36415 COLL VENOUS BLD VENIPUNCTURE: CPT | Performed by: INTERNAL MEDICINE

## 2024-12-26 PROCEDURE — 2500000001 HC RX 250 WO HCPCS SELF ADMINISTERED DRUGS (ALT 637 FOR MEDICARE OP): Performed by: INTERNAL MEDICINE

## 2024-12-26 PROCEDURE — 2500000004 HC RX 250 GENERAL PHARMACY W/ HCPCS (ALT 636 FOR OP/ED): Performed by: REGISTERED NURSE

## 2024-12-26 PROCEDURE — 71045 X-RAY EXAM CHEST 1 VIEW: CPT

## 2024-12-26 PROCEDURE — 2500000002 HC RX 250 W HCPCS SELF ADMINISTERED DRUGS (ALT 637 FOR MEDICARE OP, ALT 636 FOR OP/ED): Performed by: REGISTERED NURSE

## 2024-12-26 PROCEDURE — 1100000001 HC PRIVATE ROOM DAILY

## 2024-12-26 PROCEDURE — 80048 BASIC METABOLIC PNL TOTAL CA: CPT | Performed by: INTERNAL MEDICINE

## 2024-12-26 PROCEDURE — 71045 X-RAY EXAM CHEST 1 VIEW: CPT | Performed by: RADIOLOGY

## 2024-12-26 PROCEDURE — 2500000005 HC RX 250 GENERAL PHARMACY W/O HCPCS: Performed by: INTERNAL MEDICINE

## 2024-12-26 PROCEDURE — 97116 GAIT TRAINING THERAPY: CPT | Mod: GP,CQ

## 2024-12-26 PROCEDURE — 85027 COMPLETE CBC AUTOMATED: CPT | Performed by: INTERNAL MEDICINE

## 2024-12-26 RX ORDER — IPRATROPIUM BROMIDE AND ALBUTEROL SULFATE 2.5; .5 MG/3ML; MG/3ML
3 SOLUTION RESPIRATORY (INHALATION) 3 TIMES DAILY
Status: DISCONTINUED | OUTPATIENT
Start: 2024-12-26 | End: 2024-12-26

## 2024-12-26 RX ADMIN — ESCITALOPRAM OXALATE 10 MG: 10 TABLET ORAL at 08:19

## 2024-12-26 RX ADMIN — Medication 2000 UNITS: at 08:19

## 2024-12-26 RX ADMIN — APIXABAN 2.5 MG: 2.5 TABLET, FILM COATED ORAL at 08:19

## 2024-12-26 RX ADMIN — LIDOCAINE 4% 2 PATCH: 40 PATCH TOPICAL at 08:19

## 2024-12-26 RX ADMIN — TRAMADOL HYDROCHLORIDE 25 MG: 50 TABLET, COATED ORAL at 12:53

## 2024-12-26 RX ADMIN — LOSARTAN POTASSIUM 100 MG: 50 TABLET, FILM COATED ORAL at 08:19

## 2024-12-26 RX ADMIN — ACETAMINOPHEN 975 MG: 325 TABLET, FILM COATED ORAL at 08:19

## 2024-12-26 RX ADMIN — MIRTAZAPINE 7.5 MG: 15 TABLET, FILM COATED ORAL at 21:37

## 2024-12-26 RX ADMIN — ACETAMINOPHEN 975 MG: 325 TABLET, FILM COATED ORAL at 16:48

## 2024-12-26 RX ADMIN — AMLODIPINE BESYLATE 5 MG: 5 TABLET ORAL at 08:19

## 2024-12-26 RX ADMIN — POTASSIUM CHLORIDE 10 MEQ: 750 TABLET, EXTENDED RELEASE ORAL at 21:37

## 2024-12-26 RX ADMIN — POTASSIUM CHLORIDE 10 MEQ: 750 TABLET, EXTENDED RELEASE ORAL at 08:19

## 2024-12-26 RX ADMIN — APIXABAN 2.5 MG: 2.5 TABLET, FILM COATED ORAL at 21:37

## 2024-12-26 RX ADMIN — HYDROXYZINE HYDROCHLORIDE 25 MG: 25 TABLET ORAL at 08:23

## 2024-12-26 RX ADMIN — SENNOSIDES AND DOCUSATE SODIUM 2 TABLET: 50; 8.6 TABLET ORAL at 21:38

## 2024-12-26 RX ADMIN — ACETAMINOPHEN 975 MG: 325 TABLET, FILM COATED ORAL at 01:13

## 2024-12-26 RX ADMIN — POLYETHYLENE GLYCOL 3350 17 G: 17 POWDER, FOR SOLUTION ORAL at 08:18

## 2024-12-26 RX ADMIN — TORSEMIDE 30 MG: 20 TABLET ORAL at 08:19

## 2024-12-26 RX ADMIN — ROSUVASTATIN CALCIUM 5 MG: 10 TABLET, FILM COATED ORAL at 21:37

## 2024-12-26 ASSESSMENT — COGNITIVE AND FUNCTIONAL STATUS - GENERAL
WALKING IN HOSPITAL ROOM: A LOT
MOVING FROM LYING ON BACK TO SITTING ON SIDE OF FLAT BED WITH BEDRAILS: A LOT
MOBILITY SCORE: 11
CLIMB 3 TO 5 STEPS WITH RAILING: TOTAL
MOVING TO AND FROM BED TO CHAIR: A LOT
TURNING FROM BACK TO SIDE WHILE IN FLAT BAD: A LOT
STANDING UP FROM CHAIR USING ARMS: A LOT

## 2024-12-26 ASSESSMENT — PAIN SCALES - GENERAL
PAINLEVEL_OUTOF10: 2
PAINLEVEL_OUTOF10: 0 - NO PAIN

## 2024-12-26 ASSESSMENT — PAIN DESCRIPTION - LOCATION: LOCATION: HIP

## 2024-12-26 ASSESSMENT — PAIN - FUNCTIONAL ASSESSMENT
PAIN_FUNCTIONAL_ASSESSMENT: 0-10
PAIN_FUNCTIONAL_ASSESSMENT: 0-10

## 2024-12-26 ASSESSMENT — PAIN DESCRIPTION - ORIENTATION: ORIENTATION: LEFT

## 2024-12-26 NOTE — PROGRESS NOTES
Acute rehab has declined patient. Ronel from acute rehab down to speak with daughter regarding this. Patient plan is to return to [facility] with there intensive therapy program and daughter will hire a care giver for patient. Patient getting further work up still and is not yet medically ready for discharge.

## 2024-12-26 NOTE — CARE PLAN
The patient's goals for the shift include rest.    The clinical goals for the shift include pain management    Problem: Pain  Goal: Takes deep breaths with improved pain control throughout the shift  Outcome: Progressing  Goal: Turns in bed with improved pain control throughout the shift  Outcome: Progressing  Goal: Walks with improved pain control throughout the shift  Outcome: Progressing  Goal: Performs ADL's with improved pain control throughout shift  Outcome: Progressing  Goal: Participates in PT with improved pain control throughout the shift  Outcome: Progressing  Goal: Free from opioid side effects throughout the shift  Outcome: Progressing  Goal: Free from acute confusion related to pain meds throughout the shift  Outcome: Progressing     Problem: Skin  Goal: Participates in plan/prevention/treatment measures  Outcome: Progressing  Flowsheets (Taken 12/25/2024 2236)  Participates in plan/prevention/treatment measures: Elevate heels  Goal: Prevent/manage excess moisture  Outcome: Progressing  Flowsheets (Taken 12/25/2024 2236)  Prevent/manage excess moisture:   Monitor for/manage infection if present   Cleanse incontinence/protect with barrier cream   Moisturize dry skin  Goal: Prevent/minimize sheer/friction injuries  Outcome: Progressing  Flowsheets (Taken 12/25/2024 2236)  Prevent/minimize sheer/friction injuries:   Use pull sheet   HOB 30 degrees or less  Goal: Promote/optimize nutrition  Outcome: Progressing  Flowsheets (Taken 12/25/2024 2236)  Promote/optimize nutrition:   Monitor/record intake including meals   Offer water/supplements/favorite foods  Goal: Promote skin healing  Outcome: Progressing  Flowsheets (Taken 12/25/2024 2236)  Promote skin healing:   Assess skin/pad under line(s)/device(s)   Turn/reposition every 2 hours/use positioning/transfer devices

## 2024-12-26 NOTE — PROGRESS NOTES
"Ermelinda Benoit is a 89 y.o. female on day 8 of admission presenting with Fall, initial encounter.    Subjective   Resting comfortably denies any hip pain.       Objective     Physical Exam  Examination of the left hip reveals dressing is dry thigh and calf are soft nontender.  Neurovascular motor and sensory is intact.  Last Recorded Vitals  Blood pressure 126/59, pulse 74, temperature 36.2 °C (97.2 °F), temperature source Temporal, resp. rate 18, height 1.575 m (5' 2\"), weight 54.4 kg (120 lb), SpO2 94%.  Intake/Output last 3 Shifts:  I/O last 3 completed shifts:  In: - (0 mL/kg)   Out: 1500 (27.6 mL/kg) [Urine:1500 (0.8 mL/kg/hr)]  Weight: 54.4 kg     Relevant Results      Scheduled medications  acetaminophen, 975 mg, oral, q8h  amLODIPine, 5 mg, oral, Daily  apixaban, 2.5 mg, oral, q12h VICTOR HUGO  cholecalciferol, 2,000 Units, oral, Daily  escitalopram, 10 mg, oral, Daily  lidocaine, 2 patch, transdermal, Daily  losartan, 100 mg, oral, Daily  mirtazapine, 7.5 mg, oral, Nightly  polyethylene glycol, 17 g, oral, Daily  potassium chloride CR, 10 mEq, oral, BID  rosuvastatin, 5 mg, oral, Nightly  sennosides-docusate sodium, 2 tablet, oral, Nightly  torsemide, 30 mg, oral, Daily      Continuous medications  oxygen, 2 L/min      PRN medications  PRN medications: bisacodyl, bisacodyl, cyclobenzaprine, guaiFENesin, hydrOXYzine HCL, ipratropium-albuteroL, magnesium hydroxide, melatonin, metoclopramide **OR** metoclopramide, ondansetron, ondansetron **OR** ondansetron, oxyCODONE, polyethylene glycol, traMADol  Results for orders placed or performed during the hospital encounter of 12/18/24 (from the past 24 hours)   Basic metabolic panel   Result Value Ref Range    Glucose 76 74 - 99 mg/dL    Sodium 137 136 - 145 mmol/L    Potassium 4.5 3.5 - 5.3 mmol/L    Chloride 101 98 - 107 mmol/L    Bicarbonate 27 21 - 32 mmol/L    Anion Gap 14 10 - 20 mmol/L    Urea Nitrogen 46 (H) 6 - 23 mg/dL    Creatinine 1.08 (H) 0.50 - 1.05 mg/dL "    eGFR 49 (L) >60 mL/min/1.73m*2    Calcium 8.7 8.6 - 10.3 mg/dL   CBC   Result Value Ref Range    WBC 6.8 4.4 - 11.3 x10*3/uL    nRBC 0.0 0.0 - 0.0 /100 WBCs    RBC 3.49 (L) 4.00 - 5.20 x10*6/uL    Hemoglobin 11.0 (L) 12.0 - 16.0 g/dL    Hematocrit 33.4 (L) 36.0 - 46.0 %    MCV 96 80 - 100 fL    MCH 31.5 26.0 - 34.0 pg    MCHC 32.9 32.0 - 36.0 g/dL    RDW 12.3 11.5 - 14.5 %    Platelets 202 150 - 450 x10*3/uL                            Assessment/Plan   Assessment & Plan  Fall, initial encounter    Closed nondisplaced intertrochanteric fracture of left femur    Postoperative day #5 left hip TFN nail.  Physical therapy for walker ambulation weightbearing to tolerance.   for discharge planning.       I spent 10 minutes in the professional and overall care of this patient.      Fernando Cruz DO

## 2024-12-26 NOTE — CARE PLAN
The patient's goals for the shift include rest.    The clinical goals for the shift include pain management

## 2024-12-26 NOTE — PROGRESS NOTES
Physical Therapy    Physical Therapy Treatment    Patient Name: Ermelinda Benoit  MRN: [id number]  Department: [facility]  Room: [address] [place] [address]  Today's Date: 12/26/2024  Time Calculation  Start Time: 0145  Stop Time: 0215  Time Calculation (min): 30 min         Assessment/Plan         PT Plan  Treatment/Interventions: Bed mobility, Transfer training, Gait training, Balance training, Neuromuscular re-education, Strengthening, Endurance training, Therapeutic exercise, Therapeutic activity  PT Plan: Ongoing PT  PT Frequency: 5 times per week  PT Discharge Recommendations: Moderate intensity level of continued care  PT - OK to Discharge: Yes              Subjective                Objective   Pain: no complaints                          Treatments:  Ambulation/Gait Training  Ambulation/Gait Training Performed:  (ambulates 5 ft. with standard device, MOD X2; room pulse OX was low 80's. ambulates to bedside chair X2 MOD A X2 X3 ft. Fatigues quickly and requires prolonged standing breaks.)  Transfers  Transfer:  (Sit > < stands MOD X2, X2 trials)    Outcome Measures:  [facility] Basic Mobility  Turning from your back to your side while in a flat bed without using bedrails: A lot  Moving from lying on your back to sitting on the side of a flat bed without using bedrails: A lot  Moving to and from bed to chair (including a wheelchair): A lot  Standing up from a chair using your arms (e.g. wheelchair or bedside chair): A lot  To walk in hospital room: A lot  Climbing 3-5 steps with railing: Total  Basic Mobility - Total Score: 11    Education Documentation  Precautions, taught by Diane Cardoso PTA at 12/26/2024  2:54 PM.  Learner: Patient  Readiness: Acceptance  Method: Demonstration  Response: Demonstrated Understanding    ADL Training, taught by Diane Cardoso PTA at 12/26/2024  2:54 PM.  Learner: Patient  Readiness: Acceptance  Method: Demonstration  Response: Demonstrated Understanding    Mobility Training, taught by Diane LANG  LINDSEY Cardoso at 12/26/2024  2:54 PM.  Learner: Patient  Readiness: Acceptance  Method: Demonstration  Response: Demonstrated Understanding    Education Comments  No comments found.               Encounter Problems       Encounter Problems (Active)       PT Problem       STG - Pt will transition supine <> sitting with minAx2 (Progressing)       Start:  12/22/24    Expected End:  01/05/25            STG - Pt will transfer STS with modAx2 (Progressing)       Start:  12/22/24    Expected End:  01/05/25            STG - Pt will amb >/=15' using WW with minAx2 (Progressing)       Start:  12/22/24    Expected End:  01/05/25            STG - Pt will maintain F+ standing balance to decrease risk of falls (Progressing)       Start:  12/22/24    Expected End:  01/05/25

## 2024-12-27 VITALS
HEART RATE: 68 BPM | OXYGEN SATURATION: 97 % | DIASTOLIC BLOOD PRESSURE: 71 MMHG | BODY MASS INDEX: 22.07 KG/M2 | RESPIRATION RATE: 18 BRPM | TEMPERATURE: 97.9 F | SYSTOLIC BLOOD PRESSURE: 131 MMHG | HEIGHT: 62 IN | WEIGHT: 119.93 LBS

## 2024-12-27 PROCEDURE — 2500000001 HC RX 250 WO HCPCS SELF ADMINISTERED DRUGS (ALT 637 FOR MEDICARE OP): Performed by: REGISTERED NURSE

## 2024-12-27 PROCEDURE — 2500000005 HC RX 250 GENERAL PHARMACY W/O HCPCS: Performed by: INTERNAL MEDICINE

## 2024-12-27 PROCEDURE — 94640 AIRWAY INHALATION TREATMENT: CPT

## 2024-12-27 PROCEDURE — 2500000001 HC RX 250 WO HCPCS SELF ADMINISTERED DRUGS (ALT 637 FOR MEDICARE OP): Performed by: INTERNAL MEDICINE

## 2024-12-27 PROCEDURE — 2500000002 HC RX 250 W HCPCS SELF ADMINISTERED DRUGS (ALT 637 FOR MEDICARE OP, ALT 636 FOR OP/ED): Performed by: INTERNAL MEDICINE

## 2024-12-27 PROCEDURE — 2500000004 HC RX 250 GENERAL PHARMACY W/ HCPCS (ALT 636 FOR OP/ED): Performed by: REGISTERED NURSE

## 2024-12-27 PROCEDURE — 2500000002 HC RX 250 W HCPCS SELF ADMINISTERED DRUGS (ALT 637 FOR MEDICARE OP, ALT 636 FOR OP/ED): Performed by: REGISTERED NURSE

## 2024-12-27 PROCEDURE — 99238 HOSP IP/OBS DSCHRG MGMT 30/<: CPT | Performed by: INTERNAL MEDICINE

## 2024-12-27 RX ORDER — IPRATROPIUM BROMIDE AND ALBUTEROL SULFATE 2.5; .5 MG/3ML; MG/3ML
3 SOLUTION RESPIRATORY (INHALATION)
Qty: 270 ML | Refills: 0 | Status: SHIPPED | OUTPATIENT
Start: 2024-12-27 | End: 2025-01-26

## 2024-12-27 RX ORDER — IPRATROPIUM BROMIDE AND ALBUTEROL SULFATE 2.5; .5 MG/3ML; MG/3ML
3 SOLUTION RESPIRATORY (INHALATION)
Status: DISCONTINUED | OUTPATIENT
Start: 2024-12-27 | End: 2024-12-27 | Stop reason: HOSPADM

## 2024-12-27 RX ADMIN — IPRATROPIUM BROMIDE AND ALBUTEROL SULFATE 3 ML: .5; 3 SOLUTION RESPIRATORY (INHALATION) at 10:06

## 2024-12-27 RX ADMIN — Medication 2000 UNITS: at 08:15

## 2024-12-27 RX ADMIN — POTASSIUM CHLORIDE 10 MEQ: 750 TABLET, EXTENDED RELEASE ORAL at 08:16

## 2024-12-27 RX ADMIN — TORSEMIDE 30 MG: 20 TABLET ORAL at 08:15

## 2024-12-27 RX ADMIN — APIXABAN 2.5 MG: 2.5 TABLET, FILM COATED ORAL at 08:15

## 2024-12-27 RX ADMIN — AMLODIPINE BESYLATE 5 MG: 5 TABLET ORAL at 08:16

## 2024-12-27 RX ADMIN — LOSARTAN POTASSIUM 100 MG: 50 TABLET, FILM COATED ORAL at 08:15

## 2024-12-27 RX ADMIN — LIDOCAINE 4% 2 PATCH: 40 PATCH TOPICAL at 08:14

## 2024-12-27 RX ADMIN — IPRATROPIUM BROMIDE AND ALBUTEROL SULFATE 3 ML: .5; 3 SOLUTION RESPIRATORY (INHALATION) at 15:06

## 2024-12-27 RX ADMIN — ESCITALOPRAM OXALATE 10 MG: 10 TABLET ORAL at 08:15

## 2024-12-27 RX ADMIN — POLYETHYLENE GLYCOL 3350 17 G: 17 POWDER, FOR SOLUTION ORAL at 08:14

## 2024-12-27 RX ADMIN — ACETAMINOPHEN 975 MG: 325 TABLET, FILM COATED ORAL at 08:15

## 2024-12-27 ASSESSMENT — COGNITIVE AND FUNCTIONAL STATUS - GENERAL
TOILETING: A LITTLE
MOVING FROM LYING ON BACK TO SITTING ON SIDE OF FLAT BED WITH BEDRAILS: A LITTLE
WALKING IN HOSPITAL ROOM: A LOT
HELP NEEDED FOR BATHING: A LITTLE
DAILY ACTIVITIY SCORE: 19
DRESSING REGULAR LOWER BODY CLOTHING: A LOT
DRESSING REGULAR UPPER BODY CLOTHING: A LITTLE
MOBILITY SCORE: 14
TURNING FROM BACK TO SIDE WHILE IN FLAT BAD: A LITTLE
STANDING UP FROM CHAIR USING ARMS: A LOT
MOVING TO AND FROM BED TO CHAIR: A LITTLE
CLIMB 3 TO 5 STEPS WITH RAILING: TOTAL

## 2024-12-27 ASSESSMENT — PAIN SCALES - GENERAL: PAINLEVEL_OUTOF10: 0 - NO PAIN

## 2024-12-27 NOTE — CONSULTS
"Nutrition Initial Assessment:   Nutrition Assessment    Reason for Assessment: Length of stay (Pt is hospital day #9; MST=0)    Patient is a 89 y.o. female presenting with s/p fall with subsequent L hip fx; s/p L hip ORIF 12/21/24    PmHx: CHF, HTN, CVA, COPD, TAVR, afib, artery embolism, R breast cancer s/p lumpectomy    Nutrition History:  Energy Intake:  (no meal intakes recorded at this time)  Food and Nutrient History: Pt was sleeping soundly at time of attempted visit today.  Secure message sent to Joshua RAHMAN regarding meal intakes as there are none recorded on flow sheet.  Pt is POD#6 L hip ORIF due to hip fx after fall. Per review of EMR, pt is noted to be A&Ox1-2.  Vitamin/Herbal Supplement Use: vitamin D3  Food Allergies/Intolerances:  None  GI Symptoms:  LUBNA   Oral Problems:  LUBNA        Anthropometrics:  Height: 157.5 cm (5' 2.01\")   Weight: 54.4 kg (119 lb 14.9 oz)   BMI (Calculated): 21.93  IBW/kg (Dietitian Calculated): 50 kg  Percent of IBW: 109 %       Weight History:     Weight Change %:  Weight History / % Weight Change: 5/21 54.4kg, 10/2023 53.5kg  Significant Weight Loss: No    Nutrition Focused Physical Exam Findings:    Subcutaneous Fat Loss:   Orbital Fat Pads: Defer (pt sleeping)  Muscle Wasting:     Edema:  Edema: none  Physical Findings:  Skin: Positive (L hip incision)    Nutrition Significant Labs:  CBC Trend:   Results from last 7 days   Lab Units 12/26/24  0617 12/25/24  0551 12/24/24  0552 12/23/24  0611   WBC AUTO x10*3/uL 6.8 6.4 7.4 8.6   RBC AUTO x10*6/uL 3.49* 3.24* 3.45* 3.79*   HEMOGLOBIN g/dL 11.0* 10.3* 10.6* 11.7*   HEMATOCRIT % 33.4* 31.0* 32.2* 36.5   MCV fL 96 96 93 96   PLATELETS AUTO x10*3/uL 202 173 174 162    , BMP Trend:   Results from last 7 days   Lab Units 12/26/24  0617 12/25/24  0551 12/24/24  0552 12/23/24  1252   GLUCOSE mg/dL 76 68* 102* 132*   CALCIUM mg/dL 8.7 8.7 8.6 8.6   SODIUM mmol/L 137 139 135* 129*   POTASSIUM mmol/L 4.5 4.5 4.3 4.3   CO2 mmol/L 27 " 28 26 24   CHLORIDE mmol/L 101 103 100 96*   BUN mg/dL 46* 42* 45* 48*   CREATININE mg/dL 1.08* 1.16* 1.06* 1.24*        Nutrition Specific Medications:  Reviewed     I/O:   Last BM Date: 12/23/24;      Dietary Orders (From admission, onward)       Start     Ordered    12/27/24 1258  Oral nutritional supplements  Until discontinued        Question Answer Comment   Deliver with Lunch    Deliver with Dinner    Select supplement: Magic Cup        12/27/24 1257    12/23/24 1336  Adult diet Regular; 1800 mL fluid  Diet effective now        Question Answer Comment   Diet type Regular    Dietary fluid restriction / 24h: 1800 mL fluid        12/23/24 1335    12/19/24 0050  May Participate in Room Service With Assistance  ( ROOM SERVICE MAY PARTICIPATE WITH ASSISTANCE)  Once        Question:  .  Answer:  Yes    12/19/24 0049                     Estimated Needs:      Method for Estimating Needs: 1360-1630kcals (25-30kcals/kg ABW)     Method for Estimating Needs: 60-70g (1.1-1.3g/kg ABW)     Method for Estimating Needs: 1 mL/kcal or as per MD        Nutrition Diagnosis   Malnutrition Diagnosis  Patient has Malnutrition Diagnosis:  (unable to determine at this time)    Nutrition Diagnosis  Patient has Nutrition Diagnosis: Yes  Diagnosis Status (1): New  Nutrition Diagnosis 1: Increased nutrient needs  Related to (1): physiological causes increasing nutrient needs  As Evidenced by (1): wound healing needs to L hip       Nutrition Interventions/Recommendations         Nutrition Prescription:  Individualized Nutrition Prescription Provided for : Regular diet as ordered with 1800ml fluid restriction.  Will order Magic cup twice daily        Nutrition Interventions:   Interventions: Meals and snacks, Medical food supplement  Meals and Snacks: General healthful diet, Fluid-modified diet  Goal: consume >50->75% of meals  Medical Food Supplement: Commercial food  Goal: consume >75% of magic cup twice daily (for an additional 290  kcals, 9 gm protein each)    Collaboration and Referral of Nutrition Care:  (Joshua RAHMAN)    Nutrition Education:   N/A     Nutrition Monitoring and Evaluation   Food/Nutrient Related History Monitoring  Monitoring and Evaluation Plan: Energy intake, Fluid intake, Amount of food  Energy Intake: Estimated energy intake  Criteria: Meal/ONS intake to meet >75% of estimated needs  Fluid Intake: Estimated fluid intake  Criteria: fluid intake to meet >75% of estimated need  Amount of Food: Estimated amout of food, Medical food intake  Criteria: Pt to consume >75% of meals/ONS    Body Composition/Growth/Weight History  Monitoring and Evaluation Plan: Weight  Weight: Measured weight  Criteria: maintain stable weight    Biochemical Data, Medical Tests and Procedures  Monitoring and Evaluation Plan: Electrolyte/renal panel  Criteria: labs WNL    Nutrition Focused Physical Findings  Monitoring and Evaluation Plan: Skin, Digestive System  Criteria: BM at least every 2-3 days (last once documented 12/23)  Criteria: promote healing through adequate nutrition         Time Spent (min): 45 minutes

## 2024-12-27 NOTE — PROGRESS NOTES
"ORTHOPAEDIC SURGERY POST-OP PROGRESS NOTE       SERVICE DATE: 12/27/2024   SERVICE TIME:  12:27 PM    Subjective   Seen and examined at bedside, resting comfortably.  Left hip pain well controlled.  No overnight issues.  Denies numbness or tingling LLE.        Objective   VITAL SIGNS: /71 (BP Location: Right arm, Patient Position: Lying)   Pulse 68   Temp 36.6 °C (97.9 °F) (Temporal)   Resp 18   Ht 1.575 m (5' 2\")   Wt 54.4 kg (120 lb)   SpO2 97%   BMI 21.95 kg/m²   INTAKE AND OUTPUT:   No intake or output data in the 24 hours ending 12/27/24 1227         PHYSICAL EXAMINATION:  Left Lower Extremity:    Dorsalis pedis pulses palpable.    Posterior tibial pulses palpable.    Dorsi flexion 5/5.    Plantar flexion 5/5.    Extensor hallucis extension: 5/5.    Sensory intact to light touch L1-S1.    Dressing intact.    Surgical site no drainage.       Problem Review and Assessment: Patient monitored, no new events overnight.    Patient Active Problem List   Diagnosis    Leg edema    Weakness    Closed displaced comminuted fracture of shaft of right humerus with routine healing    COPD (chronic obstructive pulmonary disease) (Multi)    Hypertension, essential    S/P TAVR (transcatheter aortic valve replacement)    Dyslipidemia    Vertigo    Chronic diastolic congestive heart failure    Dyspnea    Epistaxis    History of cerebrovascular accident    History of malignant neoplasm of breast    Influenza with pneumonia    Absence of right breast    Melena    Longstanding persistent atrial fibrillation (Multi)    Proximal humerus fracture    History of aortic valve replacement    Vasovagal syncope    Cerebrovascular accident (CVA) due to embolism of left posterior cerebral artery (Multi)    Aortic prosthetic valve regurgitation    Ambulatory dysfunction    Presence of artificial hip joint, right    Permanent atrial fibrillation (Multi)    Anemia    Atrioventricular block, complete (Multi)    Dizziness    On " continuous oral anticoagulation    Peripheral vascular disease (CMS-HCC)    Pain of right hip    Urinary tract infection    Abnormal CXR    Nontoxic multinodular goiter    Nonrheumatic aortic (valve) stenosis    Non-pressure chronic ulcer of other part of right foot limited to breakdown of skin    Muscle weakness (generalized)    Pain in right upper arm    Insomnia, unspecified    Cerebral infarction, unspecified    Cardiomegaly    Constipation, unspecified    Diverticulosis of large intestine without perforation or abscess without bleeding    Fatigue due to depression    Current mild episode of major depressive disorder (CMS-HCC)    Abnormal bilirubin test    Weight gain    Alteration in skin integrity    Other fatigue    Hordeolum externum of left upper eyelid    Fall, initial encounter    Closed nondisplaced intertrochanteric fracture of left femur       LABS:  No results found for this or any previous visit (from the past 24 hours).    POST OPERATIVE COMPLICATIONS:  Complicated by: None         ASSESSMENT:   S/P Procedure(s) (LRB):  LEFT HIP OPEN REDUCTION INTERNAL FIXATION WITH C-ARM (Left) on 12/21/2024 with Dr. Leggett    POST-OP PLAN:   Physical Therapy evaluation - WBAT LLE  DVT prophylaxis   Dressing - Mepilex x 7 days  Pain control - Multimodal  Antibiotics - Completed   Case Management for discharge planning - Plan for discharge to SNF vs. Home with [facility]  Follow up with Dr. Leggett outpatient in 2 weeks for repeat radiographs and wound check/staple removal     Willi Pennington, DO  Orthopaedic Surgery  Sports Medicine & Shoulder  [facility]   [phone]

## 2024-12-27 NOTE — CARE PLAN
The patient's goals for the shift include rest.    The clinical goals for the shift include Safety

## 2024-12-27 NOTE — CARE PLAN
The patient's goals for the shift include rest.    The clinical goals for the shift include safety, comfort, and pain control.    Pain  Add All  Takes deep breaths with improved pain control throughout the shift  Add  Today at 0143 - Progressing by Juan J Juarez, ABY  Add  Turns in bed with improved pain control throughout the shift  Add  Today at 0143 - Progressing by Juan J Juarez, RN  Add  Walks with improved pain control throughout the shift  Add  Today at 0143 - Progressing by Juan J Juarez, ABY  Add  Performs ADL's with improved pain control throughout shift  Add  Today at 0143 - Progressing by Juan J Juarez RN  Add  Participates in PT with improved pain control throughout the shift  Add  Today at 0143 - Progressing by Juan J Juarez RN  Add  Free from opioid side effects throughout the shift  Add  Today at 0143 - Progressing by Juan J Juarez, ABY  Add  Free from acute confusion related to pain meds throughout the shift  Add  Today at 0143 - Progressing by Juan J Juarez, ABY  Add  Skin  Add All  Participates in plan/prevention/treatment measures  Add  Today at 0143 - Progressing by Juan J Juarez RN  Add  Flowsheets  Taken today at 0143  Participates in plan/prevention/treatment measures  Elevate heels  Prevent/manage excess moisture  Add  Today at 0143 - Progressing by Juan J Juarez RN  Add  Flowsheets  Taken today at 0143  Prevent/manage excess moisture  Monitor for/manage infection if present  Prevent/minimize sheer/friction injuries  Add  Today at 0143 - Progressing by Juan J Juarez RN  Add  Flowsheets  Taken today at 0143  Prevent/minimize sheer/friction injuries  HOB 30 degrees or less  Promote/optimize nutrition  Add  Today at 0143 - Progressing by Juan J Juarez, RN  Add  Flowsheets  Taken today at 0143  Promote/optimize nutrition  Offer water/supplements/favorite foods  Promote skin healing  Add  Today at 0143 - Progressing by Juan J Juarez RN  Add  Flowsheets  Taken  today at 0143  Promote skin healing  Assess skin/pad under line(s)/device(s)

## 2024-12-27 NOTE — PROGRESS NOTES
Ermelinda Benoit is a 89 y.o. female on day 9 of admission presenting with Fall, initial encounter.  Record reviewed.  Patient is planned to return to [facility] with therapy provided by facility.  [facility]  This TCC placed call to Dr. Washington to confirm discharge readiness.  Dr. Washington plans to round by noon to see patient, ok to set up transport for afternoon.  Nursing updated.  Care Transitions will continue to follow.    11:00 am addendum  This TCC received call from LUBNA Rosen at [facility], regarding patient readiness.  Updates provided.  Jessika indicated patient is planned to return with new home O2.  TCC appreciative of information.  Brie with DME phoned with referral for home O2.  Facility will need hard copy script for narcotics sent with patient.  Discharge information, including script, to be faxed to [facility] at [phone].  Nursing updated.  Care Transitions will continue to follow.

## 2024-12-27 NOTE — PROGRESS NOTES
Ermelinda Benoit is a 89 y.o. female on day 9 of admission presenting with Fall, initial encounter.      Subjective      Patient is awake and alert, sitting up in chair, looks tachypneic, denies any shortness of breath.  The family is at the bedside and they have some concern about her breathing and the fact the patient has been on oxygen.  Patient does have history of CHF with preserved ejection fraction and is on torsemide at home.  According to the daughter patient was on oxygen for a while a few years ago following the procedure but gradually was tapered off of it.    Objective   Patient is awake and alert, oriented x 1-2    Looks slightly tachypneic    Lungs bilateral clear auscultation    Heart irregularly irregular    Abdomen soft and nontender    Extremities no edema  Last Recorded Vitals  /60 (BP Location: Left arm, Patient Position: Lying)   Pulse 66   Temp 36 °C (96.8 °F) (Temporal)   Resp 18   Wt 54.4 kg (120 lb)   SpO2 99%   Intake/Output last 3 Shifts:  No intake or output data in the 24 hours ending 12/27/24 0046    Admission Weight  Weight: 54.4 kg (120 lb) (12/18/24 2205)    Daily Weight  12/18/24 : 54.4 kg (120 lb)    Image Results  XR chest 1 view  Narrative: Interpreted By:  Geovanna Pyel,   STUDY:  XR CHEST 1 VIEW;  12/26/2024 2:32 pm      INDICATION:  Signs/Symptoms:dyspnea.      COMPARISON:  12/18/2024      ACCESSION NUMBER(S):  [id number]      ORDERING CLINICIAN:  CAILIN JENKINS      FINDINGS:  CARDIOMEDIASTINAL SILHOUETTE:  Cardiomediastinal silhouette is normal in size and configuration.  Status post transcatheter aortic valve repair.      LUNGS:  There is a persistent opacity at the left lung base which may  represent chronic volume loss and scarring. The lungs are otherwise  clear.      ABDOMEN:  No remarkable upper abdominal findings.          BONES:  No acute osseous changes.      There are surgical clips in the right breast and right axilla      Impression: Chronic  opacity left lung base.      MACRO:  None      Signed by: Geovanna Pyle 12/26/2024 3:58 PM  Dictation workstation:   [id number]      Physical Exam    Relevant Results               Assessment/Plan      #1 is status post left hip ORIF    2. hypoxemia postsurgery, would obtain a portable chest x-ray, patient was encouraged to use incentive spirometry and she was placed on aerosol treatments.  Patient will be reevaluated tomorrow.    The plan is for the patient to go to a skilled nursing floor versus back to her assisted living if the facility can provide her with PT onsite.            Assessment & Plan  Fall, initial encounter    Closed nondisplaced intertrochanteric fracture of left femur                  Rashad Washington MD

## 2024-12-28 NOTE — PROGRESS NOTES
Ermelinda Benoit is a 89 y.o. female on day 9 of admission presenting with Fall, initial encounter.      Subjective   Patient was seen and examined.  Family is at the bedside.  Patient is a status post left hip ORIF.  She is currently off of oxygen and will be checked her pulse oximetry on room air it was around 93%.  Patient has been having hypoxemia when ambulating.  The plan is to discharge her to a skilled nursing floor on oxygen for the time being.  A chest x-ray that was done last night did not show any pneumonia or CHF.  Patient does have significant kyphosis that may contribute to the hypoxemia.       Objective   Patient is awake and alert oriented x 2    Lungs bilateral clear auscultation    Heart regular S1-S2    Abdomen soft and nontender    Extremities no edema    Thoracic his spine moderate to severe kyphosis  Last Recorded Vitals  /71 (BP Location: Right arm, Patient Position: Lying)   Pulse 68   Temp 36.6 °C (97.9 °F) (Temporal)   Resp 18   Wt 54.4 kg (119 lb 14.9 oz)   SpO2 97%   Intake/Output last 3 Shifts:  No intake or output data in the 24 hours ending 12/27/24 2344    Admission Weight  Weight: 54.4 kg (120 lb) (12/18/24 2205)    Daily Weight  12/27/24 : 54.4 kg (119 lb 14.9 oz)    Image Results  XR chest 1 view  Narrative: Interpreted By:  Geovanna Pyle,   STUDY:  XR CHEST 1 VIEW;  12/26/2024 2:32 pm      INDICATION:  Signs/Symptoms:dyspnea.      COMPARISON:  12/18/2024      ACCESSION NUMBER(S):  [id number]      ORDERING CLINICIAN:  CAILIN JENKINS      FINDINGS:  CARDIOMEDIASTINAL SILHOUETTE:  Cardiomediastinal silhouette is normal in size and configuration.  Status post transcatheter aortic valve repair.      LUNGS:  There is a persistent opacity at the left lung base which may  represent chronic volume loss and scarring. The lungs are otherwise  clear.      ABDOMEN:  No remarkable upper abdominal findings.          BONES:  No acute osseous changes.      There are surgical clips  in the right breast and right axilla      Impression: Chronic opacity left lung base.      MACRO:  None      Signed by: Geovanna Pyle 12/26/2024 3:58 PM  Dictation workstation:   [id number]      Physical Exam    Relevant Results               Assessment/Plan      #1 status post ORIF of the left hip    2.  Hypoxemia, questionable restrictive lung disease    3.  History of systolic congestive heart failure, patient was treated with IV Lasix a few days ago prior to surgery and she has been on oral torsemide since, chest x-ray was negative for CHF    The plan is for the patient to go to her assisted living and apparently they are able to provide her with the care and physical therapy that is needed.  Family is aware of this plan and the fact that the patient even though she qualifies to go to the SNF she is going to go back to her assisted living.            Assessment & Plan  Fall, initial encounter    Closed nondisplaced intertrochanteric fracture of left femur                  Rashad Washington MD

## 2024-12-30 NOTE — DISCHARGE SUMMARY
Discharge Diagnosis  Fall, initial encounter    Issues Requiring Follow-Up  Hip fx    Discharge Meds     Medication List      START taking these medications     hydrOXYzine HCL 25 mg tablet; Commonly known as: Atarax; Take 1 tablet   (25 mg) by mouth every 6 hours if needed for anxiety.   mirtazapine 7.5 mg tablet; Commonly known as: Remeron; Take 1 tablet   (7.5 mg) by mouth once daily at bedtime.   traMADoL 25 mg tablet; Take 25 mg by mouth every 6 hours if needed for   severe pain (7 - 10).     CHANGE how you take these medications     escitalopram 10 mg tablet; Commonly known as: Lexapro; Take 1 tablet (10   mg) by mouth once daily. Do not fill before December 24, 2024.; What   changed: medication strength, how much to take   * ipratropium-albuteroL 0.5-2.5 mg/3 mL nebulizer solution; Commonly   known as: Duo-Neb; What changed: Another medication with the same name was   added. Make sure you understand how and when to take each.   * ipratropium-albuteroL 0.5-2.5 mg/3 mL nebulizer solution; Commonly   known as: Duo-Neb; Take 3 mL by nebulization 3 times a day.; What changed:   You were already taking a medication with the same name, and this   prescription was added. Make sure you understand how and when to take   each.  * This list has 2 medication(s) that are the same as other medications   prescribed for you. Read the directions carefully, and ask your doctor or   other care provider to review them with you.     CONTINUE taking these medications     acetaminophen 325 mg tablet; Commonly known as: Tylenol   apixaban 2.5 mg tablet; Commonly known as: Eliquis; Take 1 tablet (2.5   mg) by mouth 2 times a day.   cholecalciferol 50 mcg (2,000 unit) capsule; Commonly known as: Vitamin   D-3   guaiFENesin 600 mg 12 hr tablet; Commonly known as: Mucinex   losartan 100 mg tablet; Commonly known as: Cozaar; Take 1 tablet (100   mg) by mouth once daily.   melatonin 3 mg tablet   polyethylene glycol 17 gram packet;  Commonly known as: Glycolax, Miralax   potassium chloride CR 10 mEq ER tablet; Commonly known as: Klor-Con   rosuvastatin 5 mg tablet; Commonly known as: Crestor   sennosides-docusate sodium 8.6-50 mg tablet; Commonly known as:   Martha-Colace   torsemide 20 mg tablet; Commonly known as: Demadex; Take 1.5 tablets (30   mg) by mouth once daily.     STOP taking these medications     magnesium hydroxide 400 mg/5 mL suspension; Commonly known as: Milk of   Magnesia       Test Results Pending At Discharge  Pending Labs       No current pending labs.            Hospital Course   #1 status post ORIF of the left hip     2.  Hypoxemia, questionable restrictive lung disease     3.  History of systolic congestive heart failure, patient was treated with IV Lasix a few days ago prior to surgery and she has been on oral torsemide since, chest x-ray was negative for CHF     The plan is for the patient to go to her assisted living and apparently they are able to provide her with the care and physical therapy that is needed.  Family is aware of this plan and the fact that the patient even though she qualifies to go to the SNF she is going to go back to her assisted living.    Pertinent Physical Exam At Time of Discharge  Physical Exam  Constitutional:       General: She is awake. She is not in acute distress.     Appearance: Normal appearance. She is underweight. She is not toxic-appearing.      Interventions: Nasal cannula in place.   HENT:      Head: Normocephalic and atraumatic.      Nose: Nose normal. No rhinorrhea.      Mouth/Throat:      Mouth: Mucous membranes are moist.   Eyes:      General:         Right eye: No discharge.         Left eye: No discharge.      Conjunctiva/sclera: Conjunctivae normal.      Pupils: Pupils are equal, round, and reactive to light.   Cardiovascular:      Rate and Rhythm: Normal rate and regular rhythm.      Pulses: Normal pulses.           Dorsalis pedis pulses are 2+ on the right side and 2+ on  the left side.   Pulmonary:      Effort: Pulmonary effort is normal. Tachypnea present. No respiratory distress.      Breath sounds: Examination of the right-lower field reveals decreased breath sounds. Examination of the left-lower field reveals decreased breath sounds. Decreased breath sounds present. No wheezing.   Abdominal:      General: Bowel sounds are normal. There is no distension.      Palpations: Abdomen is soft.      Tenderness: There is no abdominal tenderness. There is no guarding.   Musculoskeletal:         General: Normal range of motion.      Cervical back: Normal range of motion and neck supple.      Right lower le+ Edema present.      Left lower leg: Edema present.      Comments: Left leg shortened and externally rotated.  Pain in left hip with palpation.  Pain with movement.  Able to wiggle toes bilateral.  Pulses palpable, dorsalis pedis bilateral feet.   Skin:     General: Skin is warm and dry.   Neurological:      General: No focal deficit present.      Mental Status: She is alert and oriented to person, place, and time. Mental status is at baseline.      Motor: No weakness.   Psychiatric:         Mood and Affect: Mood normal.         Behavior: Behavior normal.     Outpatient Follow-Up  Future Appointments   Date Time Provider Department Center   2/3/2025  2:45 PM Ej Hoffman MD PhD [id number] [place]         Mckay Arita DO

## 2024-12-30 NOTE — PROGRESS NOTES
PROGRESS NOTE    Subjective   Chief complaint: Ermelinda Benoit is a 89 y.o. female who is an assisted living facility patient being seen and evaluated for general medical care and monthly follow-up    HPI:  Patient seen and evaluated for general medical care and monthly follow-up.  Patient at baseline mentation, pleasant, cooperative.  Offers no complaints at time.  Patient offers no complaints of SOB.  BP within goal.  Mood stable - engages with other community members.  Appetite fair.  Sleeping well. No other concerns per nursing          Objective   Vital signs:  152/60, 98.2, 54, 18, 93%    Physical Exam  Constitutional:       Appearance: Normal appearance.   HENT:      Head: Normocephalic.      Nose: Nose normal.      Mouth/Throat:      Mouth: Mucous membranes are moist.   Eyes:      Extraocular Movements: Extraocular movements intact.      Comments: Stye left upper lid improved   Cardiovascular:      Rate and Rhythm: Normal rate. Rhythm irregular.      Pulses: Normal pulses.      Heart sounds: Murmur heard.   Pulmonary:      Effort: Pulmonary effort is normal.      Breath sounds: Normal breath sounds.   Abdominal:      General: Bowel sounds are normal.      Palpations: Abdomen is soft.   Musculoskeletal:         General: Normal range of motion.      Cervical back: Normal range of motion and neck supple.      Right lower leg: Edema present.      Left lower leg: Edema present.   Skin:     General: Skin is warm and dry.      Capillary Refill: Capillary refill takes less than 2 seconds.   Neurological:      Mental Status: She is alert. Mental status is at baseline.   Psychiatric:         Mood and Affect: Mood normal.         Behavior: Behavior normal.         Assessment/Plan   Problem List Items Addressed This Visit       Chronic diastolic congestive heart failure      continue torsemide   control BP- continue Cozaar   monitor weights   LOKI diet   Monitor BP   Routine BMP         Longstanding persistent atrial  fibrillation (Multi)      Stable    rate controlled   no SOB, palpitations, fatigue, pain   Eliquis   bleeding precautions   monitor         Ambulatory dysfunction - Primary      ambulate with walker assist due to weakness   Patient to use wheelchair for distance   Reinforced calling for assistance when needed         Constipation, unspecified     No complaints  Continue MiraLAX, Senokot         Current mild episode of major depressive disorder (CMS-HCC)      Improved mood   continues engaging with other residents at meals   Lexapro   monitor behaviors   monitor for changes in appetite or sleeping pattern         Hordeolum externum of left upper eyelid     Improved  Warm compresses  No pain, visual changes, eye irritation  monitor          Medications, treatments, and labs reviewed  Continue medications and treatments as listed in EMR    Ashlee Nicholas, APRN-CNP

## 2024-12-30 NOTE — ASSESSMENT & PLAN NOTE
ambulate with walker assist due to weakness   Patient to use wheelchair for distance   Reinforced calling for assistance when needed

## 2025-01-11 DIAGNOSIS — I10 HYPERTENSION, ESSENTIAL: ICD-10-CM

## 2025-01-13 PROBLEM — I63.9 CEREBRAL INFARCTION, UNSPECIFIED: Status: RESOLVED | Noted: 2023-07-13 | Resolved: 2025-01-13

## 2025-01-13 PROBLEM — Z86.73 HISTORY OF CEREBROVASCULAR ACCIDENT: Status: RESOLVED | Noted: 2023-10-25 | Resolved: 2025-01-13

## 2025-01-13 PROBLEM — K59.00 CONSTIPATION, UNSPECIFIED: Status: RESOLVED | Noted: 2023-07-13 | Resolved: 2025-01-13

## 2025-01-13 PROBLEM — N39.0 URINARY TRACT INFECTION: Status: RESOLVED | Noted: 2023-04-29 | Resolved: 2025-01-13

## 2025-01-13 RX ORDER — LOSARTAN POTASSIUM 100 MG/1
100 TABLET ORAL DAILY
Qty: 90 TABLET | Refills: 3 | Status: SHIPPED | OUTPATIENT
Start: 2025-01-13

## 2025-02-03 ENCOUNTER — HOSPITAL ENCOUNTER (OUTPATIENT)
Dept: RADIOLOGY | Facility: CLINIC | Age: OVER 89
Discharge: HOME | End: 2025-02-03
Payer: MEDICARE

## 2025-02-03 ENCOUNTER — APPOINTMENT (OUTPATIENT)
Dept: CARDIOLOGY | Facility: CLINIC | Age: OVER 89
End: 2025-02-03
Payer: MEDICARE

## 2025-02-03 VITALS — DIASTOLIC BLOOD PRESSURE: 70 MMHG | OXYGEN SATURATION: 93 % | SYSTOLIC BLOOD PRESSURE: 126 MMHG | HEART RATE: 65 BPM

## 2025-02-03 DIAGNOSIS — R06.02 SHORTNESS OF BREATH: ICD-10-CM

## 2025-02-03 DIAGNOSIS — J44.9 CHRONIC OBSTRUCTIVE PULMONARY DISEASE, UNSPECIFIED COPD TYPE (MULTI): ICD-10-CM

## 2025-02-03 DIAGNOSIS — R42 DIZZINESS: ICD-10-CM

## 2025-02-03 DIAGNOSIS — Z95.2 S/P TAVR (TRANSCATHETER AORTIC VALVE REPLACEMENT): ICD-10-CM

## 2025-02-03 DIAGNOSIS — Z09 HOSPITAL DISCHARGE FOLLOW-UP: ICD-10-CM

## 2025-02-03 DIAGNOSIS — I48.11 LONGSTANDING PERSISTENT ATRIAL FIBRILLATION (MULTI): ICD-10-CM

## 2025-02-03 DIAGNOSIS — I50.32 CHRONIC DIASTOLIC CONGESTIVE HEART FAILURE: ICD-10-CM

## 2025-02-03 DIAGNOSIS — I48.21 PERMANENT ATRIAL FIBRILLATION (MULTI): ICD-10-CM

## 2025-02-03 DIAGNOSIS — R06.02 SHORTNESS OF BREATH: Primary | ICD-10-CM

## 2025-02-03 DIAGNOSIS — Z95.2 HISTORY OF AORTIC VALVE REPLACEMENT: ICD-10-CM

## 2025-02-03 DIAGNOSIS — I51.7 CARDIOMEGALY: ICD-10-CM

## 2025-02-03 DIAGNOSIS — T82.897D AORTIC PROSTHETIC VALVE REGURGITATION, SUBSEQUENT ENCOUNTER: ICD-10-CM

## 2025-02-03 PROCEDURE — 71046 X-RAY EXAM CHEST 2 VIEWS: CPT

## 2025-02-03 PROCEDURE — 71046 X-RAY EXAM CHEST 2 VIEWS: CPT | Performed by: RADIOLOGY

## 2025-02-03 RX ORDER — TRAMADOL HYDROCHLORIDE 50 MG/1
50 TABLET ORAL AS NEEDED
COMMUNITY
Start: 2025-01-09

## 2025-02-03 NOTE — PROGRESS NOTES
Chief Complaint:   Hypertension, Heart Failure, Atrial Fibrillation, Post-op TAVR, Post-op Valve Replacement, and Peripheral Vascular Disease (6 month follow up)     History Of Present Illness:    Ermelinda Benoit is a 89 y.o. female returns to clinic for follow-up appointment.  Patient was recently in the hospital after sustaining a fall resulting in hip fracture requiring surgery.  She has been recovering okay.  While in the hospital she required diuresis with Lasix.  She required oxygen at discharge has been slowly weaned off.  Over the weekend she had a pretty good weekend but however today she states she is felt the worst she started felt.  She felt lack of energy.  She had a little bit more heavy breathing.  Mild cough.  Blood pressure is 126/78 heart rate of 65 satting 93% on room air.  Of note per daughter patient is using Atarax as needed for anxiety.     Last Recorded Vitals:  Vitals:    02/03/25 1453   BP: 126/70   BP Location: Left arm   Patient Position: Sitting   BP Cuff Size: Adult   Pulse: 65   SpO2: 93%       Review of Systems  ROS      Allergies:  Codeine, Iodinated contrast media, Macrolide antibiotics, Moxifloxacin, Nitrofurantoin, Penicillins, Adhesive, Propoxyphene, Tetracycline, Erythromycin, and Sulfa (sulfonamide antibiotics)    Outpatient Medications:  Current Outpatient Medications   Medication Instructions    acetaminophen (TYLENOL) 650 mg, 2 times daily    apixaban (ELIQUIS) 2.5 mg, oral, 2 times daily    cholecalciferol (VITAMIN D-3) 2,000 Units, Daily    escitalopram (LEXAPRO) 10 mg, oral, Daily    hydrOXYzine HCL (ATARAX) 25 mg, oral, Every 6 hours PRN    ipratropium-albuteroL (Duo-Neb) 0.5-2.5 mg/3 mL nebulizer solution 3 mL, nebulization, 3 times daily RT    losartan (COZAAR) 100 mg, oral, Daily    melatonin 3 mg, Nightly PRN    mirtazapine (REMERON) 7.5 mg, oral, Nightly    polyethylene glycol (GLYCOLAX, MIRALAX) 17 g, Daily PRN    potassium chloride CR 10 mEq ER tablet 10 mEq, 2  "times daily    rosuvastatin (Crestor) 5 mg tablet 1 tablet, Nightly    sennosides-docusate sodium (Martha-Colace) 8.6-50 mg tablet 2 tablets, Nightly    torsemide (DEMADEX) 30 mg, oral, Daily    traMADol (ULTRAM) 50 mg, As needed    traMADoL 25 mg, oral, Every 6 hours PRN       Physical Exam:  General: No acute distress,  A&O x3, in a wheelchair, daughters present  Skin: Warm and dry  Neck: JVD is not elevated  ENT: Moist mucous membranes no lesions appreciated  Pulmonary: Mild rhonchi noted bilaterally  Cards: Regular rate rhythm, no murmurs gallops or rubs appreciated normal S1-S2  Abdomen: Soft nontender nondistended  Extremities: No edema or cyanosis  Psych: Appropriate mood and affect        Last Labs:  CBC -  Lab Results   Component Value Date    WBC 6.8 12/26/2024    HGB 11.0 (L) 12/26/2024    HCT 33.4 (L) 12/26/2024    MCV 96 12/26/2024     12/26/2024       CMP -  Lab Results   Component Value Date    CALCIUM 8.7 12/26/2024    PHOS 3.7 03/01/2018    PROT 5.9 (L) 12/25/2024    ALBUMIN 3.1 (L) 12/25/2024    AST 17 12/25/2024    ALT 3 (L) 12/25/2024    ALKPHOS 58 12/25/2024    BILITOT 1.1 12/25/2024       LIPID PANEL -   Lab Results   Component Value Date    CHOL 176 04/11/2019    HDL 57.6 04/11/2019    CHHDL 3.1 04/11/2019       RENAL FUNCTION PANEL -   Lab Results   Component Value Date    GLUCOSE 76 12/26/2024     12/26/2024    K 4.5 12/26/2024     12/26/2024    CO2 27 12/26/2024    ANIONGAP 14 12/26/2024    BUN 46 (H) 12/26/2024    CREATININE 1.08 (H) 12/26/2024    CALCIUM 8.7 12/26/2024    PHOS 3.7 03/01/2018    ALBUMIN 3.1 (L) 12/25/2024        Lab Results   Component Value Date     (H) 12/18/2024       Last Cardiology Tests:  ECG:  No results found for this or any previous visit (from the past 4464 hours).     Echo:  No results found for this or any previous visit from the past 1095 days.       Ejection Fractions:  No results found for: \"EF\"    Cath:    Assessment and Plan    1. " Severe aortic stenosis status post TAVR with a Medtronic CoreValve evolute 26 mm. Postprocedure echocardiogram shows mild perivalvular aortic regurgitation.   -Remains on Eliquis for A-fib which will continue.  No need for aspirin.  -Endocarditis prophylaxis where appropriate.     2. Heart failure preserved ejection fraction: EF of 60% with moderate elevated RVSP.  Prior hospitalization for falls resulting in hip fracture and decompensated heart failure.  She was diuresed with IV Lasix.    -Continue torsemide 30 mg once a day  -Order chest x-ray to rule out pneumonia due to physical exam and patient feels worse overall    3. Permanent atrial fibrillation: Slow ventricular rate. [id number] score of 7. Anticoagulated with Eliquis. Previously on Coumadin. Not on beta-blockers due to bradycardia.       4. Hypertension: Reasonably controlled today with current medical therapy.     5. Dyslipidemia: On Crestor 5 mg.      6. Dizziness: Chronic appears to be multifactorial secondary to prior stroke, postural dizziness and possibly some chronic intracranial vascular insufficiency. Her symptoms did not improve after correction of her aortic valve stenosis. We will allow for permissive hypertension as mentioned above.      Chest x-ray  Follow-up in 6 months    (This note was generated with voice recognition software and may contain errors including spelling, grammar, syntax and missed recognition of what was dictated, of which may not have been fully corrected)     jE Hoffman MD PhD

## 2025-08-19 PROBLEM — C50.919 MALIGNANT TUMOR OF BREAST: Status: ACTIVE | Noted: 2023-07-13

## 2025-08-19 PROBLEM — R53.83 FATIGUE: Status: RESOLVED | Noted: 2024-08-10 | Resolved: 2025-08-19

## 2025-08-19 PROBLEM — Z95.2 HISTORY OF AORTIC VALVE REPLACEMENT: Status: RESOLVED | Noted: 2023-04-22 | Resolved: 2025-08-19

## 2025-08-20 ENCOUNTER — APPOINTMENT (OUTPATIENT)
Dept: CARDIOLOGY | Facility: CLINIC | Age: OVER 89
End: 2025-08-20
Payer: MEDICARE

## 2025-08-20 VITALS
WEIGHT: 118 LBS | OXYGEN SATURATION: 95 % | SYSTOLIC BLOOD PRESSURE: 150 MMHG | BODY MASS INDEX: 21.58 KG/M2 | DIASTOLIC BLOOD PRESSURE: 70 MMHG | HEART RATE: 56 BPM

## 2025-08-20 DIAGNOSIS — I48.21 PERMANENT ATRIAL FIBRILLATION (MULTI): ICD-10-CM

## 2025-08-20 DIAGNOSIS — E78.5 DYSLIPIDEMIA: ICD-10-CM

## 2025-08-20 DIAGNOSIS — I50.32 CHRONIC DIASTOLIC CONGESTIVE HEART FAILURE: ICD-10-CM

## 2025-08-20 DIAGNOSIS — I10 HYPERTENSION, ESSENTIAL: ICD-10-CM

## 2025-08-20 DIAGNOSIS — I48.11 LONGSTANDING PERSISTENT ATRIAL FIBRILLATION (MULTI): ICD-10-CM

## 2025-08-20 DIAGNOSIS — T82.897D AORTIC PROSTHETIC VALVE REGURGITATION, SUBSEQUENT ENCOUNTER: ICD-10-CM

## 2025-08-20 DIAGNOSIS — Z95.2 S/P TAVR (TRANSCATHETER AORTIC VALVE REPLACEMENT): Primary | ICD-10-CM

## 2025-08-20 PROCEDURE — 3077F SYST BP >= 140 MM HG: CPT | Performed by: STUDENT IN AN ORGANIZED HEALTH CARE EDUCATION/TRAINING PROGRAM

## 2025-08-20 PROCEDURE — 1036F TOBACCO NON-USER: CPT | Performed by: STUDENT IN AN ORGANIZED HEALTH CARE EDUCATION/TRAINING PROGRAM

## 2025-08-20 PROCEDURE — 1159F MED LIST DOCD IN RCRD: CPT | Performed by: STUDENT IN AN ORGANIZED HEALTH CARE EDUCATION/TRAINING PROGRAM

## 2025-08-20 PROCEDURE — 99214 OFFICE O/P EST MOD 30 MIN: CPT | Performed by: STUDENT IN AN ORGANIZED HEALTH CARE EDUCATION/TRAINING PROGRAM

## 2025-08-20 PROCEDURE — 3078F DIAST BP <80 MM HG: CPT | Performed by: STUDENT IN AN ORGANIZED HEALTH CARE EDUCATION/TRAINING PROGRAM

## 2025-08-20 RX ORDER — DEXTROMETHORPHAN POLISTIREX 30 MG/5 ML
1 SUSPENSION, EXTENDED RELEASE 12 HR ORAL ONCE
COMMUNITY

## 2026-02-25 ENCOUNTER — APPOINTMENT (OUTPATIENT)
Dept: CARDIOLOGY | Facility: CLINIC | Age: OVER 89
End: 2026-02-25
Payer: MEDICARE

## (undated) DEVICE — Device

## (undated) DEVICE — DRAPE COVER, C ARM, FLOUROSCAN IMAGING SYS

## (undated) DEVICE — WOUND SYSTEM, DEBRIDEMENT & CLEANING, O.R DUOPAK

## (undated) DEVICE — SLEEVE, VASO PRESS, CALF GARMENT, MEDIUM, GREEN

## (undated) DEVICE — BANDAGE, COFLEX, 6 X 5 YDS, FOAM TAN, STERILE, LF

## (undated) DEVICE — GUIDEWIRE, 3.2 X 400

## (undated) DEVICE — APPLICATOR, CHLORAPREP, W/ORANGE TINT, 26ML

## (undated) DEVICE — DRILL BIT, 4.2MM 3-FLUTED QC 330MM 100MM CALB

## (undated) DEVICE — DRAPE, SHEET, THREE QUARTER, FAN FOLD, 57 X 77 IN

## (undated) DEVICE — DRESSING, MEPILEX BORDER, POST-OP AG, 4 X 10 IN